# Patient Record
Sex: FEMALE | Race: WHITE | NOT HISPANIC OR LATINO | Employment: UNEMPLOYED | URBAN - METROPOLITAN AREA
[De-identification: names, ages, dates, MRNs, and addresses within clinical notes are randomized per-mention and may not be internally consistent; named-entity substitution may affect disease eponyms.]

---

## 2017-04-16 ENCOUNTER — APPOINTMENT (EMERGENCY)
Dept: CT IMAGING | Facility: HOSPITAL | Age: 32
End: 2017-04-16
Payer: COMMERCIAL

## 2017-04-16 ENCOUNTER — HOSPITAL ENCOUNTER (EMERGENCY)
Facility: HOSPITAL | Age: 32
Discharge: HOME | End: 2017-04-16
Attending: EMERGENCY MEDICINE | Admitting: EMERGENCY MEDICINE
Payer: COMMERCIAL

## 2017-04-16 VITALS
DIASTOLIC BLOOD PRESSURE: 58 MMHG | WEIGHT: 210.54 LBS | SYSTOLIC BLOOD PRESSURE: 129 MMHG | OXYGEN SATURATION: 98 % | TEMPERATURE: 98.5 F | RESPIRATION RATE: 18 BRPM | HEART RATE: 78 BPM

## 2017-04-16 DIAGNOSIS — K52.9 ACUTE GASTROENTERITIS: Primary | ICD-10-CM

## 2017-04-16 LAB
ANION GAP SERPL CALCULATED.3IONS-SCNC: 11 MMOL/L (ref 4–13)
BACTERIA UR QL AUTO: ABNORMAL /HPF
BASOPHILS # BLD AUTO: 0.03 THOUSANDS/ΜL (ref 0–0.1)
BASOPHILS NFR BLD AUTO: 0 % (ref 0–1)
BILIRUB UR QL STRIP: ABNORMAL
BUN SERPL-MCNC: 8 MG/DL (ref 5–25)
CALCIUM SERPL-MCNC: 8.6 MG/DL (ref 8.3–10.1)
CHLORIDE SERPL-SCNC: 106 MMOL/L (ref 100–108)
CLARITY UR: ABNORMAL
CLARITY, POC: NORMAL
CO2 SERPL-SCNC: 25 MMOL/L (ref 21–32)
COLOR UR: ABNORMAL
COLOR, POC: NORMAL
CREAT SERPL-MCNC: 0.83 MG/DL (ref 0.6–1.3)
EOSINOPHIL # BLD AUTO: 0 THOUSAND/ΜL (ref 0–0.61)
EOSINOPHIL NFR BLD AUTO: 0 % (ref 0–6)
ERYTHROCYTE [DISTWIDTH] IN BLOOD BY AUTOMATED COUNT: 14.3 % (ref 11.6–15.1)
GFR SERPL CREATININE-BSD FRML MDRD: >60 ML/MIN/1.73SQ M
GLUCOSE SERPL-MCNC: 79 MG/DL (ref 65–140)
GLUCOSE UR STRIP-MCNC: NEGATIVE MG/DL
HCG UR QL: NEGATIVE
HCT VFR BLD AUTO: 37.9 % (ref 34.8–46.1)
HGB BLD-MCNC: 12.9 G/DL (ref 11.5–15.4)
HGB UR QL STRIP.AUTO: ABNORMAL
KETONES UR STRIP-MCNC: ABNORMAL MG/DL
LEUKOCYTE ESTERASE UR QL STRIP: ABNORMAL
LYMPHOCYTES # BLD AUTO: 2.26 THOUSANDS/ΜL (ref 0.6–4.47)
LYMPHOCYTES NFR BLD AUTO: 13 % (ref 14–44)
MCH RBC QN AUTO: 31.9 PG (ref 26.8–34.3)
MCHC RBC AUTO-ENTMCNC: 34 G/DL (ref 31.4–37.4)
MCV RBC AUTO: 94 FL (ref 82–98)
MONOCYTES # BLD AUTO: 1.06 THOUSAND/ΜL (ref 0.17–1.22)
MONOCYTES NFR BLD AUTO: 6 % (ref 4–12)
NEUTROPHILS # BLD AUTO: 13.71 THOUSANDS/ΜL (ref 1.85–7.62)
NEUTS SEG NFR BLD AUTO: 81 % (ref 43–75)
NITRITE UR QL STRIP: NEGATIVE
NON-SQ EPI CELLS URNS QL MICRO: ABNORMAL /HPF
NRBC BLD AUTO-RTO: 0 /100 WBCS
PH UR STRIP.AUTO: 5.5 [PH] (ref 4.5–8)
PLATELET # BLD AUTO: 233 THOUSANDS/UL (ref 149–390)
PMV BLD AUTO: 11.3 FL (ref 8.9–12.7)
POTASSIUM SERPL-SCNC: 3.6 MMOL/L (ref 3.5–5.3)
PROT UR STRIP-MCNC: ABNORMAL MG/DL
RBC # BLD AUTO: 4.04 MILLION/UL (ref 3.81–5.12)
RBC #/AREA URNS AUTO: ABNORMAL /HPF
SODIUM SERPL-SCNC: 142 MMOL/L (ref 136–145)
SP GR UR STRIP.AUTO: >=1.03 (ref 1–1.03)
UROBILINOGEN UR QL STRIP.AUTO: 1 E.U./DL
WBC # BLD AUTO: 17.06 THOUSAND/UL (ref 4.31–10.16)
WBC #/AREA URNS AUTO: ABNORMAL /HPF

## 2017-04-16 PROCEDURE — 36415 COLL VENOUS BLD VENIPUNCTURE: CPT | Performed by: EMERGENCY MEDICINE

## 2017-04-16 PROCEDURE — 85025 COMPLETE CBC W/AUTO DIFF WBC: CPT | Performed by: EMERGENCY MEDICINE

## 2017-04-16 PROCEDURE — 81002 URINALYSIS NONAUTO W/O SCOPE: CPT | Performed by: EMERGENCY MEDICINE

## 2017-04-16 PROCEDURE — 96374 THER/PROPH/DIAG INJ IV PUSH: CPT

## 2017-04-16 PROCEDURE — 80048 BASIC METABOLIC PNL TOTAL CA: CPT | Performed by: EMERGENCY MEDICINE

## 2017-04-16 PROCEDURE — 99284 EMERGENCY DEPT VISIT MOD MDM: CPT

## 2017-04-16 PROCEDURE — 81001 URINALYSIS AUTO W/SCOPE: CPT

## 2017-04-16 PROCEDURE — 96375 TX/PRO/DX INJ NEW DRUG ADDON: CPT

## 2017-04-16 PROCEDURE — 81025 URINE PREGNANCY TEST: CPT | Performed by: EMERGENCY MEDICINE

## 2017-04-16 PROCEDURE — 74177 CT ABD & PELVIS W/CONTRAST: CPT

## 2017-04-16 PROCEDURE — 87086 URINE CULTURE/COLONY COUNT: CPT

## 2017-04-16 PROCEDURE — 96361 HYDRATE IV INFUSION ADD-ON: CPT

## 2017-04-16 RX ORDER — ONDANSETRON 4 MG/1
4 TABLET, ORALLY DISINTEGRATING ORAL EVERY 8 HOURS PRN
Qty: 10 TABLET | Refills: 0 | Status: SHIPPED | OUTPATIENT
Start: 2017-04-16 | End: 2019-05-10 | Stop reason: ALTCHOICE

## 2017-04-16 RX ORDER — PROPRANOLOL HYDROCHLORIDE 10 MG/1
10 TABLET ORAL 3 TIMES DAILY PRN
COMMUNITY

## 2017-04-16 RX ORDER — PRAZOSIN HYDROCHLORIDE 5 MG/1
10 CAPSULE ORAL
COMMUNITY
End: 2019-05-29

## 2017-04-16 RX ORDER — ONDANSETRON 2 MG/ML
4 INJECTION INTRAMUSCULAR; INTRAVENOUS ONCE
Status: COMPLETED | OUTPATIENT
Start: 2017-04-16 | End: 2017-04-16

## 2017-04-16 RX ORDER — DICYCLOMINE HCL 20 MG
20 TABLET ORAL EVERY 6 HOURS PRN
Qty: 10 TABLET | Refills: 0 | Status: SHIPPED | OUTPATIENT
Start: 2017-04-16 | End: 2018-01-26

## 2017-04-16 RX ORDER — MORPHINE SULFATE 4 MG/ML
4 INJECTION, SOLUTION INTRAMUSCULAR; INTRAVENOUS ONCE
Status: COMPLETED | OUTPATIENT
Start: 2017-04-16 | End: 2017-04-16

## 2017-04-16 RX ADMIN — SODIUM CHLORIDE 1000 ML: 0.9 INJECTION, SOLUTION INTRAVENOUS at 17:12

## 2017-04-16 RX ADMIN — IOHEXOL 100 ML: 350 INJECTION, SOLUTION INTRAVENOUS at 17:41

## 2017-04-16 RX ADMIN — ONDANSETRON 4 MG: 2 INJECTION INTRAMUSCULAR; INTRAVENOUS at 17:30

## 2017-04-16 RX ADMIN — MORPHINE SULFATE 4 MG: 4 INJECTION, SOLUTION INTRAMUSCULAR; INTRAVENOUS at 17:53

## 2017-04-17 LAB — BACTERIA UR CULT: NORMAL

## 2017-04-24 ENCOUNTER — HOSPITAL ENCOUNTER (EMERGENCY)
Facility: HOSPITAL | Age: 32
Discharge: HOME | End: 2017-04-25
Attending: EMERGENCY MEDICINE | Admitting: EMERGENCY MEDICINE
Payer: COMMERCIAL

## 2017-04-24 ENCOUNTER — APPOINTMENT (EMERGENCY)
Dept: RADIOLOGY | Facility: HOSPITAL | Age: 32
End: 2017-04-24
Payer: COMMERCIAL

## 2017-04-24 VITALS
WEIGHT: 215 LBS | SYSTOLIC BLOOD PRESSURE: 134 MMHG | OXYGEN SATURATION: 100 % | RESPIRATION RATE: 16 BRPM | TEMPERATURE: 98.3 F | HEART RATE: 89 BPM | DIASTOLIC BLOOD PRESSURE: 79 MMHG

## 2017-04-24 DIAGNOSIS — S93.402A LEFT ANKLE SPRAIN: Primary | ICD-10-CM

## 2017-04-24 PROCEDURE — 73610 X-RAY EXAM OF ANKLE: CPT

## 2017-04-24 RX ORDER — ACETAMINOPHEN 325 MG/1
650 TABLET ORAL ONCE
Status: COMPLETED | OUTPATIENT
Start: 2017-04-24 | End: 2017-04-24

## 2017-04-24 RX ADMIN — ACETAMINOPHEN 650 MG: 325 TABLET ORAL at 23:03

## 2017-04-25 PROCEDURE — 99283 EMERGENCY DEPT VISIT LOW MDM: CPT

## 2017-11-07 ENCOUNTER — ALLSCRIPTS OFFICE VISIT (OUTPATIENT)
Dept: OTHER | Facility: OTHER | Age: 32
End: 2017-11-07

## 2017-11-07 DIAGNOSIS — S92.355A CLOSED NONDISPLACED FRACTURE OF FIFTH LEFT METATARSAL BONE: ICD-10-CM

## 2017-11-09 NOTE — PROGRESS NOTES
Assessment    1  Closed nondisplaced fracture of fifth metatarsal bone of left foot, initial encounter (983 25) (S92 355A)   2  Depression screening (V79 0) (Z13 89)    Plan  Closed nondisplaced fracture of fifth metatarsal bone of left foot, initial encounter    · Acetaminophen 500 MG Oral Tablet; TAKE 2 TABLET 3 times daily   · Ibuprofen 800 MG Oral Tablet; take 1 tablet every 8 hours prn pain   · * XR FOOT 3+ VIEW LEFT; Status:Active; Requested for:07Nov2017;    · *1 - [facility]  *  Status: Active  Requested RFE:[id number]  Care Summary provided  : Yes  Depression screening    · *VB-Depression Screening; Status:Complete;   Done: 76VJX9820 03:33PM    Discussion/Summary  Discussion Summary:   Discussed with patient that we could sign a medical release form to have x-ray sent over from [facility] however this may take a couple days  Will order new x-ray to verify findings  Also gave referral to Ortho for further evaluation and treatment  Gave refills for ibuprofen and also Tylenol be taken in between the ibuprofen  by Gali STEWARTS  Medication SE Review and Pt Understands Tx: Possible side effects of new medications were reviewed with the patient/guardian today  The treatment plan was reviewed with the patient/guardian  The patient/guardian understands and agrees with the treatment plan   Self Referrals:   Self Referrals: No      Chief Complaint  Chief Complaint Free Text Note Form: Initial visit  States injured left foot, seen at [facility] ER 4 days ago, told sprain left ankle, fx left 5th metatarsal, presents wearing ace wrap, podiatry shoe and using crutches  Advised follow up  History of Present Illness  HPI: y/o female presenting for f/u after being seen in [address] [place] [address] ED for ankle injury  Early Saturday morning was at a party dancing [employer], was crossing legs; L ankle was inverted and fell with whole body weight on top of foot   Was driven to [facility] on [address] and had xrays of ankle  Pt states she has popping of feet and they did not do xrays of feet  Has been using Ibuprofen 800mg q4 hours , using ice and warm compresses, with foot elevation  Using crutches ok  Sometimes has aching pain radiating to upper L leg   tinglig and paresthesias on medial side of L foot last night  Was told fx of 5th metatarsal       Review of Systems  Complete-Female:  Constitutional: no fever-- and-- no chills  Cardiovascular: No complaints of slow heart rate, no fast heart rate, no chest pain, no palpitations, no leg claudication, no lower extremity edema  Respiratory: No complaints of shortness of breath, no wheezing, no cough, no SOB on exertion, no orthopnea, no PND  Musculoskeletal: as noted in HPI  Integumentary: No complaints of skin rash or lesions, no itching, no skin wounds, no breast pain or lump  Neurological: numbness-- and-- tingling  ROS Reviewed:   ROS reviewed  Active Problems    1  Abnormal Papanicolaou smear of vagina with 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) (795 19,079 4)   2  Encounter for contraceptive surveillance (V25 40) (Z30 40)   3  Encounter for counseling regarding initiation of other contraceptive measure (V25 02) (Z30 09)   4  Obesity (278 00) (E66 9)   5  Sleep disorder (780 50) (G47 9)    Past Medical History  1  History of candidal vulvovaginitis (V13 29) (Z86 19)    Surgical History  1  History of Ankle Surgery   2  History of Unlisted Craniofacial And Maxillofacial Procedure    Family History  Mother    1  Family history of   Father    2  Family history of     Social History     · Current some day smoker (305 1) (F17 200)  Social History Reviewed: The social history was reviewed and updated today  The social history was reviewed and is unchanged  Current Meds   1  ClonazePAM 1 MG Oral Tablet; Therapy: 48EVR5445 to (Last Liyah Settle)  Requested for: 24FLY8666 Ordered   2  Prazosin HCl - 2 MG Oral Capsule;  Therapy: 62PEC1733 to Recorded   3  Propranolol HCl - 20 MG Oral Tablet; Therapy: 39EQH2496 to Recorded   4  Sertraline HCl - 100 MG Oral Tablet; take 2 tablet daily; Therapy: (Sekou Kang) to Recorded   5  Sprintec 28 0 25-35 MG-MCG Oral Tablet; TAKE 1 TABLET DAILY AS DIRECTED; Therapy: 82EHD4746 to (Evaluate:37Oko1770)  Requested for: 69WHS3601; Last Rx:91Yvt8904 Ordered   6  Ziprasidone HCl - 40 MG Oral Capsule; Therapy: 22XCY1381 to Recorded    Allergies  1  Codeine Sulfate TABS    Vitals  Vital Signs    Recorded: 81WQT0351 03:21PM   Temperature 98 7 F, Tympanic   Heart Rate 92   Respiration 18   Systolic 293   Diastolic 78   Height 5 ft 3 in   Weight 216 lb    BMI Calculated 38 26   BSA Calculated 2   O2 Saturation 99   LMP 22-Oct-2017   Pain Scale 7       Physical Exam   Constitutional  General appearance: No acute distress, well appearing and well nourished  Pulmonary  Respiratory effort: No increased work of breathing or signs of respiratory distress  Auscultation of lungs: Clear to auscultation  Cardiovascular  Auscultation of heart: Normal rate and rhythm, normal S1 and S2, without murmurs  Examination of extremities for edema and/or varicosities: Normal    Musculoskeletal  Gait and station: Abnormal   Gait evaluation demonstrated antalgia on the left  -- Uses crutches  Inspection/palpation of joints, bones, and muscles: Abnormal  -- Swelling noted in left foot and ankle  Tenderness to palpation along lateral aspect of left ankle  Tenderness also along lateral aspect of left foot  Results/Data  *VB-Depression Screening 79YID4034 03:33PM Amanda Bazan     Test Name Result Flag Reference   Depression Scale Result      Depression Screen - Negative For Symptoms     PHQ-2 Adult Depression Screening 69NCF9879 03:32PM User, s     Test Name Result Flag Reference   PHQ-2 Adult Depression Score 0       Over the last two weeks, how often have you been bothered by any of the following problems?  Little interest or pleasure in doing things: Not at all - 0 Feeling down, depressed, or hopeless: Not at all - 0   PHQ-2 Adult Depression Screening Negative           Signatures   Electronically signed by : AMA Liz; Nov 7 2017  4:35PM EST                       (Author)    Electronically signed by : MARGARET Rodríguez ; Nov 8 2017  7:55AM EST

## 2018-01-10 NOTE — RESULT NOTES
Verified Results  *VB-Depression Screening 76OJL3448 03:33PM Aide Saucedo     Test Name Result Flag Reference   Depression Scale Result      Depression Screen - Negative For Symptoms       Plan  Depression screening    · *VB-Depression Screening; Status:Complete;   Done: 21ORN5156 03:33PM

## 2018-01-14 VITALS
BODY MASS INDEX: 38.27 KG/M2 | HEIGHT: 63 IN | OXYGEN SATURATION: 99 % | RESPIRATION RATE: 18 BRPM | SYSTOLIC BLOOD PRESSURE: 110 MMHG | WEIGHT: 216 LBS | TEMPERATURE: 98.7 F | DIASTOLIC BLOOD PRESSURE: 78 MMHG | HEART RATE: 92 BPM

## 2018-01-16 NOTE — PROCEDURES
Assessment    1  Abnormal Papanicolaou smear of vagina with positive test for vaginal human   papillomavirus (HPV) (795 19,079 4) (R89 6,B97 7)      29yo F with hx of ASCUS pap and HPV16+  Colpo performed today  Patient tolerated well  Impression: SHREYA I-II     Plan  Abnormal Papanicolaou smear of vagina with positive test for vaginal human  papillomavirus (HPV)    · (1) TISSUE EXAM; Status:Active; Requested YZW:01HYC7774;    Perform:[facility]; AQI:16ZTQ6459;LDPQWAI; For:Abnormal Papanicolaou smear of vagina with 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); Ordered By:Sarah Hampton;  Impression? : SHREYA I-II  Sources(s) : Cervix  PMH: Encounter for routine gynecological examination with Papanicolaou smear of cervix    · Urine HCG- POC; Status:Complete;   Done: 63LOP0725 12:55PM   Performed: In Office; DFM:86FEL4266; Last Updated By:Frieda Hampton; 1/27/2016 1:30:23 PM;Ordered; Today; [address]: Encounter for routine gynecological examination with Papanicolaou smear of cervix; Ordered By:Mahi De Paz;  Unlinked    · Latuda 80 MG Oral Tablet   Dispense: 30 Days ; #:30 TABS; Refill: 0; FABIANA = N; Record; Last Updated By: Sukhdev Fonseca; 1/27/2016 12:51:56 PM     RTC for results in 2 weeks  Active Problems    1  Encounter for counseling regarding initiation of other contraceptive measure (V25 02)   (Z30 9)   2  Obesity (278 00) (E66 9)   3  Sleep disorder (780 50) (G47 9)    Current Meds    1  Sprintec 28 0 25-35 MG-MCG Oral Tablet; TAKE 1 TABLET DAILY AS DIRECTED; Therapy: 19NRH7246 to (Evaluate:76Myu1816)  Requested for: 87WYX2461; Last   Rx:03Nov2015 Ordered    2  ClonazePAM 1 MG Oral Tablet; Therapy: 86PPU2469 to (Last Roise Tony)  Requested for: 62PYR0663 Ordered   3  Latuda 80 MG Oral Tablet; Therapy: 30NMG8799 to Recorded   4  Zoloft TABS; Therapy: (Recorded:27Jan2016) to Recorded    Allergies    1   Codeine Sulfate TABS    Physical Exam    Constitutional   General appearance: Abnormal   well nourished, appears older than stated age and Poor dentition  Genitourinary   External genitalia: Normal and no lesions appreciated  Vagina: Normal, no lesions or dryness appreciated  Urethra: Normal     Urethral meatus: Normal   see colposcopy notes  Psychiatric   Orientation to person, place, and time: Normal     Mood and affect: Normal        Procedure    Procedure: colposcopy  Indication: atypical squamous cells of undetermined significance and PCR positive for high risk HPV  HPV 16 +   Risks, benefits and alternatives were discussed with the patient  We discussed possible complications, including infection, bleeding and allergic reaction  Written consent was obtained prior to the procedure  Procedure Note:   A cervical Pap smear was not performed  The squamocolumnar junction was fully visualized  After bathing the cervix in acetic acid, evaluation showed acetowhite changes at 12,4 o'clock and mosaicism at 12 o'clock, but no punctation and no atypical vessels  Vaginal Vault: no abnormalities seen  Vulva: no abnormalities seen  Cervical Biopsy: 2 biopsies taken of the cervix  the biopsies were taken at 12,4 o'clock  Hemostasis was obtained with Monsel's solution, silver nitrate and direct pressure  Patient Status: the patient tolerated the procedure well  Complications: there were no complications  Yellow: Biopsy sites  Red: Acetowhite  Clinical impression SHREYA I         Attending Note  Attending Note [facility]: Attending Note: I interviewed, took the history and examined the patient, I discussed the case with the Resident and reviewed the Resident's note, I supervised the Resident, I supervised the procedure performed by the Resident and I agree with the Resident management plan as it was presented to me  Level of Participation: I was present in clinic and examined the patient  I agree with the Resident's note        Future Appointments    Date/Time Provider Specialty Site   02/03/2016 01:00 PM [facility] [place], CRNP Schedule  [facility] [place]   02/10/2016 01:30 PM [facility], Physician Schedule  [facility]     Signatures   Electronically signed by : MARGARET Westfall ; Jan 27 2016  2:10PM EST                       (Author)    Electronically signed by :  Yessi John, ; Jan 27 2016  4:18PM EST                       (Co-author)

## 2018-01-16 NOTE — PROGRESS NOTES
Assessment    1  Encounter for contraceptive surveillance () (Z30 40)    Plan  Encounter for counseling regarding initiation of other contraceptive measure    · Sprintec 28 0 25-35 MG-MCG Oral Tablet; TAKE 1 TABLET DAILY AS DIRECTED   Rx By: Sharlene Hutchins; Dispense: 28 Days ; #:1 Tablet; Refill: 10; For: Encounter for counseling regarding initiation of other contraceptive measure; FABIANA = N; Sent To: [place] [phone]    Discussion/Summary  Discussion Summary:   Safe and effective use of OCP's reinforced  Pt verbalized understanding of all discussed  Chief Complaint  Chief Complaint Free Text Note Form: 3 month pill check  History of Present Illness  HPI: Happy taking OCP's  States regular periods, less discomfort  Wants to continue OCP's  Had a colpo has an appointment on 2/10/2016 for results      Active Problems    1  Abnormal Papanicolaou smear of vagina with positive test for vaginal human   papillomavirus (HPV) (795 19,079 4) (R89 6,B97 7)   2  Encounter for counseling regarding initiation of other contraceptive measure (V2 02)   (Z30 9)   3  Obesity (278 00) (E66 9)   4  Sleep disorder (780 50) (G47 9)    Past Medical History    1  History of candidal vulvovaginitis (V13 29) (Z86 19)    Family History    1  Family history of     2  Family history of     Social History    · Current some day smoker (305 1) (F17 210)    Current Meds   1  ClonazePAM 1 MG Oral Tablet; Therapy: 53BTY1944 to (Last Fran Malady)  Requested for: 74ORA1179 Ordered   2  Sprintec 28 0 25-35 MG-MCG Oral Tablet; TAKE 1 TABLET DAILY AS DIRECTED; Therapy: 58CMW2511 to (Evaluate:67Whu6895)  Requested for: 08KBS7916; Last   Rx:2015 Ordered   3  Zoloft TABS; Therapy: (Recorded:2016) to Recorded    Allergies    1   Codeine Sulfate TABS    Vitals  Vital Signs [Data Includes: Current Encounter]    Recorded: 11VHE4427 50:12MT   Systolic 993   Diastolic 74   Weight 887 lb    BMI Calculated 31 53   BSA Calculated 1 84     Future Appointments    Date/Time Provider Specialty Site   02/10/2016 01:30 PM [address] [address], Physician Schedule  [facility]     Signatures   Electronically signed by :  GISSELLE Bartlett; Feb  3 2016  1:35PM EST                       (Author)    Electronically signed by : Linda Martini MD; Feb 4 2016  8:13AM EST

## 2018-01-26 ENCOUNTER — HOSPITAL ENCOUNTER (EMERGENCY)
Facility: HOSPITAL | Age: 33
Discharge: HOME | End: 2018-01-26
Payer: COMMERCIAL

## 2018-01-26 VITALS
DIASTOLIC BLOOD PRESSURE: 75 MMHG | SYSTOLIC BLOOD PRESSURE: 137 MMHG | OXYGEN SATURATION: 98 % | BODY MASS INDEX: 38.16 KG/M2 | WEIGHT: 215.4 LBS | RESPIRATION RATE: 20 BRPM | TEMPERATURE: 98.3 F | HEART RATE: 86 BPM

## 2018-01-26 DIAGNOSIS — B34.9 VIRAL SYNDROME: Primary | ICD-10-CM

## 2018-01-26 LAB
FLUAV AG SPEC QL: ABNORMAL
FLUBV AG SPEC QL: DETECTED
RSV B RNA SPEC QL NAA+PROBE: ABNORMAL

## 2018-01-26 PROCEDURE — 99283 EMERGENCY DEPT VISIT LOW MDM: CPT

## 2018-01-26 PROCEDURE — 87798 DETECT AGENT NOS DNA AMP: CPT | Performed by: PHYSICIAN ASSISTANT

## 2018-01-26 RX ORDER — ALBUTEROL SULFATE 90 UG/1
2 AEROSOL, METERED RESPIRATORY (INHALATION) EVERY 6 HOURS PRN
Qty: 1 INHALER | Refills: 0 | Status: SHIPPED | OUTPATIENT
Start: 2018-01-26 | End: 2019-05-10 | Stop reason: ALTCHOICE

## 2018-01-26 RX ORDER — ZIPRASIDONE HYDROCHLORIDE 20 MG/1
20 CAPSULE ORAL DAILY
COMMUNITY
End: 2018-01-31 | Stop reason: DRUGHIGH

## 2018-01-26 NOTE — ED PROVIDER NOTES
History  Chief Complaint   Patient presents with    Flu Symptoms     fatigue, nuasea, wheezing, cough, congestion  60-year-old female presents for evaluation of flu-like symptoms x3 days  She states her symptoms started as cold-like symptoms but have worsened  He describes nasal congestion, rhinorrhea, sore throat, productive cough, wheezing, body aches  No fevers, headache, chest pain, shortness of breath, abdominal pain, vomiting, diarrhea or urinary symptoms  She is taking Tylenol cold and cough with some relief of symptoms  Patient states she is smoker but has not smoked anything today due to her symptoms  No sick contacts  She is tolerating fluids  She has no other complaints at this time  No history of asthma or lung disease  She has an appointment with her family doctor in 4 days  Prior to Admission Medications   Prescriptions Last Dose Informant Patient Reported? Taking? clonazePAM (KlonoPIN) 2 mg tablet   Yes Yes   Sig: Take 2 mg by mouth 3 (three) times a day as needed for seizures  ondansetron (ZOFRAN-ODT) 4 mg disintegrating tablet   No Yes   Sig: Take 1 tablet by mouth every 8 (eight) hours as needed for nausea or vomiting   prazosin (MINIPRESS) 5 mg capsule   Yes Yes   Sig: Take 10 mg by mouth daily at bedtime     propranolol (INDERAL) 10 mg tablet   Yes Yes   Sig: Take 10 mg by mouth 3 (three) times a day as needed     sertraline (ZOLOFT) 100 mg tablet   Yes Yes   Sig: Take 100 mg by mouth 2 (two) times a day     ziprasidone (GEODON) 20 mg capsule   Yes Yes   Sig: Take 20 mg by mouth daily      Facility-Administered Medications: None       Past Medical History:   Diagnosis Date    Anxiety     Bipolar 1 disorder ([facility] [address] )     Depression     IBS (irritable bowel syndrome)     Insomnia     Migraine     PTSD (post-traumatic stress disorder)        Past Surgical History:   Procedure Laterality Date    ANKLE HARDWARE REMOVAL      CHOLECYSTECTOMY      MOUTH SURGERY  2011 metal placed from broken jaw       History reviewed  No pertinent family history  I have reviewed and agree with the history as documented  Social History   Substance Use Topics    Smoking status: Current Every Day Smoker     Packs/day: 0 50    Smokeless tobacco: Never Used    Alcohol use Yes      Comment: ocassional        Review of Systems   Constitutional: Negative for chills and fever  HENT: Positive for congestion, postnasal drip, rhinorrhea, sinus pressure and sore throat  Negative for ear discharge, ear pain, trouble swallowing and voice change  Respiratory: Positive for cough and wheezing  Negative for chest tightness and shortness of breath  Cardiovascular: Negative for chest pain and palpitations  Gastrointestinal: Negative for abdominal pain, diarrhea, nausea and vomiting  Genitourinary: Negative for dysuria, frequency, hematuria and urgency  Musculoskeletal: Positive for myalgias  Negative for back pain  Skin: Negative for rash  Neurological: Negative for dizziness, weakness, light-headedness, numbness and headaches  Physical Exam  ED Triage Vitals [01/26/18 1049]   Temperature Pulse Respirations Blood Pressure SpO2   98 3 °F (36 8 °C) 86 20 137/75 98 %      Temp src Heart Rate Source Patient Position - Orthostatic VS BP Location FiO2 (%)   -- -- -- -- --      Pain Score       6           Orthostatic Vital Signs  Vitals:    01/26/18 1049   BP: 137/75   Pulse: 86       Physical Exam   Constitutional: She is oriented to person, place, and time  Vital signs are normal  She appears well-developed and well-nourished  Non-toxic appearance  She does not have a sickly appearance  She does not appear ill  No distress  Non-toxic appearing  She speaks in full sentences without difficulty  HENT:   Head: Normocephalic and atraumatic     Right Ear: Hearing, tympanic membrane, external ear and ear canal normal    Left Ear: Hearing, tympanic membrane, external ear and ear canal normal    Nose: Mucosal edema present  No rhinorrhea  Mouth/Throat: Uvula is midline, oropharynx is clear and moist and mucous membranes are normal  No posterior oropharyngeal erythema  Tonsils are 1+ on the right  Tonsils are 1+ on the left  No tonsillar exudate  Eyes: Conjunctivae, EOM and lids are normal  Pupils are equal, round, and reactive to light  Neck: Normal range of motion  Neck supple  Cardiovascular: Normal rate, regular rhythm and normal heart sounds  Exam reveals no gallop and no friction rub  No murmur heard  Pulmonary/Chest: Effort normal and breath sounds normal  No respiratory distress  She has no decreased breath sounds  She has no wheezes  She has no rhonchi  She has no rales  Lungs CTA bilaterally   Abdominal: Soft  There is no tenderness  Musculoskeletal: Normal range of motion  Neurological: She is alert and oriented to person, place, and time  Gait normal  GCS eye subscore is 4  GCS verbal subscore is 5  GCS motor subscore is 6  Skin: Skin is warm and dry  Capillary refill takes less than 2 seconds  She is not diaphoretic  Psychiatric: She has a normal mood and affect  Nursing note and vitals reviewed  ED Medications  Medications - No data to display    Diagnostic Studies  Results Reviewed     Procedure Component Value Units Date/Time    Influenza A/B and RSV by PCR (indicated for patients >2 mo of age) [[id number]] Collected:  01/26/18 1217    Lab Status:  No result Specimen:  Nasopharyngeal from Nasopharyngeal Swab                  No orders to display              Procedures  Procedures       Phone Contacts  ED Phone Contact    ED Course  ED Course                                MDM  Number of Diagnoses or Management Options  Viral syndrome: new and requires workup  Diagnosis management comments:   75-year-old female who presents for evaluation of flu-like symptoms for the past 3 days    She has been afebrile, her vitals are normal, lungs are clear to auscultation she is overall nontoxic in appearance  I encouraged supportive care for viral versus flu-like illness that includes rest, fluids, Motrin or Tylenol for fevers and over-the-counter cold and cough medications, inhaler for wheezing  Flu swab was obtained  She has a follow-up appointment with her family doctor  on Monday  Return to ED precautions were given  Patient verbalizes understanding and agrees with plan  Amount and/or Complexity of Data Reviewed  Clinical lab tests: ordered and reviewed    Patient Progress  Patient progress: stable    CritCare Time    Disposition  Final diagnoses:   Viral syndrome     Time reflects when diagnosis was documented in both MDM as applicable and the Disposition within this note     Time User Action Codes Description Comment    1/26/2018 12:09 PM Phoenix Trejo Add [B34 9] Viral syndrome       ED Disposition     ED Disposition Condition Comment    Discharge  Vivian A Day discharge to home/self care  Condition at discharge: Good        Follow-up Information     Follow up With Specialties Details Why Contact Info Additional Information    Elvia Cummins PA-C Family Medicine Go to [facility]  [address] [place] [address],[address] [place] [address] [address] [place] [address] Emergency Department Emergency Medicine  If symptoms worsen [facility]  Timmy Calderon [address] [facility] ED, [address] [place] [address], [facility]        Patient's Medications   Discharge Prescriptions    ALBUTEROL (PROVENTIL HFA,VENTOLIN HFA) 90 MCG/ACT INHALER    Inhale 2 puffs every 6 (six) hours as needed for wheezing or shortness of breath       Start Date: 1/26/2018 End Date: --       Order Dose: 2 puffs       Quantity: 1 Inhaler    Refills: 0     No discharge procedures on file      ED Provider  Electronically Signed by           Holli Gold PA-C  01/26/18 9078

## 2018-01-26 NOTE — DISCHARGE INSTRUCTIONS
Viral Syndrome   WHAT YOU NEED TO KNOW:   Viral syndrome is a term used for a viral infection that has no clear cause  Viruses are spread easily from person to person through the air and on shared items  DISCHARGE INSTRUCTIONS:   Call 911 for the following:   · You have a seizure  · You cannot be woken  · You have chest pain or trouble breathing  Return to the emergency department if:   · You have a stiff neck, a bad headache, and sensitivity to light  · You feel weak, dizzy, or confused  · You stop urinating or urinate a lot less than normal      · You cough up blood or thick, yellow or green, mucus  · You have severe abdominal pain or your abdomen is larger than usual   Contact your healthcare provider if:   · Your symptoms do not get better with treatment, or get worse, after 3 days  · You have a rash or ear pain  · You have burning when you urinate  · You have questions or concerns about your condition or care  Medicines: You may  need any of the following:  · Acetaminophen  decreases pain and fever  It is available without a doctor's order  Ask how much medicine to take and how often to take it  Follow directions  Acetaminophen can cause liver damage if not taken correctly  · NSAIDs , such as ibuprofen, help decrease swelling, pain, and fever  NSAIDs can cause stomach bleeding or kidney problems in certain people  If you take blood thinner medicine, always ask your healthcare provider if NSAIDs are safe for you  Always read the medicine label and follow directions  · Cold medicine  helps decrease swelling, control a cough, and relieve chest or nasal congestion  · Saline nasal spray  helps decrease nasal congestion  · Take your medicine as directed  Contact your healthcare provider if you think your medicine is not helping or if you have side effects  Tell him of her if you are allergic to any medicine   Keep a list of the medicines, vitamins, and herbs you take  Include the amounts, and when and why you take them  Bring the list or the pill bottles to follow-up visits  Carry your medicine list with you in case of an emergency  Manage your symptoms:   · Drink liquids as directed  to prevent dehydration  Ask how much liquid to drink each day and which liquids are best for you  Ask if you should drink an oral rehydration solution (ORS)  An ORS has the right amounts of water, salts, and sugar you need to replace body fluids  This may help prevent dehydration caused by vomiting or diarrhea  Do not drink liquids with caffeine  Drinks with caffeine can make dehydration worse  · Get plenty of rest  to help your body heal  Take naps throughout the day  Ask your healthcare provider when you can return to work and your normal activities  · Use a cool mist humidifier  to help you breathe easier if you have nasal or chest congestion  Ask your healthcare provider how to use a cool mist humidifier  · Eat honey or use cough drops  to help decrease throat discomfort  Ask your healthcare provider how much honey you should eat each day  Cough drops are available without a doctor's order  Follow directions for taking cough drops  · Do not smoke and stay away from others who smoke  Nicotine and other chemicals in cigarettes and cigars can cause lung damage  Smoking can also delay healing  Ask your healthcare provider for information if you currently smoke and need help to quit  E-cigarettes or smokeless tobacco still contain nicotine  Talk to your healthcare provider before you use these products  · Wash your hands frequently  to prevent the spread of germs to others  Use soap and water  Use gel hand  when soap and water are not available  Wash your hands after you use the bathroom, cough, or sneeze  Wash your hands before you prepare or eat food    Follow up with your healthcare provider as directed:  Write down your questions so you remember to ask them during your visits  © 2017 [address] Information is for End User's use only and may not be sold, redistributed or otherwise used for commercial purposes  All illustrations and images included in CareNotes® are the copyrighted property of [employer] M [employer]  or [employer]  The above information is an  only  It is not intended as medical advice for individual conditions or treatments  Talk to your doctor, nurse or pharmacist before following any medical regimen to see if it is safe and effective for you  Influenza   WHAT YOU NEED TO KNOW:   Influenza (the flu) is an infection caused by the influenza virus  The flu is easily spread when an infected person coughs, sneezes, or has close contact with others  You may be able to spread the flu to others for 1 week or longer after signs or symptoms appear  DISCHARGE INSTRUCTIONS:   Call 911 for any of the following:   · You have trouble breathing, and your lips look purple or blue  · You have a seizure  Return to the emergency department if:   · You are dizzy, or you are urinating less or not at all  · You have a headache with a stiff neck, and you feel tired or confused  · You have new pain or pressure in your chest     · Your symptoms, such as shortness of breath, vomiting, or diarrhea, get worse  · Your symptoms, such as fever and coughing, seem to get better, but then get worse  Contact your healthcare provider if:   · You have new muscle pain or weakness  · You have questions or concerns about your condition or care  Medicines: You may need any of the following:  · Acetaminophen  decreases pain and fever  It is available without a doctor's order  Ask how much to take and how often to take it  Follow directions  Acetaminophen can cause liver damage if not taken correctly  · NSAIDs , such as ibuprofen, help decrease swelling, pain, and fever   This medicine is available with or without a doctor's order  NSAIDs can cause stomach bleeding or kidney problems in certain people  If you take blood thinner medicine, always ask your healthcare provider if NSAIDs are safe for you  Always read the medicine label and follow directions  · Antivirals  help fight a viral infection  · Take your medicine as directed  Contact your healthcare provider if you think your medicine is not helping or if you have side effects  Tell him or her if you are allergic to any medicine  Keep a list of the medicines, vitamins, and herbs you take  Include the amounts, and when and why you take them  Bring the list or the pill bottles to follow-up visits  Carry your medicine list with you in case of an emergency  Rest  as much as you can to help you recover  Drink liquids as directed  to help prevent dehydration  Ask how much liquid to drink each day and which liquids are best for you  Prevent the spread of influenza:   · Wash your hands often  Use soap and water  Wash your hands after you use the bathroom, change a child's diapers, or sneeze  Wash your hands before you prepare or eat food  Use gel hand cleanser when soap and water are not available  Do not touch your eyes, nose, or mouth unless you have washed your hands first            · Cover your mouth when you sneeze or cough  Cough into a tissue or the bend of your arm  · Clean shared items with a germ-killing   Clean table surfaces, doorknobs, and light switches  Do not share towels, silverware, and dishes with people who are sick  Wash bed sheets, towels, silverware, and dishes with soap and water  · Wear a mask  over your mouth and nose if you are sick or are near anyone who is sick  · Stay away from others  if you are sick  · Influenza vaccine  helps prevent influenza (flu)  Everyone older than 6 months should get a yearly influenza vaccine  Get the vaccine as soon as it is available, usually in September or October each year    Follow up with your healthcare provider as directed:  Write down your questions so you remember to ask them during your visits  © 2017 [address]  Information is for End User's use only and may not be sold, redistributed or otherwise used for commercial purposes  All illustrations and images included in CareNotes® are the copyrighted property of [employer]  or Donnie Bryant  The above information is an  only  It is not intended as medical advice for individual conditions or treatments  Talk to your doctor, nurse or pharmacist before following any medical regimen to see if it is safe and effective for you  REST, DRINK PLENTY OF FLUIDS      CONTINUE TYLENOL FOR FEVERS OR BODY ACHES

## 2018-01-31 RX ORDER — ZIPRASIDONE HYDROCHLORIDE 40 MG/1
60 CAPSULE ORAL
COMMUNITY
Start: 2017-11-07 | End: 2019-05-10 | Stop reason: ALTCHOICE

## 2018-02-01 ENCOUNTER — OFFICE VISIT (OUTPATIENT)
Dept: FAMILY MEDICINE CLINIC | Facility: CLINIC | Age: 33
End: 2018-02-01
Payer: COMMERCIAL

## 2018-02-01 VITALS
HEART RATE: 129 BPM | DIASTOLIC BLOOD PRESSURE: 82 MMHG | TEMPERATURE: 97.9 F | BODY MASS INDEX: 36.02 KG/M2 | SYSTOLIC BLOOD PRESSURE: 122 MMHG | OXYGEN SATURATION: 98 % | HEIGHT: 64 IN | RESPIRATION RATE: 15 BRPM | WEIGHT: 211 LBS

## 2018-02-01 DIAGNOSIS — J11.1 INFLUENZA: Primary | ICD-10-CM

## 2018-02-01 PROBLEM — F31.9 BIPOLAR 1 DISORDER (HCC): Status: ACTIVE | Noted: 2018-02-01

## 2018-02-01 PROCEDURE — 99213 OFFICE O/P EST LOW 20 MIN: CPT | Performed by: PHYSICIAN ASSISTANT

## 2018-02-01 PROCEDURE — 3008F BODY MASS INDEX DOCD: CPT | Performed by: PHYSICIAN ASSISTANT

## 2018-02-01 RX ORDER — DEXTROMETHORPHAN HYDROBROMIDE AND PROMETHAZINE HYDROCHLORIDE 15; 6.25 MG/5ML; MG/5ML
5 SYRUP ORAL 4 TIMES DAILY PRN
Qty: 140 ML | Refills: 0 | Status: SHIPPED | OUTPATIENT
Start: 2018-02-01 | End: 2018-02-08

## 2018-02-01 NOTE — PROGRESS NOTES
Assessment/Plan:    No problem-specific Assessment & Plan notes found for this encounter  Informed patient that she is most likely on the tail end of her influenza infection  Will send over cough syrup to see if this helps with cough  May last for 1-2 weeks  If the symptoms do not improve make a follow-up appointment  Diagnoses and all orders for this visit:    Influenza  -     promethazine-dextromethorphan (PHENERGAN-DM) 6 25-15 mg/5 mL oral syrup; Take 5 mL by mouth 4 (four) times a day as needed for cough for up to 7 days    Other orders  -     ziprasidone (GEODON) 40 mg capsule; Take by mouth          Subjective:      Patient ID: Amanda Mancilla Day is a 28 y o  female  27-year-old female presenting for follow-up of recent ED visit  Patient was seen for flu-like symptoms  Flu swab did come back positive for influenza A  Patient states that she is feeling better than she did a week ago, but does believe the symptoms are now in her chest   Has chest tightness and congestion, with productive cough  At times has difficulty breathing but is using a rescue inhaler as needed  Has been taking Mucinex and Tylenol which has also been helping with symptoms  Denies any recent fevers or chills  The following portions of the patient's history were reviewed and updated as appropriate:   She  has a past medical history of Anxiety; Bipolar 1 disorder ([address] ); Depression; IBS (irritable bowel syndrome); Insomnia; Migraine; and PTSD (post-traumatic stress disorder)  She  does not have any pertinent problems on file  She  reports that she has been smoking  She has been smoking about 0 50 packs per day  She has never used smokeless tobacco  She reports that she drinks alcohol  She reports that she does not use drugs    Current Outpatient Prescriptions   Medication Sig Dispense Refill    ziprasidone (GEODON) 40 mg capsule Take by mouth      albuterol (PROVENTIL HFA,VENTOLIN HFA) 90 mcg/act inhaler Inhale 2 puffs every 6 (six) hours as needed for wheezing or shortness of breath 1 Inhaler 0    clonazePAM (KlonoPIN) 2 mg tablet Take 2 mg by mouth 3 (three) times a day as needed for seizures   ondansetron (ZOFRAN-ODT) 4 mg disintegrating tablet Take 1 tablet by mouth every 8 (eight) hours as needed for nausea or vomiting 10 tablet 0    prazosin (MINIPRESS) 5 mg capsule Take 10 mg by mouth daily at bedtime        promethazine-dextromethorphan (PHENERGAN-DM) 6 25-15 mg/5 mL oral syrup Take 5 mL by mouth 4 (four) times a day as needed for cough for up to 7 days 140 mL 0    propranolol (INDERAL) 10 mg tablet Take 10 mg by mouth 3 (three) times a day as needed        sertraline (ZOLOFT) 100 mg tablet Take 100 mg by mouth 2 (two) times a day  No current facility-administered medications for this visit  She is allergic to codeine; naproxen; and wellbutrin [bupropion]       Review of Systems   Constitutional: Negative for chills, fatigue and fever  HENT: Positive for rhinorrhea  Negative for sinus pain, sinus pressure, sneezing and sore throat  Respiratory: Positive for cough, chest tightness and shortness of breath  Cardiovascular: Negative for chest pain  Gastrointestinal: Negative for abdominal pain, nausea and vomiting  Objective:  Vitals:    02/01/18 1346   BP: 122/82   BP Location: Left arm   Patient Position: Sitting   Cuff Size: Standard   Pulse: (!) 129   Resp: 15   Temp: 97 9 °F (36 6 °C)   TempSrc: Tympanic   SpO2: 98%   Weight: 95 7 kg (211 lb)   Height: 5' 3 5" (1 613 m)      Physical Exam   Constitutional: She appears well-developed and well-nourished  No distress  HENT:   Right Ear: Hearing, tympanic membrane, external ear and ear canal normal    Left Ear: Hearing, tympanic membrane, external ear and ear canal normal    Nose: Mucosal edema and rhinorrhea present  Right sinus exhibits no maxillary sinus tenderness and no frontal sinus tenderness   Left sinus exhibits no maxillary sinus tenderness and no frontal sinus tenderness  Swelling in left nares greater than right  Cardiovascular: Normal rate, regular rhythm and normal heart sounds  Exam reveals no gallop and no friction rub  No murmur heard  Pulmonary/Chest: Effort normal and breath sounds normal  No respiratory distress  She has no wheezes  Skin: She is not diaphoretic

## 2018-06-22 ENCOUNTER — APPOINTMENT (EMERGENCY)
Dept: NON INVASIVE DIAGNOSTICS | Facility: HOSPITAL | Age: 33
End: 2018-06-22
Payer: COMMERCIAL

## 2018-06-22 ENCOUNTER — HOSPITAL ENCOUNTER (EMERGENCY)
Facility: HOSPITAL | Age: 33
Discharge: HOME | End: 2018-06-22
Admitting: EMERGENCY MEDICINE
Payer: COMMERCIAL

## 2018-06-22 VITALS
WEIGHT: 207 LBS | TEMPERATURE: 98.3 F | SYSTOLIC BLOOD PRESSURE: 145 MMHG | OXYGEN SATURATION: 98 % | BODY MASS INDEX: 36.09 KG/M2 | HEART RATE: 61 BPM | DIASTOLIC BLOOD PRESSURE: 70 MMHG | RESPIRATION RATE: 16 BRPM

## 2018-06-22 DIAGNOSIS — Z86.718 HISTORY OF DVT IN ADULTHOOD: ICD-10-CM

## 2018-06-22 DIAGNOSIS — T14.8XXA BRUISING: Primary | ICD-10-CM

## 2018-06-22 LAB
ALBUMIN SERPL BCP-MCNC: 3.6 G/DL (ref 3.5–5)
ALP SERPL-CCNC: 109 U/L (ref 46–116)
ALT SERPL W P-5'-P-CCNC: 28 U/L (ref 12–78)
ANION GAP SERPL CALCULATED.3IONS-SCNC: 9 MMOL/L (ref 4–13)
APTT PPP: 30 SECONDS (ref 24–36)
AST SERPL W P-5'-P-CCNC: 37 U/L (ref 5–45)
BASOPHILS # BLD AUTO: 0.03 THOUSANDS/ΜL (ref 0–0.1)
BASOPHILS NFR BLD AUTO: 0 % (ref 0–1)
BILIRUB SERPL-MCNC: 0.22 MG/DL (ref 0.2–1)
BUN SERPL-MCNC: 8 MG/DL (ref 5–25)
CALCIUM SERPL-MCNC: 8.6 MG/DL (ref 8.3–10.1)
CHLORIDE SERPL-SCNC: 105 MMOL/L (ref 100–108)
CO2 SERPL-SCNC: 27 MMOL/L (ref 21–32)
CREAT SERPL-MCNC: 0.8 MG/DL (ref 0.6–1.3)
EOSINOPHIL # BLD AUTO: 0.18 THOUSAND/ΜL (ref 0–0.61)
EOSINOPHIL NFR BLD AUTO: 2 % (ref 0–6)
ERYTHROCYTE [DISTWIDTH] IN BLOOD BY AUTOMATED COUNT: 18 % (ref 11.6–15.1)
GFR SERPL CREATININE-BSD FRML MDRD: 98 ML/MIN/1.73SQ M
GLUCOSE SERPL-MCNC: 80 MG/DL (ref 65–140)
HCT VFR BLD AUTO: 36.1 % (ref 34.8–46.1)
HGB BLD-MCNC: 11.5 G/DL (ref 11.5–15.4)
INR PPP: 0.96 (ref 0.86–1.17)
LYMPHOCYTES # BLD AUTO: 3.23 THOUSANDS/ΜL (ref 0.6–4.47)
LYMPHOCYTES NFR BLD AUTO: 39 % (ref 14–44)
MCH RBC QN AUTO: 27.4 PG (ref 26.8–34.3)
MCHC RBC AUTO-ENTMCNC: 31.9 G/DL (ref 31.4–37.4)
MCV RBC AUTO: 86 FL (ref 82–98)
MONOCYTES # BLD AUTO: 0.62 THOUSAND/ΜL (ref 0.17–1.22)
MONOCYTES NFR BLD AUTO: 8 % (ref 4–12)
NEUTROPHILS # BLD AUTO: 4.14 THOUSANDS/ΜL (ref 1.85–7.62)
NEUTS SEG NFR BLD AUTO: 50 % (ref 43–75)
NRBC BLD AUTO-RTO: 0 /100 WBCS
PLATELET # BLD AUTO: 232 THOUSANDS/UL (ref 149–390)
PMV BLD AUTO: 10.3 FL (ref 8.9–12.7)
POTASSIUM SERPL-SCNC: 3.7 MMOL/L (ref 3.5–5.3)
PROT SERPL-MCNC: 7 G/DL (ref 6.4–8.2)
PROTHROMBIN TIME: 12.9 SECONDS (ref 11.8–14.2)
RBC # BLD AUTO: 4.2 MILLION/UL (ref 3.81–5.12)
SODIUM SERPL-SCNC: 141 MMOL/L (ref 136–145)
WBC # BLD AUTO: 8.2 THOUSAND/UL (ref 4.31–10.16)

## 2018-06-22 PROCEDURE — 85610 PROTHROMBIN TIME: CPT | Performed by: PHYSICIAN ASSISTANT

## 2018-06-22 PROCEDURE — 80053 COMPREHEN METABOLIC PANEL: CPT | Performed by: PHYSICIAN ASSISTANT

## 2018-06-22 PROCEDURE — 85730 THROMBOPLASTIN TIME PARTIAL: CPT | Performed by: PHYSICIAN ASSISTANT

## 2018-06-22 PROCEDURE — 93970 EXTREMITY STUDY: CPT

## 2018-06-22 PROCEDURE — 36415 COLL VENOUS BLD VENIPUNCTURE: CPT | Performed by: PHYSICIAN ASSISTANT

## 2018-06-22 PROCEDURE — 85025 COMPLETE CBC W/AUTO DIFF WBC: CPT | Performed by: PHYSICIAN ASSISTANT

## 2018-06-22 PROCEDURE — 99284 EMERGENCY DEPT VISIT MOD MDM: CPT

## 2018-06-23 NOTE — DISCHARGE INSTRUCTIONS
Hematoma   WHAT YOU NEED TO KNOW:   A hematoma is a collection of blood  A bruise is a type of hematoma  A hematoma may form in a muscle or in the tissues just under the skin  A hematoma that forms under the skin will feel like a bump or hard mass  Hematomas can happen anywhere in your body, including in your brain  Your body may break down and absorb a mild hematoma on its own  A more serious hematoma may need treatment  DISCHARGE INSTRUCTIONS:   Medicines: You may need any of the following:  · Prescription pain medicine  may be given  Ask how to take this medicine safely  · NSAIDs , such as ibuprofen, help decrease swelling, pain, and fever  This medicine is available with or without a doctor's order  NSAIDs can cause stomach bleeding or kidney problems in certain people  If you take blood thinner medicine, always ask your healthcare provider if NSAIDs are safe for you  Always read the medicine label and follow directions  · Antibiotics  prevent or treat a bacterial infection  · Take your medicine as directed  Contact your healthcare provider if you think your medicine is not helping or if you have side effects  Tell him of her if you are allergic to any medicine  Keep a list of the medicines, vitamins, and herbs you take  Include the amounts, and when and why you take them  Bring the list or the pill bottles to follow-up visits  Carry your medicine list with you in case of an emergency  Return to the emergency department if:   · You have new or worsening pain, or pain that does not get better with medicine  · You have a fever  · You have trouble moving the body part that has the hematoma  Contact your healthcare provider if:   · You have questions or concerns about your condition or care  Follow up with your healthcare provider as directed: You may need to have surgery if your hematoma is severe   You may also need other tests to make sure there is no other damage that needs to be treated  Write down your questions so you remember to ask them during your visits  Self-care:   · Rest the area  Rest will help your body heal and will also help prevent more damage  · Apply ice as directed  Ice helps reduce swelling  Ice may also help prevent tissue damage  Use an ice pack, or put crushed ice in a bag  Cover it with a towel  Place it on your hematoma for 20 minutes every hour, or as directed  Ask how many times each day to apply ice, and for how many days  · Compress the injury if possible  Lightly wrap the injury with an elastic or soft bandage  This may help control swelling  Ask your healthcare provider how to wrap your injury properly  · Elevate the area as directed  If possible, raise the area above the level of your heart as often as you can  This will help decrease swelling  · Keep the hematoma covered with a bandage  This will help protect the area while it heals  © 2017 [address]  Information is for End User's use only and may not be sold, redistributed or otherwise used for commercial purposes  All illustrations and images included in CareNotes® are the copyrighted property of [employer]  or Donnie Bryant  The above information is an  only  It is not intended as medical advice for individual conditions or treatments  Talk to your doctor, nurse or pharmacist before following any medical regimen to see if it is safe and effective for you

## 2018-06-23 NOTE — ED PROVIDER NOTES
History  Chief Complaint   Patient presents with    Bleeding/Bruising     with bruse on right leg since Wed denies injury has hx of DVT       Rash   Location: Right medial thigh  Quality: bruising    Severity:  Moderate  Onset quality:  Gradual  Duration:  3 days  Timing:  Constant  Progression:  Improving  Chronicity:  New  Context: not animal contact, not exposure to similar rash, not insect bite/sting, not medications, not pregnancy and not sun exposure    Context comment:  Patient denies injury spontaneous bruising right inner thigh  Relieved by:  Nothing  Worsened by:  Nothing  Ineffective treatments:  None tried  Associated symptoms: no abdominal pain, no fever, no induration, no myalgias, no nausea, no periorbital edema, no throat swelling, no tongue swelling, no URI and not vomiting        Prior to Admission Medications   Prescriptions Last Dose Informant Patient Reported? Taking? albuterol (PROVENTIL HFA,VENTOLIN HFA) 90 mcg/act inhaler   No Yes   Sig: Inhale 2 puffs every 6 (six) hours as needed for wheezing or shortness of breath   clonazePAM (KlonoPIN) 2 mg tablet   Yes Yes   Sig: Take 2 mg by mouth 3 (three) times a day as needed for seizures  ondansetron (ZOFRAN-ODT) 4 mg disintegrating tablet   No Yes   Sig: Take 1 tablet by mouth every 8 (eight) hours as needed for nausea or vomiting   prazosin (MINIPRESS) 5 mg capsule   Yes Yes   Sig: Take 10 mg by mouth daily at bedtime     propranolol (INDERAL) 10 mg tablet   Yes Yes   Sig: Take 10 mg by mouth 3 (three) times a day as needed     sertraline (ZOLOFT) 100 mg tablet   Yes Yes   Sig: Take 100 mg by mouth 2 (two) times a day     ziprasidone (GEODON) 40 mg capsule  Self Yes No   Sig: Take 60 mg by mouth        Facility-Administered Medications: None       Past Medical History:   Diagnosis Date    Anxiety     Bipolar 1 disorder (HCC)     Depression     IBS (irritable bowel syndrome)     Insomnia     Migraine     PTSD (post-traumatic stress disorder)        Past Surgical History:   Procedure Laterality Date    ANKLE HARDWARE REMOVAL      ANKLE SURGERY      CHOLECYSTECTOMY      MOUTH SURGERY  2011    metal placed from broken jaw    OTHER SURGICAL HISTORY      unlisted craniofacial and maxillofacial procedure       History reviewed  No pertinent family history  I have reviewed and agree with the history as documented  Social History   Substance Use Topics    Smoking status: Current Every Day Smoker     Packs/day: 0 50    Smokeless tobacco: Never Used      Comment: Current some day smoker per [employer]    Alcohol use Yes      Comment: ocassional        Review of Systems   Constitutional: Negative for fever  HENT: Negative for facial swelling  Eyes: Negative for pain  Respiratory: Negative for choking  Gastrointestinal: Negative for abdominal pain, nausea and vomiting  Endocrine: Negative for polydipsia  Genitourinary: Negative for dysuria  Musculoskeletal: Negative for myalgias  Skin: Positive for rash  Allergic/Immunologic: Negative for food allergies  Neurological: Negative for seizures  Hematological: Bruises/bleeds easily  Psychiatric/Behavioral: Negative for behavioral problems  Physical Exam  Physical Exam   Constitutional: She appears well-developed and well-nourished  No distress  HENT:   Head: Normocephalic and atraumatic  Right Ear: External ear normal    Left Ear: External ear normal    Eyes: Conjunctivae are normal    Neck: Neck supple  No JVD present  Cardiovascular: Normal rate  Pulmonary/Chest: Effort normal    Abdominal: She exhibits no distension  Obese abdomen  Musculoskeletal: She exhibits no edema, tenderness or deformity  Lymphadenopathy:     She has no cervical adenopathy  Neurological: She is alert  Skin: Skin is warm  Bruising noted  She is not diaphoretic              Vital Signs  ED Triage Vitals [06/22/18 1917]   Temperature Pulse Respirations Blood Pressure SpO2 98 3 °F (36 8 °C) 67 16 141/86 99 %      Temp Source Heart Rate Source Patient Position - Orthostatic VS BP Location FiO2 (%)   Oral Monitor Sitting Right arm --      Pain Score       No Pain           Vitals:    06/22/18 1917 06/22/18 2223   BP: 141/86 145/70   Pulse: 67 61   Patient Position - Orthostatic VS: Sitting Lying       Visual Acuity      ED Medications  Medications - No data to display    Diagnostic Studies  Results Reviewed     Procedure Component Value Units Date/Time    Comprehensive metabolic panel [[id number]] Collected:  06/22/18 2039    Lab Status:  Final result Specimen:  Blood from Arm, Right Updated:  06/22/18 2103     Sodium 141 mmol/L      Potassium 3 7 mmol/L      Chloride 105 mmol/L      CO2 27 mmol/L      Anion Gap 9 mmol/L      BUN 8 mg/dL      Creatinine 0 80 mg/dL      Glucose 80 mg/dL      Calcium 8 6 mg/dL      AST 37 U/L      ALT 28 U/L      Alkaline Phosphatase 109 U/L      Total Protein 7 0 g/dL      Albumin 3 6 g/dL      Total Bilirubin 0 22 mg/dL      eGFR 98 ml/min/1 73sq m     Narrative:         National Kidney Disease Education Program recommendations are as follows:  GFR calculation is accurate only with a steady state creatinine  Chronic Kidney disease less than 60 ml/min/1 73 sq  meters  Kidney failure less than 15 ml/min/1 73 sq  meters      Protime-INR [[id number]]  (Normal) Collected:  06/22/18 2039    Lab Status:  Final result Specimen:  Blood from Arm, Right Updated:  06/22/18 2102     Protime 12 9 seconds      INR 0 96    APTT [[id number]]  (Normal) Collected:  06/22/18 2039    Lab Status:  Final result Specimen:  Blood from Arm, Right Updated:  06/22/18 2102     PTT 30 seconds     CBC and differential [[id number]]  (Abnormal) Collected:  06/22/18 2039    Lab Status:  Final result Specimen:  Blood from Arm, Right Updated:  06/22/18 2053     WBC 8 20 Thousand/uL      RBC 4 20 Million/uL      Hemoglobin 11 5 g/dL      Hematocrit 36 1 %      MCV 86 fL      MCH 27 4 pg      MCHC 31 9 g/dL      RDW 18 0 (H) %      MPV 10 3 fL      Platelets 147 Thousands/uL      nRBC 0 /100 WBCs      Neutrophils Relative 50 %      Lymphocytes Relative 39 %      Monocytes Relative 8 %      Eosinophils Relative 2 %      Basophils Relative 0 %      Neutrophils Absolute 4 14 Thousands/µL      Lymphocytes Absolute 3 23 Thousands/µL      Monocytes Absolute 0 62 Thousand/µL      Eosinophils Absolute 0 18 Thousand/µL      Basophils Absolute 0 03 Thousands/µL                  VAS lower limb venous duplex study, complete bilateral    (Results Pending)              Procedures  Procedures       Phone Contacts  ED Phone Contact    ED Course                               MDM  Number of Diagnoses or Management Options  Bruising:   History of DVT in adulthood:   Diagnosis management comments: 72-year-old female presents emergency department for spontaneous bruising right inner thigh  Patient shows pictures of initial bruising which is approximately three five days ago  In comparison this area does appear to be healing with multiple changes in color  Is mildly tender  Patient did express fear of DVT as she has had one in the past   Labs reviewed  Ultrasound has been performed no evidence of DVT  I have educated patient of her labs as well as radiographic information  At this time patient is felt stable for discharge to home  Patient was recommended to follow up with her known primary care  Patient educated on persistent or worsening signs symptoms shortness of breath bilateral or single unilateral leg edema calf pain or any concern for DVT to follow up with primary care and/or return to the emergency department  Patient is understanding and agreement         Amount and/or Complexity of Data Reviewed  Clinical lab tests: ordered and reviewed  Tests in the radiology section of CPT®: ordered and reviewed      CritCare Time    Disposition  Final diagnoses:   Bruising   History of DVT in adulthood     Time reflects when diagnosis was documented in both MDM as applicable and the Disposition within this note     Time User Action Codes Description Comment    6/22/2018 10:38 PM Le[place]ord [place] Add [place] Yo  8XXA] Bruising     6/22/2018 10:38 PM Shelia Jerez Add [T67 [id number]] History of DVT in adulthood       ED Disposition     ED Disposition Condition Comment    Discharge  Vivian A Day discharge to home/self care  Condition at discharge: Stable        Follow-up Information     Follow up With Specialties Details Why Contact Info    Casey Canales PA-C Family Medicine, Physician Assistant   [address] [place] [address] Wang JEFFERY [address]  [phone]            Discharge Medication List as of 6/22/2018 10:39 PM      CONTINUE these medications which have NOT CHANGED    Details   albuterol (PROVENTIL HFA,VENTOLIN HFA) 90 mcg/act inhaler Inhale 2 puffs every 6 (six) hours as needed for wheezing or shortness of breath, Starting Fri 1/26/2018, Normal      clonazePAM (KlonoPIN) 2 mg tablet Take 2 mg by mouth 3 (three) times a day as needed for seizures  , Until Discontinued, Historical Med      ondansetron (ZOFRAN-ODT) 4 mg disintegrating tablet Take 1 tablet by mouth every 8 (eight) hours as needed for nausea or vomiting, Starting 4/16/2017, Until Discontinued, Print      prazosin (MINIPRESS) 5 mg capsule Take 10 mg by mouth daily at bedtime  , Historical Med      propranolol (INDERAL) 10 mg tablet Take 10 mg by mouth 3 (three) times a day as needed  , Until Discontinued, Historical Med      sertraline (ZOLOFT) 100 mg tablet Take 100 mg by mouth 2 (two) times a day , Until Discontinued, Historical Med      ziprasidone (GEODON) 40 mg capsule Take 60 mg by mouth  , Starting Tue 11/7/2017, Historical Med           No discharge procedures on file      ED Provider  Electronically Signed by           Stevenson Cranker, PA-C  06/23/18 6737

## 2018-06-24 PROCEDURE — 93970 EXTREMITY STUDY: CPT | Performed by: SURGERY

## 2018-10-18 ENCOUNTER — TRANSCRIBE ORDERS (OUTPATIENT)
Dept: ADMINISTRATIVE | Facility: HOSPITAL | Age: 33
End: 2018-10-18

## 2018-10-18 ENCOUNTER — APPOINTMENT (OUTPATIENT)
Dept: LAB | Facility: HOSPITAL | Age: 33
End: 2018-10-18
Payer: COMMERCIAL

## 2018-10-18 DIAGNOSIS — Z01.812 PRE-OPERATIVE LABORATORY EXAMINATION: ICD-10-CM

## 2018-10-18 DIAGNOSIS — S92.902A CLOSED FRACTURE OF BONE OF LEFT FOOT, INITIAL ENCOUNTER: ICD-10-CM

## 2018-10-18 DIAGNOSIS — Z01.812 PRE-OPERATIVE LABORATORY EXAMINATION: Primary | ICD-10-CM

## 2018-10-18 LAB
ANION GAP SERPL CALCULATED.3IONS-SCNC: 9 MMOL/L (ref 4–13)
BASOPHILS # BLD AUTO: 0.05 THOUSANDS/ΜL (ref 0–0.1)
BASOPHILS NFR BLD AUTO: 0 % (ref 0–1)
BUN SERPL-MCNC: 7 MG/DL (ref 5–25)
CALCIUM SERPL-MCNC: 9.5 MG/DL (ref 8.3–10.1)
CHLORIDE SERPL-SCNC: 101 MMOL/L (ref 100–108)
CO2 SERPL-SCNC: 25 MMOL/L (ref 21–32)
CREAT SERPL-MCNC: 0.93 MG/DL (ref 0.6–1.3)
EOSINOPHIL # BLD AUTO: 0.07 THOUSAND/ΜL (ref 0–0.61)
EOSINOPHIL NFR BLD AUTO: 1 % (ref 0–6)
ERYTHROCYTE [DISTWIDTH] IN BLOOD BY AUTOMATED COUNT: 15.4 % (ref 11.6–15.1)
GFR SERPL CREATININE-BSD FRML MDRD: 81 ML/MIN/1.73SQ M
GLUCOSE P FAST SERPL-MCNC: 97 MG/DL (ref 65–99)
HCT VFR BLD AUTO: 42.5 % (ref 34.8–46.1)
HGB BLD-MCNC: 13.6 G/DL (ref 11.5–15.4)
IMM GRANULOCYTES # BLD AUTO: 0.03 THOUSAND/UL (ref 0–0.2)
IMM GRANULOCYTES NFR BLD AUTO: 0 % (ref 0–2)
LYMPHOCYTES # BLD AUTO: 2.46 THOUSANDS/ΜL (ref 0.6–4.47)
LYMPHOCYTES NFR BLD AUTO: 22 % (ref 14–44)
MCH RBC QN AUTO: 29.1 PG (ref 26.8–34.3)
MCHC RBC AUTO-ENTMCNC: 32 G/DL (ref 31.4–37.4)
MCV RBC AUTO: 91 FL (ref 82–98)
MONOCYTES # BLD AUTO: 0.51 THOUSAND/ΜL (ref 0.17–1.22)
MONOCYTES NFR BLD AUTO: 5 % (ref 4–12)
NEUTROPHILS # BLD AUTO: 8 THOUSANDS/ΜL (ref 1.85–7.62)
NEUTS SEG NFR BLD AUTO: 72 % (ref 43–75)
NRBC BLD AUTO-RTO: 0 /100 WBCS
PLATELET # BLD AUTO: 304 THOUSANDS/UL (ref 149–390)
PMV BLD AUTO: 10.3 FL (ref 8.9–12.7)
POTASSIUM SERPL-SCNC: 3.8 MMOL/L (ref 3.5–5.3)
RBC # BLD AUTO: 4.67 MILLION/UL (ref 3.81–5.12)
SODIUM SERPL-SCNC: 135 MMOL/L (ref 136–145)
WBC # BLD AUTO: 11.12 THOUSAND/UL (ref 4.31–10.16)

## 2018-10-18 PROCEDURE — 36415 COLL VENOUS BLD VENIPUNCTURE: CPT

## 2018-10-18 PROCEDURE — 85025 COMPLETE CBC W/AUTO DIFF WBC: CPT

## 2018-10-18 PROCEDURE — 80048 BASIC METABOLIC PNL TOTAL CA: CPT

## 2018-12-11 ENCOUNTER — CONSULT (OUTPATIENT)
Dept: FAMILY MEDICINE CLINIC | Facility: CLINIC | Age: 33
End: 2018-12-11
Payer: COMMERCIAL

## 2018-12-11 VITALS
HEART RATE: 102 BPM | HEIGHT: 64 IN | WEIGHT: 199 LBS | SYSTOLIC BLOOD PRESSURE: 120 MMHG | DIASTOLIC BLOOD PRESSURE: 60 MMHG | RESPIRATION RATE: 18 BRPM | TEMPERATURE: 97.2 F | BODY MASS INDEX: 33.97 KG/M2 | OXYGEN SATURATION: 95 %

## 2018-12-11 DIAGNOSIS — Z01.818 PRE-OP EXAM: Primary | ICD-10-CM

## 2018-12-11 DIAGNOSIS — S92.252K CLOSED DISPLACED FRACTURE OF NAVICULAR BONE OF LEFT FOOT WITH NONUNION, SUBSEQUENT ENCOUNTER: ICD-10-CM

## 2018-12-11 PROCEDURE — 99214 OFFICE O/P EST MOD 30 MIN: CPT | Performed by: PHYSICIAN ASSISTANT

## 2018-12-11 NOTE — PROGRESS NOTES
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, subsequent encounter          Subjective:      Patient ID: Georgia You is a 35 y o  female  66-year-old female presenting for preop clearance for left foot surgery  Patient has EKG and lab work scheduled for this week  Patient has no concerns or questions today  Pre-Op Visit (Brief): The patient is being seen for a preoperative visit  The procedure is left midfoot fusion with Dr Geovanna David  The indication for surgery is closed displaced fracture of navicular      Surgical Risk Assessment:   Prior Anesthesia: Yes   Prior adverse reaction to general anesthesia:No      Pertinent Past Medical History  Neck osteoarthrosis: No  Seizure disorder:No  Asthma:No  Seizure only with morphine: No  Angina:No  Arrhythmia:No  CAD:No  CAD without prior MI:No  CAD without recent PCI:No  CHF:No  Chronic liver disease:No  Acute hepatitis::No  Coagulation delay:No  Primary hypercoagulable state:No  Secondary hypercoagulable state:No  pulmonary embolism:No  DVT:No  Use anticoagulants:No  Diabetes:No  Insulin use:No  Thyroid disease:No  TMJ osteoarthrosis:No  Wear dentures:Yes   CVA:No  COPD:No  ROYAL:No  Renal disease:No  Low serum albumin:No  Obesity:Yes     Exercise Capacity  Are you able to walk two flights of stairs::Yes   Are you able to walk four blocks without symptoms:Yes     Lifestyle Factors  Alcohol use:No  Tobacco use:No  Illegal drug use:No    Symptoms  Easy bleeding:No  Easy bruising:No  Frequent nosebleeds:No  Chest pain:No  Cough:No  Dyspnea:No  Edema:No  Palpitations:No  Wheezing:No    Pertinent Family History:  Any family members with the following problems:No  Rx to anesthesia:No  Aneurysm:No  Bleeding problems:No  Sudden early deaths:No  Ischemic heart disease:No  Stroke:No  The following portions of the patient's history were reviewed and updated as appropriate:   She  has a past medical history of Anxiety; Bipolar 1 disorder ([place] [address] ); Depression; IBS (irritable bowel syndrome); Insomnia; Migraine; and PTSD (post-traumatic stress disorder)  She   Patient Active Problem List    Diagnosis Date Noted    Bipolar 1 disorder ([place] [address] ) 02/01/2018    Abnormal Papanicolaou smear of vagina 01/27/2016    Obesity 08/27/2014    Sleep disorder 08/06/2014     She  has a past surgical history that includes Mouth surgery (2011); Cholecystectomy; Ankle hardware removal; Ankle surgery; and Other surgical history  Her family history is not on file  She  reports that she has quit smoking  She smoked 0 50 packs per day  She has never used smokeless tobacco  She reports that she drinks alcohol  She reports that she does not use drugs  Current Outpatient Prescriptions   Medication Sig Dispense Refill    albuterol (PROVENTIL HFA,VENTOLIN HFA) 90 mcg/act inhaler Inhale 2 puffs every 6 (six) hours as needed for wheezing or shortness of breath 1 Inhaler 0    clonazePAM (KlonoPIN) 2 mg tablet Take 2 mg by mouth 3 (three) times a day as needed for seizures   ondansetron (ZOFRAN-ODT) 4 mg disintegrating tablet Take 1 tablet by mouth every 8 (eight) hours as needed for nausea or vomiting 10 tablet 0    prazosin (MINIPRESS) 5 mg capsule Take 10 mg by mouth daily at bedtime        propranolol (INDERAL) 10 mg tablet Take 10 mg by mouth 3 (three) times a day as needed        sertraline (ZOLOFT) 100 mg tablet Take 100 mg by mouth 2 (two) times a day        ziprasidone (GEODON) 40 mg capsule Take 60 mg by mouth         No current facility-administered medications for this visit  She is allergic to codeine; naproxen; and wellbutrin [bupropion]       Review of Systems   Constitutional: Negative for chills, diaphoresis, fatigue and fever  HENT: Negative for congestion, ear pain, hearing loss, rhinorrhea and sore throat  Eyes: Negative for pain and visual disturbance  Respiratory: Negative for cough, shortness of breath and wheezing  Cardiovascular: Negative for chest pain, palpitations and leg swelling  Gastrointestinal: Negative for abdominal pain, blood in stool, constipation, diarrhea, nausea and vomiting  Endocrine: Negative for cold intolerance, heat intolerance and polyuria  Genitourinary: Negative for difficulty urinating, dysuria, frequency, hematuria and urgency  Musculoskeletal: Negative for arthralgias, joint swelling and myalgias  Skin: Negative for rash and wound  Neurological: Negative for dizziness, syncope, weakness, numbness and headaches  Psychiatric/Behavioral: Negative for dysphoric mood and sleep disturbance  The patient is not nervous/anxious  Objective:      /60 (BP Location: Left arm, Patient Position: Sitting, Cuff Size: Standard)   Pulse 102   Temp (!) 97 2 °F (36 2 °C) (Tympanic)   Resp 18   Ht 5' 3 5" (1 613 m)   Wt 90 3 kg (199 lb)   LMP 12/07/2018 (Exact Date)   SpO2 95%   Breastfeeding? No   BMI 34 70 kg/m²          Physical Exam   Constitutional: She is oriented to person, place, and time  She appears well-developed and well-nourished  No distress  HENT:   Right Ear: Hearing, tympanic membrane, external ear and ear canal normal    Left Ear: Hearing, tympanic membrane, external ear and ear canal normal    Nose: Nose normal    Mouth/Throat: Oropharynx is clear and moist and mucous membranes are normal  No oropharyngeal exudate  Eyes: Pupils are equal, round, and reactive to light  Conjunctivae, EOM and lids are normal    Neck: Normal range of motion   Neck supple  Cardiovascular: Normal rate, regular rhythm, normal heart sounds and normal pulses  Exam reveals no gallop and no friction rub  No murmur heard  Pulmonary/Chest: Effort normal and breath sounds normal  No respiratory distress  She has no wheezes  She has no rales  Abdominal: Soft  Normal appearance and bowel sounds are normal  She exhibits no distension  There is no tenderness  There is no rebound and no guarding  Musculoskeletal: Normal range of motion  She exhibits no edema  Lymphadenopathy:     She has no cervical adenopathy  Neurological: She is alert and oriented to person, place, and time  She has normal reflexes  No cranial nerve deficit  Skin: Skin is warm and dry  No rash noted  She is not diaphoretic  Psychiatric: She has a normal mood and affect  Her behavior is normal  Thought content normal    Nursing note and vitals reviewed

## 2018-12-17 ENCOUNTER — HOSPITAL ENCOUNTER (OUTPATIENT)
Dept: NON INVASIVE DIAGNOSTICS | Facility: HOSPITAL | Age: 33
Discharge: HOME | End: 2018-12-17
Payer: COMMERCIAL

## 2018-12-17 ENCOUNTER — TRANSCRIBE ORDERS (OUTPATIENT)
Dept: ADMINISTRATIVE | Facility: HOSPITAL | Age: 33
End: 2018-12-17

## 2018-12-17 ENCOUNTER — APPOINTMENT (OUTPATIENT)
Dept: LAB | Facility: HOSPITAL | Age: 33
End: 2018-12-17
Payer: COMMERCIAL

## 2018-12-17 DIAGNOSIS — M19.172 TRAUMATIC DEGENERATIVE JOINT DISEASE OF ANKLE, LEFT: ICD-10-CM

## 2018-12-17 DIAGNOSIS — Z01.812 PRE-OPERATIVE LABORATORY EXAMINATION: ICD-10-CM

## 2018-12-17 DIAGNOSIS — Z01.812 PRE-OPERATIVE LABORATORY EXAMINATION: Primary | ICD-10-CM

## 2018-12-17 LAB
ANION GAP SERPL CALCULATED.3IONS-SCNC: 9 MMOL/L (ref 4–13)
BASOPHILS # BLD AUTO: 0.06 THOUSANDS/ΜL (ref 0–0.1)
BASOPHILS NFR BLD AUTO: 1 % (ref 0–1)
BUN SERPL-MCNC: 8 MG/DL (ref 5–25)
CALCIUM SERPL-MCNC: 8.8 MG/DL (ref 8.3–10.1)
CHLORIDE SERPL-SCNC: 105 MMOL/L (ref 100–108)
CO2 SERPL-SCNC: 26 MMOL/L (ref 21–32)
CREAT SERPL-MCNC: 0.73 MG/DL (ref 0.6–1.3)
EOSINOPHIL # BLD AUTO: 0.13 THOUSAND/ΜL (ref 0–0.61)
EOSINOPHIL NFR BLD AUTO: 1 % (ref 0–6)
ERYTHROCYTE [DISTWIDTH] IN BLOOD BY AUTOMATED COUNT: 16.3 % (ref 11.6–15.1)
GFR SERPL CREATININE-BSD FRML MDRD: 109 ML/MIN/1.73SQ M
GLUCOSE SERPL-MCNC: 100 MG/DL (ref 65–140)
HCT VFR BLD AUTO: 43.6 % (ref 34.8–46.1)
HGB BLD-MCNC: 13.5 G/DL (ref 11.5–15.4)
IMM GRANULOCYTES # BLD AUTO: 0.06 THOUSAND/UL (ref 0–0.2)
IMM GRANULOCYTES NFR BLD AUTO: 1 % (ref 0–2)
LYMPHOCYTES # BLD AUTO: 2.16 THOUSANDS/ΜL (ref 0.6–4.47)
LYMPHOCYTES NFR BLD AUTO: 16 % (ref 14–44)
MCH RBC QN AUTO: 29.5 PG (ref 26.8–34.3)
MCHC RBC AUTO-ENTMCNC: 31 G/DL (ref 31.4–37.4)
MCV RBC AUTO: 95 FL (ref 82–98)
MONOCYTES # BLD AUTO: 0.82 THOUSAND/ΜL (ref 0.17–1.22)
MONOCYTES NFR BLD AUTO: 6 % (ref 4–12)
NEUTROPHILS # BLD AUTO: 10.05 THOUSANDS/ΜL (ref 1.85–7.62)
NEUTS SEG NFR BLD AUTO: 75 % (ref 43–75)
NRBC BLD AUTO-RTO: 0 /100 WBCS
PLATELET # BLD AUTO: 244 THOUSANDS/UL (ref 149–390)
PMV BLD AUTO: 11.1 FL (ref 8.9–12.7)
POTASSIUM SERPL-SCNC: 4 MMOL/L (ref 3.5–5.3)
RBC # BLD AUTO: 4.58 MILLION/UL (ref 3.81–5.12)
SODIUM SERPL-SCNC: 140 MMOL/L (ref 136–145)
WBC # BLD AUTO: 13.28 THOUSAND/UL (ref 4.31–10.16)

## 2018-12-17 PROCEDURE — 36415 COLL VENOUS BLD VENIPUNCTURE: CPT

## 2018-12-17 PROCEDURE — 93005 ELECTROCARDIOGRAM TRACING: CPT

## 2018-12-17 PROCEDURE — 85025 COMPLETE CBC W/AUTO DIFF WBC: CPT

## 2018-12-17 PROCEDURE — 80048 BASIC METABOLIC PNL TOTAL CA: CPT

## 2018-12-18 LAB
ATRIAL RATE: 86 BPM
P AXIS: 24 DEGREES
PR INTERVAL: 152 MS
QRS AXIS: 53 DEGREES
QRSD INTERVAL: 72 MS
QT INTERVAL: 358 MS
QTC INTERVAL: 428 MS
T WAVE AXIS: 49 DEGREES
VENTRICULAR RATE: 86 BPM

## 2018-12-18 PROCEDURE — 93010 ELECTROCARDIOGRAM REPORT: CPT | Performed by: INTERNAL MEDICINE

## 2019-05-10 ENCOUNTER — CONSULT (OUTPATIENT)
Dept: FAMILY MEDICINE CLINIC | Facility: CLINIC | Age: 34
End: 2019-05-10

## 2019-05-10 VITALS
TEMPERATURE: 97.7 F | HEIGHT: 64 IN | BODY MASS INDEX: 35.85 KG/M2 | RESPIRATION RATE: 18 BRPM | OXYGEN SATURATION: 98 % | HEART RATE: 68 BPM | WEIGHT: 210 LBS | DIASTOLIC BLOOD PRESSURE: 78 MMHG | SYSTOLIC BLOOD PRESSURE: 126 MMHG

## 2019-05-10 DIAGNOSIS — Z01.810 PRE-OPERATIVE CARDIOVASCULAR EXAMINATION: Primary | ICD-10-CM

## 2019-05-10 DIAGNOSIS — S92.902D CLOSED FRACTURE OF LEFT FOOT WITH ROUTINE HEALING, SUBSEQUENT ENCOUNTER: ICD-10-CM

## 2019-05-10 DIAGNOSIS — M27.0: ICD-10-CM

## 2019-05-10 DIAGNOSIS — M19.072 PRIMARY OSTEOARTHRITIS OF LEFT FOOT: ICD-10-CM

## 2019-05-10 PROBLEM — M19.171 POST-TRAUMATIC ARTHRITIS OF RIGHT ANKLE: Status: ACTIVE | Noted: 2019-01-15

## 2019-05-10 PROBLEM — S92.902B OPEN FRACTURE OF BONE OF LEFT FOOT: Status: ACTIVE | Noted: 2018-11-20

## 2019-05-10 PROBLEM — S92.902A CLOSED FRACTURE OF BONE OF LEFT FOOT: Status: ACTIVE | Noted: 2018-10-17

## 2019-05-10 PROBLEM — F43.10 PTSD (POST-TRAUMATIC STRESS DISORDER): Status: ACTIVE | Noted: 2019-01-15

## 2019-05-10 PROBLEM — F41.9 ANXIETY: Status: ACTIVE | Noted: 2019-01-15

## 2019-05-10 PROBLEM — F32.A DEPRESSION: Status: ACTIVE | Noted: 2019-01-15

## 2019-05-10 PROCEDURE — 99214 OFFICE O/P EST MOD 30 MIN: CPT | Performed by: PHYSICIAN ASSISTANT

## 2019-05-10 RX ORDER — IBUPROFEN 600 MG/1
600 TABLET ORAL EVERY 6 HOURS PRN
Qty: 30 TABLET | Refills: 0 | Status: SHIPPED | OUTPATIENT
Start: 2019-05-10 | End: 2019-05-29

## 2019-05-29 RX ORDER — ALBUTEROL SULFATE 90 UG/1
2 AEROSOL, METERED RESPIRATORY (INHALATION) EVERY 6 HOURS PRN
COMMUNITY
End: 2019-06-26

## 2019-06-02 ENCOUNTER — ANESTHESIA EVENT (OUTPATIENT)
Dept: PERIOP | Facility: HOSPITAL | Age: 34
End: 2019-06-02
Payer: COMMERCIAL

## 2019-06-03 ENCOUNTER — HOSPITAL ENCOUNTER (OUTPATIENT)
Facility: HOSPITAL | Age: 34
Setting detail: OUTPATIENT SURGERY
Discharge: HOME | End: 2019-06-03
Attending: DENTIST | Admitting: DENTIST
Payer: COMMERCIAL

## 2019-06-03 ENCOUNTER — ANESTHESIA (OUTPATIENT)
Dept: PERIOP | Facility: HOSPITAL | Age: 34
End: 2019-06-03
Payer: COMMERCIAL

## 2019-06-03 VITALS
OXYGEN SATURATION: 98 % | DIASTOLIC BLOOD PRESSURE: 72 MMHG | RESPIRATION RATE: 18 BRPM | HEIGHT: 63 IN | TEMPERATURE: 100.5 F | SYSTOLIC BLOOD PRESSURE: 133 MMHG | HEART RATE: 48 BPM | WEIGHT: 210 LBS | BODY MASS INDEX: 37.21 KG/M2

## 2019-06-03 LAB
BASOPHILS # BLD AUTO: 0.06 THOUSANDS/ΜL (ref 0–0.1)
BASOPHILS NFR BLD AUTO: 1 % (ref 0–1)
EOSINOPHIL # BLD AUTO: 0.15 THOUSAND/ΜL (ref 0–0.61)
EOSINOPHIL NFR BLD AUTO: 2 % (ref 0–6)
ERYTHROCYTE [DISTWIDTH] IN BLOOD BY AUTOMATED COUNT: 13.9 % (ref 11.6–15.1)
EXT PREGNANCY TEST URINE: NEGATIVE
HCT VFR BLD AUTO: 39.7 % (ref 34.8–46.1)
HGB BLD-MCNC: 13.1 G/DL (ref 11.5–15.4)
IMM GRANULOCYTES # BLD AUTO: 0.05 THOUSAND/UL (ref 0–0.2)
IMM GRANULOCYTES NFR BLD AUTO: 1 % (ref 0–2)
LYMPHOCYTES # BLD AUTO: 2.34 THOUSANDS/ΜL (ref 0.6–4.47)
LYMPHOCYTES NFR BLD AUTO: 30 % (ref 14–44)
MCH RBC QN AUTO: 31.4 PG (ref 26.8–34.3)
MCHC RBC AUTO-ENTMCNC: 33 G/DL (ref 31.4–37.4)
MCV RBC AUTO: 95 FL (ref 82–98)
MONOCYTES # BLD AUTO: 0.54 THOUSAND/ΜL (ref 0.17–1.22)
MONOCYTES NFR BLD AUTO: 7 % (ref 4–12)
NEUTROPHILS # BLD AUTO: 4.77 THOUSANDS/ΜL (ref 1.85–7.62)
NEUTS SEG NFR BLD AUTO: 59 % (ref 43–75)
NRBC BLD AUTO-RTO: 0 /100 WBCS
PLATELET # BLD AUTO: 216 THOUSANDS/UL (ref 149–390)
PMV BLD AUTO: 10.5 FL (ref 8.9–12.7)
RBC # BLD AUTO: 4.17 MILLION/UL (ref 3.81–5.12)
WBC # BLD AUTO: 7.91 THOUSAND/UL (ref 4.31–10.16)

## 2019-06-03 PROCEDURE — 85025 COMPLETE CBC W/AUTO DIFF WBC: CPT | Performed by: DENTIST

## 2019-06-03 PROCEDURE — 81025 URINE PREGNANCY TEST: CPT | Performed by: DENTIST

## 2019-06-03 RX ORDER — ONDANSETRON 2 MG/ML
4 INJECTION INTRAMUSCULAR; INTRAVENOUS ONCE AS NEEDED
Status: DISCONTINUED | OUTPATIENT
Start: 2019-06-03 | End: 2019-06-03 | Stop reason: HOSPADM

## 2019-06-03 RX ORDER — SUCCINYLCHOLINE/SOD CL,ISO/PF 100 MG/5ML
SYRINGE (ML) INTRAVENOUS AS NEEDED
Status: DISCONTINUED | OUTPATIENT
Start: 2019-06-03 | End: 2019-06-03 | Stop reason: SURG

## 2019-06-03 RX ORDER — ONDANSETRON 2 MG/ML
4 INJECTION INTRAMUSCULAR; INTRAVENOUS EVERY 6 HOURS PRN
Status: DISCONTINUED | OUTPATIENT
Start: 2019-06-03 | End: 2019-06-03 | Stop reason: HOSPADM

## 2019-06-03 RX ORDER — ONDANSETRON 2 MG/ML
4 INJECTION INTRAMUSCULAR; INTRAVENOUS EVERY 6 HOURS PRN
Status: DISCONTINUED | OUTPATIENT
Start: 2019-06-03 | End: 2019-06-03

## 2019-06-03 RX ORDER — PROPOFOL 10 MG/ML
INJECTION, EMULSION INTRAVENOUS AS NEEDED
Status: DISCONTINUED | OUTPATIENT
Start: 2019-06-03 | End: 2019-06-03 | Stop reason: SURG

## 2019-06-03 RX ORDER — HYDROMORPHONE HCL/PF 1 MG/ML
0.2 SYRINGE (ML) INJECTION
Status: DISCONTINUED | OUTPATIENT
Start: 2019-06-03 | End: 2019-06-03 | Stop reason: HOSPADM

## 2019-06-03 RX ORDER — BUPIVACAINE HYDROCHLORIDE AND EPINEPHRINE 5; 5 MG/ML; UG/ML
INJECTION, SOLUTION PERINEURAL AS NEEDED
Status: DISCONTINUED | OUTPATIENT
Start: 2019-06-03 | End: 2019-06-03 | Stop reason: HOSPADM

## 2019-06-03 RX ORDER — NEOSTIGMINE METHYLSULFATE 1 MG/ML
INJECTION INTRAVENOUS AS NEEDED
Status: DISCONTINUED | OUTPATIENT
Start: 2019-06-03 | End: 2019-06-03 | Stop reason: SURG

## 2019-06-03 RX ORDER — ONDANSETRON 2 MG/ML
INJECTION INTRAMUSCULAR; INTRAVENOUS AS NEEDED
Status: DISCONTINUED | OUTPATIENT
Start: 2019-06-03 | End: 2019-06-03 | Stop reason: SURG

## 2019-06-03 RX ORDER — ALBUTEROL SULFATE 90 UG/1
AEROSOL, METERED RESPIRATORY (INHALATION) AS NEEDED
Status: DISCONTINUED | OUTPATIENT
Start: 2019-06-03 | End: 2019-06-03 | Stop reason: SURG

## 2019-06-03 RX ORDER — LABETALOL 20 MG/4 ML (5 MG/ML) INTRAVENOUS SYRINGE
5
Status: DISCONTINUED | OUTPATIENT
Start: 2019-06-03 | End: 2019-06-03 | Stop reason: HOSPADM

## 2019-06-03 RX ORDER — LIDOCAINE HYDROCHLORIDE 10 MG/ML
0.5 INJECTION, SOLUTION EPIDURAL; INFILTRATION; INTRACAUDAL; PERINEURAL ONCE AS NEEDED
Status: COMPLETED | OUTPATIENT
Start: 2019-06-03 | End: 2019-06-03

## 2019-06-03 RX ORDER — CEFAZOLIN SODIUM 2 G/50ML
SOLUTION INTRAVENOUS AS NEEDED
Status: DISCONTINUED | OUTPATIENT
Start: 2019-06-03 | End: 2019-06-03 | Stop reason: SURG

## 2019-06-03 RX ORDER — SODIUM CHLORIDE, SODIUM LACTATE, POTASSIUM CHLORIDE, CALCIUM CHLORIDE 600; 310; 30; 20 MG/100ML; MG/100ML; MG/100ML; MG/100ML
125 INJECTION, SOLUTION INTRAVENOUS CONTINUOUS
Status: DISCONTINUED | OUTPATIENT
Start: 2019-06-03 | End: 2019-06-03 | Stop reason: HOSPADM

## 2019-06-03 RX ORDER — ROCURONIUM BROMIDE 10 MG/ML
INJECTION, SOLUTION INTRAVENOUS AS NEEDED
Status: DISCONTINUED | OUTPATIENT
Start: 2019-06-03 | End: 2019-06-03 | Stop reason: SURG

## 2019-06-03 RX ORDER — GLYCOPYRROLATE 0.2 MG/ML
INJECTION INTRAMUSCULAR; INTRAVENOUS AS NEEDED
Status: DISCONTINUED | OUTPATIENT
Start: 2019-06-03 | End: 2019-06-03 | Stop reason: SURG

## 2019-06-03 RX ORDER — HYDROMORPHONE HYDROCHLORIDE 2 MG/ML
INJECTION, SOLUTION INTRAMUSCULAR; INTRAVENOUS; SUBCUTANEOUS AS NEEDED
Status: DISCONTINUED | OUTPATIENT
Start: 2019-06-03 | End: 2019-06-03 | Stop reason: SURG

## 2019-06-03 RX ORDER — FENTANYL CITRATE 50 UG/ML
INJECTION, SOLUTION INTRAMUSCULAR; INTRAVENOUS AS NEEDED
Status: DISCONTINUED | OUTPATIENT
Start: 2019-06-03 | End: 2019-06-03 | Stop reason: SURG

## 2019-06-03 RX ORDER — ALBUTEROL SULFATE 2.5 MG/3ML
2.5 SOLUTION RESPIRATORY (INHALATION) ONCE AS NEEDED
Status: DISCONTINUED | OUTPATIENT
Start: 2019-06-03 | End: 2019-06-03 | Stop reason: HOSPADM

## 2019-06-03 RX ORDER — LIDOCAINE HYDROCHLORIDE 10 MG/ML
INJECTION, SOLUTION INFILTRATION; PERINEURAL AS NEEDED
Status: DISCONTINUED | OUTPATIENT
Start: 2019-06-03 | End: 2019-06-03 | Stop reason: SURG

## 2019-06-03 RX ORDER — LIDOCAINE HYDROCHLORIDE AND EPINEPHRINE 10; 10 MG/ML; UG/ML
INJECTION, SOLUTION INFILTRATION; PERINEURAL AS NEEDED
Status: DISCONTINUED | OUTPATIENT
Start: 2019-06-03 | End: 2019-06-03 | Stop reason: HOSPADM

## 2019-06-03 RX ORDER — OXYCODONE HYDROCHLORIDE AND ACETAMINOPHEN 5; 325 MG/1; MG/1
1 TABLET ORAL EVERY 4 HOURS PRN
Status: DISCONTINUED | OUTPATIENT
Start: 2019-06-03 | End: 2019-06-03 | Stop reason: HOSPADM

## 2019-06-03 RX ORDER — METOPROLOL TARTRATE 5 MG/5ML
INJECTION INTRAVENOUS AS NEEDED
Status: DISCONTINUED | OUTPATIENT
Start: 2019-06-03 | End: 2019-06-03 | Stop reason: SURG

## 2019-06-03 RX ORDER — DEXAMETHASONE SODIUM PHOSPHATE 10 MG/ML
INJECTION, SOLUTION INTRAMUSCULAR; INTRAVENOUS AS NEEDED
Status: DISCONTINUED | OUTPATIENT
Start: 2019-06-03 | End: 2019-06-03 | Stop reason: SURG

## 2019-06-03 RX ORDER — PROMETHAZINE HYDROCHLORIDE 25 MG/ML
12.5 INJECTION, SOLUTION INTRAMUSCULAR; INTRAVENOUS ONCE AS NEEDED
Status: DISCONTINUED | OUTPATIENT
Start: 2019-06-03 | End: 2019-06-03 | Stop reason: HOSPADM

## 2019-06-03 RX ORDER — METOCLOPRAMIDE HYDROCHLORIDE 5 MG/ML
INJECTION INTRAMUSCULAR; INTRAVENOUS AS NEEDED
Status: DISCONTINUED | OUTPATIENT
Start: 2019-06-03 | End: 2019-06-03 | Stop reason: SURG

## 2019-06-03 RX ORDER — MIDAZOLAM HYDROCHLORIDE 1 MG/ML
INJECTION INTRAMUSCULAR; INTRAVENOUS AS NEEDED
Status: DISCONTINUED | OUTPATIENT
Start: 2019-06-03 | End: 2019-06-03 | Stop reason: SURG

## 2019-06-03 RX ADMIN — DEXAMETHASONE SODIUM PHOSPHATE 10 MG: 10 INJECTION, SOLUTION INTRAMUSCULAR; INTRAVENOUS at 13:55

## 2019-06-03 RX ADMIN — MIDAZOLAM 2 MG: 1 INJECTION INTRAMUSCULAR; INTRAVENOUS at 13:21

## 2019-06-03 RX ADMIN — LIDOCAINE HYDROCHLORIDE 50 MG: 10 INJECTION, SOLUTION INFILTRATION; PERINEURAL at 13:39

## 2019-06-03 RX ADMIN — ALBUTEROL SULFATE 2 PUFF: 90 AEROSOL, METERED RESPIRATORY (INHALATION) at 13:26

## 2019-06-03 RX ADMIN — ROCURONIUM BROMIDE 18 MG: 10 INJECTION, SOLUTION INTRAVENOUS at 13:45

## 2019-06-03 RX ADMIN — Medication 100 MG: at 13:40

## 2019-06-03 RX ADMIN — ROCURONIUM BROMIDE 2 MG: 10 INJECTION, SOLUTION INTRAVENOUS at 13:39

## 2019-06-03 RX ADMIN — FENTANYL CITRATE 50 MCG: 50 INJECTION, SOLUTION INTRAMUSCULAR; INTRAVENOUS at 13:58

## 2019-06-03 RX ADMIN — GLYCOPYRROLATE 0.4 MG: 0.2 INJECTION, SOLUTION INTRAMUSCULAR; INTRAVENOUS at 14:14

## 2019-06-03 RX ADMIN — METOPROLOL TARTRATE 3 MG: 1 INJECTION, SOLUTION INTRAVENOUS at 14:20

## 2019-06-03 RX ADMIN — FENTANYL CITRATE 50 MCG: 50 INJECTION, SOLUTION INTRAMUSCULAR; INTRAVENOUS at 13:39

## 2019-06-03 RX ADMIN — PROPOFOL 100 MG: 10 INJECTION, EMULSION INTRAVENOUS at 13:45

## 2019-06-03 RX ADMIN — GLYCOPYRROLATE 0.2 MG: 0.2 INJECTION, SOLUTION INTRAMUSCULAR; INTRAVENOUS at 13:21

## 2019-06-03 RX ADMIN — PHENYLEPHRINE HYDROCHLORIDE 2 DROP: 1 SPRAY NASAL at 13:21

## 2019-06-03 RX ADMIN — METOCLOPRAMIDE 10 MG: 5 INJECTION, SOLUTION INTRAMUSCULAR; INTRAVENOUS at 14:21

## 2019-06-03 RX ADMIN — CEFAZOLIN SODIUM 2000 MG: 2 SOLUTION INTRAVENOUS at 13:55

## 2019-06-03 RX ADMIN — NEOSTIGMINE METHYLSULFATE 3 MG: 1 INJECTION, SOLUTION INTRAVENOUS at 14:14

## 2019-06-03 RX ADMIN — LIDOCAINE HYDROCHLORIDE 0.5 ML: 10 INJECTION, SOLUTION EPIDURAL; INFILTRATION; INTRACAUDAL; PERINEURAL at 12:45

## 2019-06-03 RX ADMIN — SODIUM CHLORIDE, SODIUM LACTATE, POTASSIUM CHLORIDE, AND CALCIUM CHLORIDE: .6; .31; .03; .02 INJECTION, SOLUTION INTRAVENOUS at 14:30

## 2019-06-03 RX ADMIN — SODIUM CHLORIDE, SODIUM LACTATE, POTASSIUM CHLORIDE, AND CALCIUM CHLORIDE 125 ML/HR: .6; .31; .03; .02 INJECTION, SOLUTION INTRAVENOUS at 12:45

## 2019-06-03 RX ADMIN — ONDANSETRON 4 MG: 2 INJECTION INTRAMUSCULAR; INTRAVENOUS at 13:55

## 2019-06-03 RX ADMIN — METOPROLOL TARTRATE 2 MG: 1 INJECTION, SOLUTION INTRAVENOUS at 14:00

## 2019-06-03 RX ADMIN — HYDROMORPHONE HYDROCHLORIDE 0.5 MG: 2 INJECTION, SOLUTION INTRAMUSCULAR; INTRAVENOUS; SUBCUTANEOUS at 14:22

## 2019-06-03 RX ADMIN — PROPOFOL 200 MG: 10 INJECTION, EMULSION INTRAVENOUS at 13:39

## 2019-06-26 ENCOUNTER — TELEPHONE (OUTPATIENT)
Dept: FAMILY MEDICINE CLINIC | Facility: CLINIC | Age: 34
End: 2019-06-26

## 2019-06-26 ENCOUNTER — OFFICE VISIT (OUTPATIENT)
Dept: FAMILY MEDICINE CLINIC | Facility: CLINIC | Age: 34
End: 2019-06-26

## 2019-06-26 VITALS
HEIGHT: 63 IN | OXYGEN SATURATION: 97 % | RESPIRATION RATE: 18 BRPM | TEMPERATURE: 97.8 F | BODY MASS INDEX: 37.21 KG/M2 | WEIGHT: 210 LBS | SYSTOLIC BLOOD PRESSURE: 124 MMHG | DIASTOLIC BLOOD PRESSURE: 78 MMHG | HEART RATE: 86 BPM

## 2019-06-26 DIAGNOSIS — Z80.1 FAMILY HISTORY OF LUNG CANCER: ICD-10-CM

## 2019-06-26 DIAGNOSIS — F17.210 CIGARETTE NICOTINE DEPENDENCE WITHOUT COMPLICATION: Primary | ICD-10-CM

## 2019-06-26 DIAGNOSIS — F41.9 ANXIETY: ICD-10-CM

## 2019-06-26 DIAGNOSIS — J45.20 MILD INTERMITTENT ASTHMA WITHOUT COMPLICATION: ICD-10-CM

## 2019-06-26 PROCEDURE — 99213 OFFICE O/P EST LOW 20 MIN: CPT | Performed by: PHYSICIAN ASSISTANT

## 2019-06-26 PROCEDURE — 3008F BODY MASS INDEX DOCD: CPT | Performed by: PHYSICIAN ASSISTANT

## 2019-06-26 RX ORDER — CLONAZEPAM 1 MG/1
1 TABLET ORAL 2 TIMES DAILY
Qty: 20 TABLET | Refills: 0 | Status: SHIPPED | OUTPATIENT
Start: 2019-06-26 | End: 2021-06-30

## 2019-06-26 RX ORDER — ALBUTEROL SULFATE 90 UG/1
2 AEROSOL, METERED RESPIRATORY (INHALATION) EVERY 6 HOURS PRN
Qty: 1 INHALER | Refills: 0 | Status: SHIPPED | OUTPATIENT
Start: 2019-06-26 | End: 2020-03-26 | Stop reason: SDUPTHER

## 2019-06-26 RX ORDER — BUPROPION HYDROCHLORIDE 150 MG/1
TABLET, EXTENDED RELEASE ORAL
Qty: 180 TABLET | Refills: 1 | Status: SHIPPED | OUTPATIENT
Start: 2019-06-26 | End: 2020-02-04

## 2019-07-09 ENCOUNTER — CONSULT (OUTPATIENT)
Dept: GYNECOLOGIC ONCOLOGY | Facility: CLINIC | Age: 34
End: 2019-07-09
Payer: COMMERCIAL

## 2019-07-09 VITALS
BODY MASS INDEX: 35.97 KG/M2 | DIASTOLIC BLOOD PRESSURE: 72 MMHG | WEIGHT: 203 LBS | HEIGHT: 63 IN | HEART RATE: 88 BPM | RESPIRATION RATE: 16 BRPM | SYSTOLIC BLOOD PRESSURE: 122 MMHG

## 2019-07-09 DIAGNOSIS — Z13.79 GENETIC TESTING: Primary | ICD-10-CM

## 2019-07-09 DIAGNOSIS — Z80.1 FAMILY HISTORY OF LUNG CANCER: ICD-10-CM

## 2019-07-09 PROCEDURE — 99203 OFFICE O/P NEW LOW 30 MIN: CPT | Performed by: NURSE PRACTITIONER

## 2019-07-09 NOTE — ASSESSMENT & PLAN NOTE
63-year-old referred by her PCP for genetic testing for family history of lung cancer  She has been a PPD smoker for approximately 20 years  Her paternal grandfather, mother, and father all passed away from lung cancer  All of these relatives were smokers  Prior to our visit, I spoke with Donald Collier at [facility] regarding familial lung cancer testing  Currently, there are no guidelines for genetic testing for lung cancer, as hereditary predisposition to lung cancer is rare  The patient was made aware of this  We plan to test EGFR, FTK11, DICER1, TP53, BRCA2, and NBN, per her recommendations  She understands the potential for OOP cost for testing given that she does not meet guidelines  We discussed risk and benefits of genetic testing  She understands the possible outcomes of testing, including no pathogenic mutation, a positive pathogenic mutation, and variant of undetermined signficance (VUS)  We discussed surveillance and prophylactic measures in the event of a positive finding  We discussed implications for the patient's first degree family members if a pathogenic mutation is identified  The patient verbalizes understanding and agrees to proceed with testing  A blood sample was collected and will be sent to [facility] for processing  She understands that she will be contacted by the company in the event of any OOP co-payment  She has elected to have her results disclosed over the phone when available in approximately 2-4 weeks  In the event of a positive finding, the patient will be referred for formal genetic counseling, and any other appropriate referrals will also be made      30 minutes were spent in the care of this patient  Greater than 50% of the visit was spent in counseling and discussion of risks, benefits, and outcomes of genetic testing

## 2019-07-09 NOTE — PROGRESS NOTES
Vivian A Day  1985  [facility]  [facility] [address] [place] [address]    Chief Complaint   Patient presents with    Consult     genetic testing       Assessment/Plan     Assessment:  1  Genetic testing     2  Family history of lung cancer  Ambulatory Referral to Oncology Genetics     Cancer Staging  No matching staging information was found for the patient  Plan: 43-year-old referred by her PCP for genetic testing for family history of lung cancer  She has been a PPD smoker for approximately 20 years  Her paternal grandfather, mother, and father all passed away from lung cancer  All of these relatives were smokers  Prior to our visit, I spoke with Nate Vargas at [facility] regarding familial lung cancer testing  Currently, there are no guidelines for genetic testing for lung cancer, as hereditary predisposition to lung cancer is rare  The patient was made aware of this  We plan to test EGFR, FTK11, DICER1, TP53, BRCA2, and NBN, per her recommendations  She understands the potential for OOP cost for testing given that she does not meet guidelines  We discussed risk and benefits of genetic testing  She understands the possible outcomes of testing, including no pathogenic mutation, a positive pathogenic mutation, and variant of undetermined signficance (VUS)  We discussed surveillance and prophylactic measures in the event of a positive finding  We discussed implications for the patient's first degree family members if a pathogenic mutation is identified  The patient verbalizes understanding and agrees to proceed with testing  A blood sample was collected and will be sent to [facility] for processing  She understands that she will be contacted by the company in the event of any OOP co-payment  She has elected to have her results disclosed over the phone when available in approximately 2-4 weeks   In the event of a positive finding, the patient will be referred for formal genetic counseling, and any other appropriate referrals will also be made      30 minutes were spent in the care of this patient  Greater than 50% of the visit was spent in counseling and discussion of risks, benefits, and outcomes of genetic testing  History of Present Illness: 29-year-old with a family history of lung cancer  She is a current, every-day smoker  She is clinically well and without acute complaints today  No history exists  Review of Systems   Constitutional: Negative for chills, fatigue, fever and unexpected weight change  HENT: Negative for nosebleeds  Eyes: Negative  Respiratory: Negative for cough, chest tightness, shortness of breath and wheezing  Cardiovascular: Negative for chest pain, palpitations and leg swelling  Gastrointestinal: Negative for abdominal distention, abdominal pain, anal bleeding, blood in stool, constipation, diarrhea, nausea, rectal pain and vomiting  Endocrine: Negative  Genitourinary: Negative for difficulty urinating, dysuria, frequency, hematuria, pelvic pain, urgency, vaginal bleeding, vaginal discharge and vaginal pain  Musculoskeletal: Negative for arthralgias and joint swelling  Skin: Negative for color change, pallor and rash  Neurological: Negative for dizziness, weakness, light-headedness, numbness and headaches  Hematological: Negative  Psychiatric/Behavioral: Negative          Past Medical History:   Diagnosis Date    Anxiety     Bipolar 1 disorder ([address] )     Depression     IBS (irritable bowel syndrome)     Insomnia     Migraine     PTSD (post-traumatic stress disorder)        Past Surgical History:   Procedure Laterality Date    ANKLE HARDWARE REMOVAL      ANKLE SURGERY      CHOLECYSTECTOMY      FOOT SURGERY      MOUTH SURGERY  2011    metal placed from broken jaw    ORIF MANDIBULAR FRACTURE Bilateral 6/3/2019    Procedure: MANDIBLE CARROLL REMOVAL AND HARWARE REMOVAL FROM LEFT MANDIBLE ALVEOLAR RIDGE;  Surgeon: Devon Bustillo DMD;  [facility];  Service: Maxillofacial    OTHER SURGICAL HISTORY      unlisted craniofacial and maxillofacial procedure       OB History    None         Family History   Problem Relation Age of Onset    Lung cancer Mother     Brain cancer Mother     Bone cancer Mother     Lung cancer Father     Bone cancer Father     Liver cancer Maternal Uncle     Lung cancer Paternal Uncle     Lung cancer Paternal Grandfather        Social History     Socioeconomic History    Marital status: Single     Spouse name: Not on file    Number of children: Not on file    Years of education: Not on file    Highest education level: Not on file   Occupational History    Not on file   Social Needs    Financial resource strain: Not on file    Food insecurity:     Worry: Not on file     Inability: Not on file    Transportation needs:     Medical: Not on file     Non-medical: Not on file   Tobacco Use    Smoking status: Current Every Day Smoker     Packs/day: 0 50    Smokeless tobacco: Never Used   Substance and Sexual Activity    Alcohol use: Yes     Frequency: Monthly or less     Drinks per session: 1 or 2     Binge frequency: Never    Drug use: No     Comment: Denied history of drug use    Sexual activity: Not on file   Lifestyle    Physical activity:     Days per week: Not on file     Minutes per session: Not on file    Stress: Not on file   Relationships    Social connections:     Talks on phone: Not on file     Gets together: Not on file     Attends [employer]ism service: Not on file     Active member of club or organization: Not on file     Attends meetings of clubs or organizations: Not on file     Relationship status: Not on file    Intimate partner violence:     Fear of current or ex partner: Not on file     Emotionally abused: Not on file     Physically abused: Not on file     Forced sexual activity: Not on file   Other Topics Concern    Not on file   Social History Narrative    Not on file         Current Outpatient Medications:     Acetaminophen (TYLENOL) 325 MG CAPS, Take 2 capsules by mouth, Disp: , Rfl:     albuterol (VENTOLIN HFA) 90 mcg/act inhaler, Inhale 2 puffs every 6 (six) hours as needed for wheezing, Disp: 1 Inhaler, Rfl: 0    buPROPion (WELLBUTRIN SR) 150 mg 12 hr tablet, Take 1 tablet once a day for the 1st 3 days, then 1 tablet twice a day , Disp: 180 tablet, Rfl: 1    clonazePAM (KlonoPIN) 1 mg tablet, Take 1 tablet (1 mg total) by mouth 2 (two) times a day, Disp: 20 tablet, Rfl: 0    propranolol (INDERAL) 10 mg tablet, Take 10 mg by mouth 3 (three) times a day as needed  , Disp: , Rfl:     Allergies   Allergen Reactions    Codeine GI Intolerance    Fentanyl      Flushing    Penicillins Headache, Other (See Comments) and Vomiting     Severe vomitting      Naproxen Rash    Wellbutrin [Bupropion] Rash       Physical Exam   Constitutional: She is oriented to person, place, and time  She appears well-developed and well-nourished  No distress  HENT:   Head: Normocephalic and atraumatic  Pulmonary/Chest: Effort normal  No respiratory distress  Musculoskeletal: Normal range of motion  Neurological: She is alert and oriented to person, place, and time  Skin: Skin is warm and dry  No rash noted  She is not diaphoretic  No erythema  No pallor  Psychiatric: She has a normal mood and affect   Her behavior is normal  Judgment and thought content normal

## 2019-07-18 ENCOUNTER — TELEPHONE (OUTPATIENT)
Dept: GYNECOLOGIC ONCOLOGY | Facility: CLINIC | Age: 34
End: 2019-07-18

## 2019-10-21 ENCOUNTER — APPOINTMENT (OUTPATIENT)
Dept: LAB | Facility: HOSPITAL | Age: 34
End: 2019-10-21
Payer: COMMERCIAL

## 2019-10-21 ENCOUNTER — TRANSCRIBE ORDERS (OUTPATIENT)
Dept: ADMINISTRATIVE | Facility: HOSPITAL | Age: 34
End: 2019-10-21

## 2019-10-21 DIAGNOSIS — Z79.899 ENCOUNTER FOR LONG-TERM (CURRENT) USE OF OTHER MEDICATIONS: ICD-10-CM

## 2019-10-21 DIAGNOSIS — F90.1 HYPERKINETIC CONDUCT DISEASE: ICD-10-CM

## 2019-10-21 DIAGNOSIS — E78.5 HYPERLIPIDEMIA, UNSPECIFIED HYPERLIPIDEMIA TYPE: ICD-10-CM

## 2019-10-21 DIAGNOSIS — E55.9 VITAMIN D DEFICIENCY, UNSPECIFIED: ICD-10-CM

## 2019-10-21 DIAGNOSIS — Z13.9 SCREENING FOR UNSPECIFIED CONDITION: ICD-10-CM

## 2019-10-21 DIAGNOSIS — Z79.899 ENCOUNTER FOR LONG-TERM (CURRENT) USE OF OTHER MEDICATIONS: Primary | ICD-10-CM

## 2019-10-21 LAB
ALBUMIN SERPL BCP-MCNC: 3.9 G/DL (ref 3.5–5)
ALP SERPL-CCNC: 92 U/L (ref 46–116)
ALT SERPL W P-5'-P-CCNC: 22 U/L (ref 12–78)
ANION GAP SERPL CALCULATED.3IONS-SCNC: 11 MMOL/L (ref 4–13)
AST SERPL W P-5'-P-CCNC: 35 U/L (ref 5–45)
BASOPHILS # BLD AUTO: 0.05 THOUSANDS/ΜL (ref 0–0.1)
BASOPHILS NFR BLD AUTO: 1 % (ref 0–1)
BILIRUB SERPL-MCNC: 0.27 MG/DL (ref 0.2–1)
BUN SERPL-MCNC: 6 MG/DL (ref 5–25)
CALCIUM SERPL-MCNC: 8.9 MG/DL (ref 8.3–10.1)
CHLORIDE SERPL-SCNC: 103 MMOL/L (ref 100–108)
CHOLEST SERPL-MCNC: 193 MG/DL (ref 50–200)
CO2 SERPL-SCNC: 25 MMOL/L (ref 21–32)
CREAT SERPL-MCNC: 0.7 MG/DL (ref 0.6–1.3)
EOSINOPHIL # BLD AUTO: 0.12 THOUSAND/ΜL (ref 0–0.61)
EOSINOPHIL NFR BLD AUTO: 2 % (ref 0–6)
ERYTHROCYTE [DISTWIDTH] IN BLOOD BY AUTOMATED COUNT: 13.6 % (ref 11.6–15.1)
EST. AVERAGE GLUCOSE BLD GHB EST-MCNC: 100 MG/DL
GFR SERPL CREATININE-BSD FRML MDRD: 113 ML/MIN/1.73SQ M
GLUCOSE SERPL-MCNC: 103 MG/DL (ref 65–140)
HBA1C MFR BLD: 5.1 % (ref 4.2–6.3)
HCT VFR BLD AUTO: 41.5 % (ref 34.8–46.1)
HDLC SERPL-MCNC: 40 MG/DL
HGB BLD-MCNC: 13.5 G/DL (ref 11.5–15.4)
IMM GRANULOCYTES # BLD AUTO: 0.02 THOUSAND/UL (ref 0–0.2)
IMM GRANULOCYTES NFR BLD AUTO: 0 % (ref 0–2)
LDLC SERPL CALC-MCNC: 135 MG/DL (ref 0–100)
LYMPHOCYTES # BLD AUTO: 1.92 THOUSANDS/ΜL (ref 0.6–4.47)
LYMPHOCYTES NFR BLD AUTO: 27 % (ref 14–44)
MCH RBC QN AUTO: 32.2 PG (ref 26.8–34.3)
MCHC RBC AUTO-ENTMCNC: 32.5 G/DL (ref 31.4–37.4)
MCV RBC AUTO: 99 FL (ref 82–98)
MONOCYTES # BLD AUTO: 0.56 THOUSAND/ΜL (ref 0.17–1.22)
MONOCYTES NFR BLD AUTO: 8 % (ref 4–12)
NEUTROPHILS # BLD AUTO: 4.35 THOUSANDS/ΜL (ref 1.85–7.62)
NEUTS SEG NFR BLD AUTO: 62 % (ref 43–75)
NONHDLC SERPL-MCNC: 153 MG/DL
NRBC BLD AUTO-RTO: 0 /100 WBCS
PLATELET # BLD AUTO: 227 THOUSANDS/UL (ref 149–390)
PMV BLD AUTO: 11 FL (ref 8.9–12.7)
POTASSIUM SERPL-SCNC: 4 MMOL/L (ref 3.5–5.3)
PROT SERPL-MCNC: 7.1 G/DL (ref 6.4–8.2)
RBC # BLD AUTO: 4.19 MILLION/UL (ref 3.81–5.12)
SODIUM SERPL-SCNC: 139 MMOL/L (ref 136–145)
T4 FREE SERPL-MCNC: 0.78 NG/DL (ref 0.76–1.46)
TRIGL SERPL-MCNC: 91 MG/DL
TSH SERPL DL<=0.05 MIU/L-ACNC: 0.78 UIU/ML (ref 0.36–3.74)
VIT B12 SERPL-MCNC: 148 PG/ML (ref 100–900)
WBC # BLD AUTO: 7.02 THOUSAND/UL (ref 4.31–10.16)

## 2019-10-21 PROCEDURE — 80053 COMPREHEN METABOLIC PANEL: CPT

## 2019-10-21 PROCEDURE — 84439 ASSAY OF FREE THYROXINE: CPT

## 2019-10-21 PROCEDURE — 82652 VIT D 1 25-DIHYDROXY: CPT

## 2019-10-21 PROCEDURE — 80061 LIPID PANEL: CPT

## 2019-10-21 PROCEDURE — 84443 ASSAY THYROID STIM HORMONE: CPT

## 2019-10-21 PROCEDURE — 85025 COMPLETE CBC W/AUTO DIFF WBC: CPT

## 2019-10-21 PROCEDURE — 82607 VITAMIN B-12: CPT

## 2019-10-21 PROCEDURE — 36415 COLL VENOUS BLD VENIPUNCTURE: CPT

## 2019-10-21 PROCEDURE — 83036 HEMOGLOBIN GLYCOSYLATED A1C: CPT

## 2019-10-23 LAB — 1,25(OH)2D3 SERPL-MCNC: 52.7 PG/ML (ref 19.9–79.3)

## 2020-02-04 ENCOUNTER — HOSPITAL ENCOUNTER (EMERGENCY)
Facility: HOSPITAL | Age: 35
Discharge: HOME | End: 2020-02-04
Attending: EMERGENCY MEDICINE | Admitting: EMERGENCY MEDICINE
Payer: COMMERCIAL

## 2020-02-04 VITALS
HEART RATE: 72 BPM | SYSTOLIC BLOOD PRESSURE: 116 MMHG | DIASTOLIC BLOOD PRESSURE: 61 MMHG | RESPIRATION RATE: 16 BRPM | OXYGEN SATURATION: 100 % | TEMPERATURE: 98.2 F

## 2020-02-04 DIAGNOSIS — H11.31 SUBCONJUNCTIVAL HEMORRHAGE, RIGHT: ICD-10-CM

## 2020-02-04 DIAGNOSIS — G43.909 MIGRAINE: Primary | ICD-10-CM

## 2020-02-04 LAB
ANION GAP SERPL CALCULATED.3IONS-SCNC: 8 MMOL/L (ref 4–13)
BASOPHILS # BLD AUTO: 0.04 THOUSANDS/ΜL (ref 0–0.1)
BASOPHILS NFR BLD AUTO: 1 % (ref 0–1)
BUN SERPL-MCNC: 7 MG/DL (ref 5–25)
CALCIUM SERPL-MCNC: 8 MG/DL (ref 8.3–10.1)
CHLORIDE SERPL-SCNC: 110 MMOL/L (ref 100–108)
CO2 SERPL-SCNC: 23 MMOL/L (ref 21–32)
CREAT SERPL-MCNC: 0.59 MG/DL (ref 0.6–1.3)
EOSINOPHIL # BLD AUTO: 0.17 THOUSAND/ΜL (ref 0–0.61)
EOSINOPHIL NFR BLD AUTO: 2 % (ref 0–6)
ERYTHROCYTE [DISTWIDTH] IN BLOOD BY AUTOMATED COUNT: 14.2 % (ref 11.6–15.1)
GFR SERPL CREATININE-BSD FRML MDRD: 120 ML/MIN/1.73SQ M
GLUCOSE SERPL-MCNC: 86 MG/DL (ref 65–140)
HCT VFR BLD AUTO: 39.9 % (ref 34.8–46.1)
HGB BLD-MCNC: 13 G/DL (ref 11.5–15.4)
IMM GRANULOCYTES # BLD AUTO: 0.02 THOUSAND/UL (ref 0–0.2)
IMM GRANULOCYTES NFR BLD AUTO: 0 % (ref 0–2)
LYMPHOCYTES # BLD AUTO: 2.33 THOUSANDS/ΜL (ref 0.6–4.47)
LYMPHOCYTES NFR BLD AUTO: 27 % (ref 14–44)
MCH RBC QN AUTO: 31 PG (ref 26.8–34.3)
MCHC RBC AUTO-ENTMCNC: 32.6 G/DL (ref 31.4–37.4)
MCV RBC AUTO: 95 FL (ref 82–98)
MONOCYTES # BLD AUTO: 0.68 THOUSAND/ΜL (ref 0.17–1.22)
MONOCYTES NFR BLD AUTO: 8 % (ref 4–12)
NEUTROPHILS # BLD AUTO: 5.29 THOUSANDS/ΜL (ref 1.85–7.62)
NEUTS SEG NFR BLD AUTO: 62 % (ref 43–75)
NRBC BLD AUTO-RTO: 0 /100 WBCS
PLATELET # BLD AUTO: 172 THOUSANDS/UL (ref 149–390)
PMV BLD AUTO: 11.4 FL (ref 8.9–12.7)
POTASSIUM SERPL-SCNC: 3.9 MMOL/L (ref 3.5–5.3)
RBC # BLD AUTO: 4.19 MILLION/UL (ref 3.81–5.12)
SODIUM SERPL-SCNC: 141 MMOL/L (ref 136–145)
WBC # BLD AUTO: 8.53 THOUSAND/UL (ref 4.31–10.16)

## 2020-02-04 PROCEDURE — 96361 HYDRATE IV INFUSION ADD-ON: CPT

## 2020-02-04 PROCEDURE — 80048 BASIC METABOLIC PNL TOTAL CA: CPT | Performed by: PHYSICIAN ASSISTANT

## 2020-02-04 PROCEDURE — 96375 TX/PRO/DX INJ NEW DRUG ADDON: CPT

## 2020-02-04 PROCEDURE — 96374 THER/PROPH/DIAG INJ IV PUSH: CPT

## 2020-02-04 PROCEDURE — 99284 EMERGENCY DEPT VISIT MOD MDM: CPT | Performed by: EMERGENCY MEDICINE

## 2020-02-04 PROCEDURE — 85025 COMPLETE CBC W/AUTO DIFF WBC: CPT | Performed by: PHYSICIAN ASSISTANT

## 2020-02-04 PROCEDURE — 99283 EMERGENCY DEPT VISIT LOW MDM: CPT

## 2020-02-04 PROCEDURE — 36415 COLL VENOUS BLD VENIPUNCTURE: CPT | Performed by: PHYSICIAN ASSISTANT

## 2020-02-04 RX ORDER — DIPHENHYDRAMINE HYDROCHLORIDE 50 MG/ML
25 INJECTION INTRAMUSCULAR; INTRAVENOUS ONCE
Status: COMPLETED | OUTPATIENT
Start: 2020-02-04 | End: 2020-02-04

## 2020-02-04 RX ORDER — METOCLOPRAMIDE HYDROCHLORIDE 5 MG/ML
10 INJECTION INTRAMUSCULAR; INTRAVENOUS ONCE
Status: COMPLETED | OUTPATIENT
Start: 2020-02-04 | End: 2020-02-04

## 2020-02-04 RX ORDER — HYDROXYZINE HYDROCHLORIDE 25 MG/1
25 TABLET, FILM COATED ORAL EVERY 6 HOURS PRN
COMMUNITY

## 2020-02-04 RX ORDER — KETOROLAC TROMETHAMINE 30 MG/ML
15 INJECTION, SOLUTION INTRAMUSCULAR; INTRAVENOUS ONCE
Status: COMPLETED | OUTPATIENT
Start: 2020-02-04 | End: 2020-02-04

## 2020-02-04 RX ORDER — BUTALBITAL, ACETAMINOPHEN AND CAFFEINE 50; 325; 40 MG/1; MG/1; MG/1
1 TABLET ORAL EVERY 6 HOURS PRN
Qty: 10 TABLET | Refills: 0 | Status: SHIPPED | OUTPATIENT
Start: 2020-02-04

## 2020-02-04 RX ORDER — ONDANSETRON 2 MG/ML
4 INJECTION INTRAMUSCULAR; INTRAVENOUS ONCE
Status: DISCONTINUED | OUTPATIENT
Start: 2020-02-04 | End: 2020-02-04

## 2020-02-04 RX ADMIN — METOCLOPRAMIDE 10 MG: 5 INJECTION, SOLUTION INTRAMUSCULAR; INTRAVENOUS at 13:15

## 2020-02-04 RX ADMIN — KETOROLAC TROMETHAMINE 15 MG: 30 INJECTION, SOLUTION INTRAMUSCULAR at 13:21

## 2020-02-04 RX ADMIN — DIPHENHYDRAMINE HYDROCHLORIDE 25 MG: 50 INJECTION, SOLUTION INTRAMUSCULAR; INTRAVENOUS at 13:14

## 2020-02-04 RX ADMIN — SODIUM CHLORIDE 1000 ML: 0.9 INJECTION, SOLUTION INTRAVENOUS at 13:11

## 2020-02-04 NOTE — ED PROVIDER NOTES
History  Chief Complaint   Patient presents with    Headache     pt c/o headache starting last night, hx of migraines, photosensativity and nausea   Eye Redness     pt reports redness to right eye describes pain as "someone scratched it" pt denies injury to eye, reports yellowish drainage  Bessyady Perez is a 30 yo F, with past medical history of migraine headache, presenting with generalized headache, worse on the R side for one day  Patient also reports associated photophobia and nausea/vomiting  Patient states the symptoms feel similar to prior episodes of migraine  Denies recent head injury or trauma  Denies neck pain or stiffness  No fevers or chills  No numbness, tingling, or weakness  Denies blurry or double vision  Patient does report redness to right eye which has been ongoing for several days  Denies injury trauma right eye  Denies pain  Patient has taken Tylenol Motrin at home as needed for headache without significant relief  Patient's last dose of ibuprofen early this morning, last dose of Tylenol at 1100  History provided by:  Patient   used: No    Headache   Pain location:  Generalized  Quality:  Dull  Onset quality:  Gradual  Duration:  1 day  Timing:  Constant  Progression:  Unchanged  Chronicity:  Recurrent  Similar to prior headaches: yes    Relieved by:  Nothing  Worsened by:  Nothing  Ineffective treatments:  NSAIDs and acetaminophen  Associated symptoms: nausea, photophobia and vomiting    Associated symptoms: no abdominal pain, no blurred vision, no congestion, no cough, no diarrhea, no dizziness, no ear pain, no eye pain, no fever, no myalgias, no neck pain, no neck stiffness, no numbness, no paresthesias, no sinus pressure, no sore throat, no tingling, no visual change and no weakness        Prior to Admission Medications   Prescriptions Last Dose Informant Patient Reported? Taking?    Acetaminophen (TYLENOL) 325 MG CAPS   Yes Yes   Sig: Take 2 capsules by mouth   CLONIDINE HCL PO   Yes Yes   Sig: Take by mouth daily at bedtime as needed   Sertraline HCl (ZOLOFT PO)   Yes Yes   Sig: Take by mouth 2 (two) times a day   Ziprasidone HCl (GEODON PO)   Yes Yes   Sig: Take by mouth 2 (two) times a day   albuterol (VENTOLIN HFA) 90 mcg/act inhaler   No Yes   Sig: Inhale 2 puffs every 6 (six) hours as needed for wheezing   clonazePAM (KlonoPIN) 1 mg tablet   No Yes   Sig: Take 1 tablet (1 mg total) by mouth 2 (two) times a day   hydrOXYzine HCL (ATARAX) 25 mg tablet   Yes Yes   Sig: Take 25 mg by mouth every 6 (six) hours as needed for itching   propranolol (INDERAL) 10 mg tablet   Yes Yes   Sig: Take 10 mg by mouth 3 (three) times a day as needed        Facility-Administered Medications: None       Past Medical History:   Diagnosis Date    Anxiety     Bipolar 1 disorder (H[facility])     Depression     IBS (irritable bowel syndrome)     Insomnia     Migraine     PTSD (post-traumatic stress disorder)        Past Surgical History:   Procedure Laterality Date    ANKLE HARDWARE REMOVAL      ANKLE SURGERY      CHOLECYSTECTOMY      FOOT SURGERY      MOUTH SURGERY  2011    metal placed from broken jaw    ORIF MANDIBULAR FRACTURE Bilateral 6/3/2019    Procedure: MANDIBLE CARROLL REMOVAL AND HARWARE REMOVAL FROM LEFT MANDIBLE ALVEOLAR RIDGE;  Surgeon: Diane Mcdaniel DMD;  [facility];  Service: Maxillofacial    OTHER SURGICAL HISTORY      unlisted craniofacial and maxillofacial procedure       Family History   Problem Relation Age of Onset    Lung cancer Mother     Brain cancer Mother     Bone cancer Mother     Lung cancer Father     Bone cancer Father     Liver cancer Maternal Uncle     Lung cancer Paternal Uncle     Lung cancer Paternal Grandfather      I have reviewed and agree with the history as documented      Social History     Tobacco Use    Smoking status: Current Every Day Smoker     Packs/day: 0 50    Smokeless tobacco: Never Used   Substance Use Topics    Alcohol use: Yes     Frequency: Monthly or less     Drinks per session: 1 or 2     Binge frequency: Never     Comment: social    Drug use: No     Comment: Denied history of drug use        Review of Systems   Constitutional: Negative for chills and fever  HENT: Negative for congestion, ear discharge, ear pain, mouth sores, rhinorrhea, sinus pressure, sinus pain, sore throat and voice change  Eyes: Positive for photophobia and redness  Negative for blurred vision, pain, discharge, itching and visual disturbance  Respiratory: Negative for cough, chest tightness, shortness of breath and wheezing  Cardiovascular: Negative for chest pain and palpitations  Gastrointestinal: Positive for nausea and vomiting  Negative for abdominal pain, constipation and diarrhea  Genitourinary: Negative for dysuria, frequency and urgency  Musculoskeletal: Negative for myalgias, neck pain and neck stiffness  Skin: Negative for pallor, rash and wound  Neurological: Positive for headaches  Negative for dizziness, weakness, light-headedness, numbness and paresthesias  Physical Exam  Physical Exam   Constitutional: She is oriented to person, place, and time  She appears well-developed and well-nourished  No distress  HENT:   Head: Normocephalic and atraumatic  Right Ear: Tympanic membrane, external ear and ear canal normal    Left Ear: Tympanic membrane, external ear and ear canal normal    Nose: Nose normal    Mouth/Throat: Oropharynx is clear and moist  No oropharyngeal exudate  Eyes: Pupils are equal, round, and reactive to light  EOM and lids are normal  Right eye exhibits no discharge and no exudate  Left eye exhibits no discharge and no exudate  Right conjunctiva has a hemorrhage  Right eye exhibits normal extraocular motion  Left eye exhibits normal extraocular motion  Neck: Normal range of motion  Neck supple  Cardiovascular: Normal rate, regular rhythm and normal heart sounds   Exam reveals no gallop and no friction rub  No murmur heard  Pulmonary/Chest: Effort normal and breath sounds normal  No stridor  No respiratory distress  She has no wheezes  She has no rales  Abdominal: Soft  Bowel sounds are normal  She exhibits no distension  There is no tenderness  There is no guarding  Lymphadenopathy:     She has no cervical adenopathy  Neurological: She is alert and oriented to person, place, and time  She has normal strength  She displays normal reflexes  No cranial nerve deficit or sensory deficit  She exhibits normal muscle tone  Coordination normal  GCS eye subscore is 4  GCS verbal subscore is 5  GCS motor subscore is 6  Skin: Skin is warm and dry  Capillary refill takes less than 2 seconds  No rash noted  No erythema  No pallor  Psychiatric: She has a normal mood and affect  Her behavior is normal  Judgment and thought content normal    Nursing note and vitals reviewed        Vital Signs  ED Triage Vitals   Temperature Pulse Respirations Blood Pressure SpO2   02/04/20 1215 02/04/20 1215 02/04/20 1215 02/04/20 1215 02/04/20 1215   98 2 °F (36 8 °C) 86 18 130/70 96 %      Temp Source Heart Rate Source Patient Position - Orthostatic VS BP Location FiO2 (%)   02/04/20 1215 02/04/20 1215 02/04/20 1215 02/04/20 1215 --   Oral Monitor Sitting Right arm       Pain Score       02/04/20 1321       6           Vitals:    02/04/20 1215 02/04/20 1323   BP: 130/70 116/61   Pulse: 86 72   Patient Position - Orthostatic VS: Sitting          Visual Acuity      ED Medications  Medications   metoclopramide (REGLAN) injection 10 mg (10 mg Intravenous Given 2/4/20 1315)   diphenhydrAMINE (BENADRYL) injection 25 mg (25 mg Intravenous Given 2/4/20 1314)   ketorolac (TORADOL) injection 15 mg (15 mg Intravenous Given 2/4/20 1321)   sodium chloride 0 9 % bolus 1,000 mL (0 mL Intravenous Stopped 2/4/20 1430)       Diagnostic Studies  Results Reviewed     Procedure Component Value Units Date/Time    Basic metabolic panel [[id number]]  (Abnormal) Collected:  02/04/20 1311    Lab Status:  Final result Specimen:  Blood from Arm, Left Updated:  02/04/20 1352     Sodium 141 mmol/L      Potassium 3 9 mmol/L      Chloride 110 mmol/L      CO2 23 mmol/L      ANION GAP 8 mmol/L      BUN 7 mg/dL      Creatinine 0 59 mg/dL      Glucose 86 mg/dL      Calcium 8 0 mg/dL      eGFR 120 ml/min/1 73sq m     Narrative:       [place] guidelines for Chronic Kidney Disease (CKD):     Stage 1 with normal or high GFR (GFR > 90 mL/min/1 73 square meters)    Stage 2 Mild CKD (GFR = 60-89 mL/min/1 73 square meters)    Stage 3A Moderate CKD (GFR = 45-59 mL/min/1 73 square meters)    Stage 3B Moderate CKD (GFR = 30-44 mL/min/1 73 square meters)    Stage 4 Severe CKD (GFR = 15-29 mL/min/1 73 square meters)    Stage 5 End Stage CKD (GFR <15 mL/min/1 73 square meters)  Note: GFR calculation is accurate only with a steady state creatinine    CBC and differential [[id number]] Collected:  02/04/20 1311    Lab Status:  Final result Specimen:  Blood from Arm, Left Updated:  02/04/20 1329     WBC 8 53 Thousand/uL      RBC 4 19 Million/uL      Hemoglobin 13 0 g/dL      Hematocrit 39 9 %      MCV 95 fL      MCH 31 0 pg      MCHC 32 6 g/dL      RDW 14 2 %      MPV 11 4 fL      Platelets 778 Thousands/uL      nRBC 0 /100 WBCs      Neutrophils Relative 62 %      Immat GRANS % 0 %      Lymphocytes Relative 27 %      Monocytes Relative 8 %      Eosinophils Relative 2 %      Basophils Relative 1 %      Neutrophils Absolute 5 29 Thousands/µL      Immature Grans Absolute 0 02 Thousand/uL      Lymphocytes Absolute 2 33 Thousands/µL      Monocytes Absolute 0 68 Thousand/µL      Eosinophils Absolute 0 17 Thousand/µL      Basophils Absolute 0 04 Thousands/µL                  No orders to display              Procedures  Procedures         ED Course  ED Course as of Feb 04 1503   Tue Feb 04, 2020   1404 Pt reports resolution of headache, denies nausea at this time  MDM  Number of Diagnoses or Management Options  Migraine:   Subconjunctival hemorrhage, right:   Diagnosis management comments: Generalized headache for 1 day, worse on the right side  Associated photophobia and nausea/vomiting  Headache is similar to prior episodes of migraine  Neurologically intact on examination  Will check basic labs, provide migraine cocktail, IV fluids, reassess  Patient reports complete resolution of headache following ED therapy  Basic labs are grossly unremarkable with exception of mild hypocalcemia and hyperchloremia  Will discharge with short course of Fioricet as needed, advised follow up with primary care physician  Amount and/or Complexity of Data Reviewed  Clinical lab tests: ordered and reviewed    Patient Progress  Patient progress: resolved        Disposition  Final diagnoses:   Migraine   Subconjunctival hemorrhage, right     Time reflects when diagnosis was documented in both MDM as applicable and the Disposition within this note     Time User Action Codes Description Comment    2/4/2020  2:22 PM Bradd Saver Add [G43 909] Migraine     2/4/2020  2:22 PM Bradd Saver Add [H11 31] Subconjunctival hemorrhage, right       ED Disposition     ED Disposition Condition Date/Time Comment    Discharge Stable Tue Feb 4, 2020  2:22 PM Vivian A Day discharge to home/self care              Follow-up Information     Follow up With Specialties Details Why Contact Info    Quincy Salmon PA-C Family Medicine, Physician Assistant Schedule an appointment as soon as possible for a visit   [address]  [address] [facility] [address]  [phone]            Discharge Medication List as of 2/4/2020  2:24 PM      START taking these medications    Details   butalbital-acetaminophen-caffeine (FIORICET,ESGIC) -40 mg per tablet Take 1 tablet by mouth every 6 (six) hours as needed for headaches or migraine, Starting Tue 2/4/2020, Normal         CONTINUE these medications which have NOT CHANGED    Details   Acetaminophen (TYLENOL) 325 MG CAPS Take 2 capsules by mouth, Historical Med      albuterol (VENTOLIN HFA) 90 mcg/act inhaler Inhale 2 puffs every 6 (six) hours as needed for wheezing, Starting Wed 6/26/2019, Normal      clonazePAM (KlonoPIN) 1 mg tablet Take 1 tablet (1 mg total) by mouth 2 (two) times a day, Starting Wed 6/26/2019, Normal      CLONIDINE HCL PO Take by mouth daily at bedtime as needed, Historical Med      hydrOXYzine HCL (ATARAX) 25 mg tablet Take 25 mg by mouth every 6 (six) hours as needed for itching, Historical Med      propranolol (INDERAL) 10 mg tablet Take 10 mg by mouth 3 (three) times a day as needed  , Until Discontinued, Historical Med      Sertraline HCl (ZOLOFT PO) Take by mouth 2 (two) times a day, Historical Med      Ziprasidone HCl (GEODON PO) Take by mouth 2 (two) times a day, Historical Med           No discharge procedures on file      ED Provider  Electronically Signed by           Deann Sorto PA-C  02/04/20 [id number]

## 2020-02-05 NOTE — SOCIAL WORK
Received phone call from this patient requesting prior authorization prescribed by ED attending last night, Fioricet  Patient's pharmacy was contact and informed is control substance needing prior authorization (Jackie Long)  This musty be completed by attending, patient has an appointment for next Wednesday at [address]  This worker contact mentioned facility and given an a follow appointment for tomorrow at 1045 am   Patient was informed  No other issues reported by patient

## 2020-02-06 ENCOUNTER — TELEPHONE (OUTPATIENT)
Dept: OTHER | Facility: OTHER | Age: 35
End: 2020-02-06

## 2020-02-11 RX ORDER — CLONAZEPAM 0.5 MG/1
TABLET ORAL
COMMUNITY
Start: 2020-02-03

## 2020-02-11 RX ORDER — HYDROXYZINE PAMOATE 25 MG/1
CAPSULE ORAL
COMMUNITY
Start: 2020-02-03

## 2020-02-11 RX ORDER — CLONIDINE HYDROCHLORIDE 0.1 MG/1
TABLET ORAL
COMMUNITY
Start: 2020-01-23

## 2020-02-12 ENCOUNTER — OFFICE VISIT (OUTPATIENT)
Dept: FAMILY MEDICINE CLINIC | Facility: CLINIC | Age: 35
End: 2020-02-12

## 2020-02-12 VITALS
SYSTOLIC BLOOD PRESSURE: 120 MMHG | RESPIRATION RATE: 16 BRPM | TEMPERATURE: 96.2 F | OXYGEN SATURATION: 99 % | HEART RATE: 72 BPM | WEIGHT: 204 LBS | DIASTOLIC BLOOD PRESSURE: 80 MMHG | HEIGHT: 64 IN | BODY MASS INDEX: 34.83 KG/M2

## 2020-02-12 DIAGNOSIS — Z23 NEED FOR VACCINATION: ICD-10-CM

## 2020-02-12 DIAGNOSIS — Z00.00 ANNUAL PHYSICAL EXAM: Primary | ICD-10-CM

## 2020-02-12 DIAGNOSIS — E66.09 CLASS 2 OBESITY DUE TO EXCESS CALORIES WITHOUT SERIOUS COMORBIDITY WITH BODY MASS INDEX (BMI) OF 35.0 TO 35.9 IN ADULT: ICD-10-CM

## 2020-02-12 PROCEDURE — 3008F BODY MASS INDEX DOCD: CPT | Performed by: PHYSICIAN ASSISTANT

## 2020-02-12 PROCEDURE — 90471 IMMUNIZATION ADMIN: CPT | Performed by: PHYSICIAN ASSISTANT

## 2020-02-12 PROCEDURE — 90686 IIV4 VACC NO PRSV 0.5 ML IM: CPT | Performed by: PHYSICIAN ASSISTANT

## 2020-02-12 PROCEDURE — 99395 PREV VISIT EST AGE 18-39: CPT | Performed by: PHYSICIAN ASSISTANT

## 2020-02-12 NOTE — LETTER
February 12, 2020     Patient: Shadi You   YOB: 1985   Date of Visit: 2/12/2020       To Whom it May Concern:    Fidel You is under my professional care  She was seen in my office on 2/12/2020  She was given the influenza immunization today  If you have any questions or concerns, please don't hesitate to call           Sincerely,          Karol De Dios PA-C        CC: No Recipients

## 2020-02-12 NOTE — PATIENT INSTRUCTIONS

## 2020-02-12 NOTE — PROGRESS NOTES
[address]    NAME: Garrett You  AGE: 29 y o  SEX: female  : 1985     DATE: 2020     Assessment and Plan:     Problem List Items Addressed This Visit        Other    Obesity      Other Visit Diagnoses     Annual physical exam    -  Primary    Need for vaccination        Relevant Orders    influenza vaccine, [id number], quadrivalent, 0 5 mL, preservative-free, for adult and pediatric patients 6 mos+ (AFLURIA, FLUARIX, FLULAVAL, FLUZONE) (Completed)          Immunizations and preventive care screenings were discussed with patient today  Appropriate education was printed on patient's after visit summary  Counseling:  Alcohol/drug use: discussed moderation in alcohol intake, the recommendations for healthy alcohol use, and avoidance of illicit drug use  Dental Health: discussed importance of regular tooth brushing, flossing, and dental visits  Injury prevention: discussed safety/seat belts, safety helmets, smoke detectors, carbon dioxide detectors, and smoking near bedding or upholstery  Sexual health: discussed sexually transmitted diseases, partner selection, use of condoms, avoidance of unintended pregnancy, and contraceptive alternatives  · Exercise: the importance of regular exercise/physical activity was discussed  Recommend exercise 3-5 times per week for at least 30 minutes  · Shoulder pain likely due to muscle strain  At this time would recommend with the heat and ice if that has been helping with pain  Demonstrated stretches that may help with the pain  Would also recommend Tylenol and ibuprofen to help with the pain  Did inform patient that it may take a couple weeks for pain to fully go away  If symptoms get worse, recommend making a follow-up appointment  BMI Counseling: Body mass index is 35 29 kg/m²   The BMI is above normal  Nutrition recommendations include decreasing portion sizes, encouraging healthy choices of fruits and vegetables, limiting drinks that contain sugar, increasing intake of lean protein, reducing intake of saturated and trans fat and reducing intake of cholesterol  Exercise recommendations include moderate physical activity 150 minutes/week and exercising 3-5 times per week  Tobacco Cessation Counseling: Tobacco cessation counseling was provided  The patient is sincerely urged to quit consumption of tobacco  She is ready to quit tobacco  Medication options and side effects of medication discussed  Patient refused medication  Has been cutting back  No follow-ups on file  Chief Complaint:     Chief Complaint   Patient presents with    Physical Exam      History of Present Illness:     Adult Annual Physical   Patient here for a comprehensive physical exam  The patient reports problems - pain around right shoulder that started yesterday  Describes it as an achy sensation  Pain is worse with abduction and external rotation  Ice and heat have been helping with pain       Diet and Physical Activity  · Diet/Nutrition: well balanced diet, limited junk food and consuming 3-5 servings of fruits/vegetables daily  · Exercise: walking, 5-7 times a week on average and 30-60 minutes on average  Depression Screening  PHQ-9 Depression Screening    PHQ-9:    Frequency of the following problems over the past two weeks:            General Health  · Sleep: sleeps well and gets 7-8 hours of sleep on average  · Hearing: normal - bilateral   · Vision: vision problems: vision has been getting worse, denies any spots or loss of vision and most recent eye exam >1 year ago  · Dental: does not have any teeth, but uses mouth wash daily  /GYN Health  · Last menstrual period: 2/7/2020  · Contraceptive method: barrier methods    · History of STDs?: no      Review of Systems:     Review of Systems   Constitutional: Negative for activity change, appetite change, chills, diaphoresis, fatigue, fever and unexpected weight change  HENT: Negative for congestion, ear pain, hearing loss, rhinorrhea and sore throat  Eyes: Negative for pain and visual disturbance  Respiratory: Negative for cough, shortness of breath and wheezing  Cardiovascular: Negative for chest pain, palpitations and leg swelling  Gastrointestinal: Negative for abdominal pain, blood in stool, constipation, diarrhea, nausea and vomiting  Endocrine: Negative for cold intolerance, heat intolerance and polyuria  Genitourinary: Negative for difficulty urinating, dysuria, frequency, hematuria and urgency  Musculoskeletal: Negative for arthralgias, joint swelling and myalgias  See HPI   Skin: Negative for rash and wound  Neurological: Negative for dizziness, syncope, weakness, numbness and headaches  Psychiatric/Behavioral: Negative for dysphoric mood and sleep disturbance  The patient is not nervous/anxious         Past Medical History:     Past Medical History:   Diagnosis Date    Anxiety     Bipolar 1 disorder ([address] )     Depression     IBS (irritable bowel syndrome)     Insomnia     Migraine     PTSD (post-traumatic stress disorder)       Past Surgical History:     Past Surgical History:   Procedure Laterality Date    ANKLE HARDWARE REMOVAL      ANKLE SURGERY      CHOLECYSTECTOMY      FOOT SURGERY      MOUTH SURGERY  2011    metal placed from broken jaw    ORIF MANDIBULAR FRACTURE Bilateral 6/3/2019    Procedure: [address] REMOVAL FROM LEFT MANDIBLE ALVEOLAR RIDGE;  Surgeon: Piotr Pérez DMD;  [facility];  Service: Maxillofacial    OTHER SURGICAL HISTORY      unlisted craniofacial and maxillofacial procedure      Social History:        Social History     Socioeconomic History    Marital status: Single     Spouse name: None    Number of children: None    Years of education: None    Highest education level: None   Occupational History    None Social Needs    Financial resource strain: None    Food insecurity:     Worry: None     Inability: None    Transportation needs:     Medical: None     Non-medical: None   Tobacco Use    Smoking status: Current Every Day Smoker     Packs/day: 0 50    Smokeless tobacco: Never Used   Substance and Sexual Activity    Alcohol use:  Yes     Alcohol/week: 2 0 standard drinks     Types: 2 Glasses of wine per week     Frequency: Monthly or less     Drinks per session: 1 or 2     Binge frequency: Never     Comment: social    Drug use: No     Comment: Denied history of drug use    Sexual activity: None   Lifestyle    Physical activity:     Days per week: None     Minutes per session: None    Stress: None   Relationships    Social connections:     Talks on phone: None     Gets together: None     Attends Congregational service: None     Active member of club or organization: None     Attends meetings of clubs or organizations: None     Relationship status: None    Intimate partner violence:     Fear of current or ex partner: None     Emotionally abused: None     Physically abused: None     Forced sexual activity: None   Other Topics Concern    None   Social History Narrative    None      Family History:     Family History   Problem Relation Age of Onset    Lung cancer Mother     Brain cancer Mother     Bone cancer Mother     Lung cancer Father     Bone cancer Father     Liver cancer Maternal Uncle     Lung cancer Paternal Uncle     Lung cancer Paternal Grandfather       Current Medications:     Current Outpatient Medications   Medication Sig Dispense Refill    Acetaminophen (TYLENOL) 325 MG CAPS Take 2 capsules by mouth      albuterol (VENTOLIN HFA) 90 mcg/act inhaler Inhale 2 puffs every 6 (six) hours as needed for wheezing 1 Inhaler 0    clonazePAM (KlonoPIN) 0 5 mg tablet       cloNIDine (CATAPRES) 0 1 mg tablet       hydrOXYzine HCL (ATARAX) 25 mg tablet Take 25 mg by mouth every 6 (six) hours as needed for itching      propranolol (INDERAL) 10 mg tablet Take 10 mg by mouth 3 (three) times a day as needed        Sertraline HCl (ZOLOFT PO) Take by mouth 2 (two) times a day      Ziprasidone HCl (GEODON PO) Take by mouth 2 (two) times a day      butalbital-acetaminophen-caffeine (FIORICET,ESGIC) -40 mg per tablet Take 1 tablet by mouth every 6 (six) hours as needed for headaches or migraine (Patient not taking: Reported on 2/12/2020) 10 tablet 0    clonazePAM (KlonoPIN) 1 mg tablet Take 1 tablet (1 mg total) by mouth 2 (two) times a day (Patient not taking: Reported on 2/12/2020) 20 tablet 0    CLONIDINE HCL PO Take by mouth daily at bedtime as needed      hydrOXYzine pamoate (VISTARIL) 25 mg capsule        No current facility-administered medications for this visit  Allergies: Allergies   Allergen Reactions    Codeine GI Intolerance    Fentanyl      Flushing    Penicillins Headache, Other (See Comments) and Vomiting     Severe vomitting      Naproxen Rash    Wellbutrin [Bupropion] Rash      Physical Exam:     /80 (BP Location: Left arm, Patient Position: Sitting, Cuff Size: Adult)   Pulse 72   Temp (!) 96 2 °F (35 7 °C)   Resp 16   Ht 5' 3 75" (1 619 m)   Wt 92 5 kg (204 lb)   LMP 02/07/2020 (Within Days)   SpO2 99%   BMI 35 29 kg/m²     Physical Exam   Constitutional: She is oriented to person, place, and time  She appears well-developed and well-nourished  No distress  HENT:   Right Ear: External ear normal    Left Ear: External ear normal    Nose: Nose normal    Mouth/Throat: Oropharynx is clear and moist    Missing all teeth   Eyes: Pupils are equal, round, and reactive to light  Conjunctivae and EOM are normal    Neck: Normal range of motion  Neck supple  Cardiovascular: Normal rate, regular rhythm and normal heart sounds  Exam reveals no gallop and no friction rub  No murmur heard    Pulmonary/Chest: Effort normal and breath sounds normal  No respiratory distress  She has no wheezes  Abdominal: Soft  Bowel sounds are normal  She exhibits no distension  There is no tenderness  There is no guarding  Musculoskeletal: Normal range of motion  She exhibits tenderness (Around right trapezius)  She exhibits no edema or deformity  Lymphadenopathy:     She has no cervical adenopathy  Neurological: She is alert and oriented to person, place, and time  She displays normal reflexes  No cranial nerve deficit  She exhibits normal muscle tone  Coordination normal    Skin: Skin is warm  She is not diaphoretic  Psychiatric: She has a normal mood and affect  Her behavior is normal  Thought content normal    Nursing note and vitals reviewed        BRANDON [address]

## 2020-03-26 ENCOUNTER — TELEPHONE (OUTPATIENT)
Dept: OTHER | Facility: OTHER | Age: 35
End: 2020-03-26

## 2020-03-26 ENCOUNTER — TELEMEDICINE (OUTPATIENT)
Dept: FAMILY MEDICINE CLINIC | Facility: CLINIC | Age: 35
End: 2020-03-26

## 2020-03-26 DIAGNOSIS — Z20.828 EXPOSURE TO SARS-ASSOCIATED CORONAVIRUS: ICD-10-CM

## 2020-03-26 DIAGNOSIS — J45.20 MILD INTERMITTENT ASTHMA WITHOUT COMPLICATION: ICD-10-CM

## 2020-03-26 DIAGNOSIS — J06.9 ACUTE UPPER RESPIRATORY INFECTION: ICD-10-CM

## 2020-03-26 DIAGNOSIS — Z20.828 EXPOSURE TO SARS-ASSOCIATED CORONAVIRUS: Primary | ICD-10-CM

## 2020-03-26 PROCEDURE — G2012 BRIEF CHECK IN BY MD/QHP: HCPCS | Performed by: PHYSICIAN ASSISTANT

## 2020-03-26 PROCEDURE — 87635 SARS-COV-2 COVID-19 AMP PRB: CPT

## 2020-03-26 RX ORDER — ALBUTEROL SULFATE 90 UG/1
2 AEROSOL, METERED RESPIRATORY (INHALATION) EVERY 6 HOURS PRN
Qty: 1 INHALER | Refills: 0 | Status: SHIPPED | OUTPATIENT
Start: 2020-03-26 | End: 2021-06-30 | Stop reason: SDUPTHER

## 2020-03-26 RX ORDER — DEXTROMETHORPHAN HYDROBROMIDE AND PROMETHAZINE HYDROCHLORIDE 15; 6.25 MG/5ML; MG/5ML
5 SOLUTION ORAL 4 TIMES DAILY PRN
Qty: 180 ML | Refills: 0 | Status: SHIPPED | OUTPATIENT
Start: 2020-03-26 | End: 2021-06-30

## 2020-03-26 NOTE — PROGRESS NOTES
COVID-19 Virtual Visit     This virtual check-in was done via telephone  Encounter provider J Carlos Richards PA-C    Provider located at [address] [place] [id number] [phone]    Recent Visits  No visits were found meeting these conditions  Showing recent visits within past 7 days and meeting all other requirements     Today's Visits  Date Type Provider Dept   03/26/20 Telemedicine J Carlos Richards PA-C  [facility]   Showing today's visits and meeting all other requirements     Future Appointments  Date Type Provider Dept   03/26/20 Telemedicine J Carlos Richards PA-C  [facility]   Showing future appointments within next 150 days and meeting all other requirements        Patient agrees to participate in a virtual check in via telephone or video visit instead of presenting to the office to address urgent/immediate medical needs  Patient is aware this is a billable service  After connecting through telephone, the patient was identified by name and date of birth  Vivian You was informed that this was a telemedicine visit and that the exam was being conducted confidentially over secure lines  Other methods to assure confidentiality were taken no non-healthcare providers in the area  No one else was in the room  Vivian You acknowledged consent and understanding of privacy and security of the telemedicine visit  I informed the patient that I have reviewed her record in Epic and presented the opportunity for her to ask any questions regarding the visit today  The patient agreed to participate  Francia Xiong is a 29 y o  female who is concerned about COVID-19  She reports cough, shortness of breath and headaches, sore throat  She has not traveled outside the [place]  within the last 14 days    She has not had contact with a person who is under investigation for or who is positive for COVID-19 within the last 14 days, but states she lives with someone who may have been in contact with someone who has it  She has not been hospitalized recently for fever and/or lower respiratory symptoms      Past Medical History:   Diagnosis Date    Anxiety     Bipolar 1 disorder ([address] )     Depression     IBS (irritable bowel syndrome)     Insomnia     Migraine     PTSD (post-traumatic stress disorder)        Past Surgical History:   Procedure Laterality Date    ANKLE HARDWARE REMOVAL      ANKLE SURGERY      CHOLECYSTECTOMY      FOOT SURGERY      MOUTH SURGERY  2011    metal placed from broken jaw    ORIF MANDIBULAR FRACTURE Bilateral 6/3/2019    Procedure: MANDIBLE CARROLL REMOVAL AND HARWARE REMOVAL FROM LEFT MANDIBLE ALVEOLAR RIDGE;  Surgeon: Magdi John DMD;  [facility];  Service: Maxillofacial    OTHER SURGICAL HISTORY      unlisted craniofacial and maxillofacial procedure       Current Outpatient Medications   Medication Sig Dispense Refill    Acetaminophen (TYLENOL) 325 MG CAPS Take 2 capsules by mouth      albuterol (VENTOLIN HFA) 90 mcg/act inhaler Inhale 2 puffs every 6 (six) hours as needed for wheezing 1 Inhaler 0    butalbital-acetaminophen-caffeine (FIORICET,ESGIC) -40 mg per tablet Take 1 tablet by mouth every 6 (six) hours as needed for headaches or migraine (Patient not taking: Reported on 2/12/2020) 10 tablet 0    clonazePAM (KlonoPIN) 0 5 mg tablet       clonazePAM (KlonoPIN) 1 mg tablet Take 1 tablet (1 mg total) by mouth 2 (two) times a day (Patient not taking: Reported on 2/12/2020) 20 tablet 0    cloNIDine (CATAPRES) 0 1 mg tablet       CLONIDINE HCL PO Take by mouth daily at bedtime as needed      hydrOXYzine HCL (ATARAX) 25 mg tablet Take 25 mg by mouth every 6 (six) hours as needed for itching      hydrOXYzine pamoate (VISTARIL) 25 mg capsule       propranolol (INDERAL) 10 mg tablet Take 10 mg by mouth 3 (three) times a day as needed        Sertraline HCl (ZOLOFT PO) Take by mouth 2 (two) times a day  Ziprasidone HCl (GEODON PO) Take by mouth 2 (two) times a day       No current facility-administered medications for this visit  Allergies   Allergen Reactions    Codeine GI Intolerance    Fentanyl      Flushing    Penicillins Headache, Other (See Comments) and Vomiting     Severe vomitting      Naproxen Rash    Wellbutrin [Bupropion] Rash        Disposition:      I referred [facility] to one of our centralized sites for a COVID-19 swab  Did discussed with patient that she needs to be quarantined in her house until results have returned  Will also send over refill for her rescue inhaler to see if this will help with shortness of breath, and promethazine-DM to help with the cough  If it seems like symptoms are getting worse, recommend contacting the office for follow-up appointment  I spent 5 minutes with the patient during this virtual check-in visit

## 2020-03-31 LAB — SARS-COV-2 RNA SPEC QL NAA+PROBE: NOT DETECTED

## 2021-03-30 DIAGNOSIS — Z23 ENCOUNTER FOR IMMUNIZATION: ICD-10-CM

## 2021-05-19 ENCOUNTER — TELEPHONE (OUTPATIENT)
Dept: OBGYN CLINIC | Facility: CLINIC | Age: 36
End: 2021-05-19

## 2021-05-20 ENCOUNTER — TELEPHONE (OUTPATIENT)
Dept: OBGYN CLINIC | Facility: CLINIC | Age: 36
End: 2021-05-20

## 2021-06-17 ENCOUNTER — OFFICE VISIT (OUTPATIENT)
Dept: DENTISTRY | Facility: CLINIC | Age: 36
End: 2021-06-17

## 2021-06-17 VITALS — SYSTOLIC BLOOD PRESSURE: 112 MMHG | HEART RATE: 100 BPM | TEMPERATURE: 97.6 F | DIASTOLIC BLOOD PRESSURE: 78 MMHG

## 2021-06-17 DIAGNOSIS — K08.109 EDENTULISM: Primary | ICD-10-CM

## 2021-06-17 PROCEDURE — D0191 ASSESSMENT OF A PATIENT: HCPCS | Performed by: DENTIST

## 2021-06-17 NOTE — PROGRESS NOTES
Pt presents to clinic for delivery of denture remake  [facility], no changes  ASA 2    CC: none  Delivery:   -Adjusted upper and lower dentures using PIP paste to achieve comfort, and good stability   - VDO is 1-2mm too high, lower denture teeth are 3-4mm above smile line  Reduced height of lower denture teeth by ~1mm, symptom alleviated slightly  Speech clinically acceptable after adjustment, pt still has to slightly strain her lips to keep mouth closed    -Lower anterior denture teeth have high acrylic margin  Pt prefers trimming acrylic down NV  Instructed pt to wear dentures and observe for sore spots  Will adjust VDO and aesthetics of lower anterior NV      NV: reduce height of lower denture teeth  Trim acrylic down on lower anterior region  Adjustments     RIVKA  WW: Dr Jessica Lofton

## 2021-06-22 ENCOUNTER — HOSPITAL ENCOUNTER (EMERGENCY)
Facility: HOSPITAL | Age: 36
Discharge: HOME | End: 2021-06-23
Attending: EMERGENCY MEDICINE
Payer: COMMERCIAL

## 2021-06-22 DIAGNOSIS — R10.9 RIGHT FLANK PAIN: Primary | ICD-10-CM

## 2021-06-22 PROCEDURE — 99284 EMERGENCY DEPT VISIT MOD MDM: CPT

## 2021-06-23 ENCOUNTER — APPOINTMENT (EMERGENCY)
Dept: RADIOLOGY | Facility: HOSPITAL | Age: 36
End: 2021-06-23
Payer: COMMERCIAL

## 2021-06-23 VITALS
WEIGHT: 204 LBS | OXYGEN SATURATION: 99 % | RESPIRATION RATE: 18 BRPM | SYSTOLIC BLOOD PRESSURE: 165 MMHG | HEART RATE: 74 BPM | TEMPERATURE: 98.1 F | DIASTOLIC BLOOD PRESSURE: 84 MMHG | BODY MASS INDEX: 35.29 KG/M2

## 2021-06-23 LAB
BILIRUB UR QL STRIP: NEGATIVE
CLARITY UR: NORMAL
COLOR UR: NORMAL
EXT PREG TEST URINE: NEGATIVE
EXT. CONTROL ED NAV: NORMAL
GLUCOSE UR STRIP-MCNC: NEGATIVE MG/DL
HGB UR QL STRIP.AUTO: NEGATIVE
KETONES UR STRIP-MCNC: NEGATIVE MG/DL
LEUKOCYTE ESTERASE UR QL STRIP: NEGATIVE
NITRITE UR QL STRIP: NEGATIVE
PH UR STRIP.AUTO: 6 [PH]
PROT UR STRIP-MCNC: NEGATIVE MG/DL
SP GR UR STRIP.AUTO: 1.03 (ref 1–1.03)
UROBILINOGEN UR QL STRIP.AUTO: 0.2 E.U./DL

## 2021-06-23 PROCEDURE — 99284 EMERGENCY DEPT VISIT MOD MDM: CPT | Performed by: EMERGENCY MEDICINE

## 2021-06-23 PROCEDURE — G1004 CDSM NDSC: HCPCS

## 2021-06-23 PROCEDURE — 96372 THER/PROPH/DIAG INJ SC/IM: CPT

## 2021-06-23 PROCEDURE — 81025 URINE PREGNANCY TEST: CPT | Performed by: EMERGENCY MEDICINE

## 2021-06-23 PROCEDURE — 81003 URINALYSIS AUTO W/O SCOPE: CPT | Performed by: EMERGENCY MEDICINE

## 2021-06-23 PROCEDURE — 74176 CT ABD & PELVIS W/O CONTRAST: CPT

## 2021-06-23 RX ORDER — ONDANSETRON 4 MG/1
4 TABLET, ORALLY DISINTEGRATING ORAL ONCE
Status: COMPLETED | OUTPATIENT
Start: 2021-06-23 | End: 2021-06-23

## 2021-06-23 RX ORDER — KETOROLAC TROMETHAMINE 30 MG/ML
15 INJECTION, SOLUTION INTRAMUSCULAR; INTRAVENOUS ONCE
Status: DISCONTINUED | OUTPATIENT
Start: 2021-06-23 | End: 2021-06-23

## 2021-06-23 RX ORDER — KETOROLAC TROMETHAMINE 30 MG/ML
15 INJECTION, SOLUTION INTRAMUSCULAR; INTRAVENOUS ONCE
Status: COMPLETED | OUTPATIENT
Start: 2021-06-23 | End: 2021-06-23

## 2021-06-23 RX ADMIN — ONDANSETRON 4 MG: 4 TABLET, ORALLY DISINTEGRATING ORAL at 01:00

## 2021-06-23 RX ADMIN — KETOROLAC TROMETHAMINE 15 MG: 30 INJECTION, SOLUTION INTRAMUSCULAR; INTRAVENOUS at 00:59

## 2021-06-23 NOTE — ED ATTENDING ATTESTATION
6/22/2021  I, Richmond Oconnor MD, saw and evaluated the patient  I have discussed the patient with the resident/non-physician practitioner and agree with the resident's/non-physician practitioner's findings, Plan of Care, and MDM as documented in the resident's/non-physician practitioner's note, except where noted  All available labs and Radiology studies were reviewed  I was present for key portions of any procedure(s) performed by the resident/non-physician practitioner and I was immediately available to provide assistance  At this point I agree with the current assessment done in the Emergency Department  I have conducted an independent evaluation of this patient a history and physical is as follows:    ED Course     Emergency Department Note- Adele You 28 y o  female MRN: [id number]    Unit/Bed#: ED 25 Encounter: [id number]    Adele You is a 28 y o  female who presents with   Chief Complaint   Patient presents with    Flank Pain     pt woke up this morning with right sided flank pain that radiates into her abdominal area  denies blood in urine  pt also c/o nausea that comes and goes  History of Present Illness   HPI:  Molina You is a 28 y o  female who presents for evaluation of:  Urinary frequency, right flank pain, and nausea; her symptoms worsened this morning  Patient did vomit earlier yesterday  Patient had a low grade fever yesterday  Review of Systems   Constitutional: Positive for chills and fever  HENT: Positive for rhinorrhea  Negative for congestion  Respiratory: Positive for cough (related to smoking cigarrettes)  Negative for shortness of breath  Gastrointestinal: Positive for nausea and vomiting  Negative for abdominal pain  Genitourinary: Positive for flank pain (R sided) and frequency  Negative for dysuria and vaginal discharge  All other systems reviewed and are negative  No LMP recorded        Historical Information   Past Medical History:   Diagnosis Date  Anxiety     Bipolar 1 disorder (HCC)     Depression     IBS (irritable bowel syndrome)     Insomnia     Migraine     PTSD (post-traumatic stress disorder)      Past Surgical History:   Procedure Laterality Date    ANKLE HARDWARE REMOVAL      ANKLE SURGERY      CHOLECYSTECTOMY      FOOT SURGERY      MOUTH SURGERY  2011    metal placed from broken jaw    ORIF MANDIBULAR FRACTURE Bilateral 6/3/2019    Procedure: MANDIBLE CARROLL REMOVAL AND HARWARE REMOVAL FROM LEFT MANDIBLE ALVEOLAR RIDGE;  Surgeon: uCrt Feng DMD;  [facility];  Service: Maxillofacial    OTHER SURGICAL HISTORY      unlisted craniofacial and maxillofacial procedure     Social History   Social History     Substance and Sexual Activity   Alcohol Use Yes    Alcohol/week: 2 0 standard drinks    Types: 2 Glasses of wine per week    Comment: social     Social History     Substance and Sexual Activity   Drug Use No    Comment: Denied history of drug use     Social History     Tobacco Use   Smoking Status Current Every Day Smoker    Packs/day: 0 50   Smokeless Tobacco Never Used     Family History:   Family History   Problem Relation Age of Onset    Lung cancer Mother     Brain cancer Mother     Bone cancer Mother     Lung cancer Father     Bone cancer Father     Liver cancer Maternal Uncle     Lung cancer Paternal Uncle     Lung cancer Paternal Grandfather        Meds/Allergies   PTA meds:   Prior to Admission Medications   Prescriptions Last Dose Informant Patient Reported? Taking?    Acetaminophen (TYLENOL) 325 MG CAPS Not Taking at Unknown time Self Yes No   Sig: Take 2 capsules by mouth   Patient not taking: Reported on 6/22/2021   CLONIDINE HCL PO Not Taking at Unknown time Self Yes No   Sig: Take by mouth daily at bedtime as needed   Patient not taking: Reported on 6/22/2021   Promethazine-DM (PHENERGAN-DM) 6 25-15 mg/5 mL oral syrup   No No   Sig: Take 5 mL by mouth 4 (four) times a day as needed for cough Sertraline HCl (ZOLOFT PO)  Self Yes No   Sig: Take by mouth 2 (two) times a day   Ziprasidone HCl (GEODON PO)  Self Yes No   Sig: Take by mouth 2 (two) times a day   albuterol (Ventolin HFA) 90 mcg/act inhaler Past Month at Unknown time  No Yes   Sig: Inhale 2 puffs every 6 (six) hours as needed for wheezing   butalbital-acetaminophen-caffeine (FIORICET,ESGIC) -40 mg per tablet Not Taking at Unknown time Self No No   Sig: Take 1 tablet by mouth every 6 (six) hours as needed for headaches or migraine   Patient not taking: Reported on 2/12/2020   cloNIDine (CATAPRES) 0 1 mg tablet Not Taking at Unknown time Self Yes No   Patient not taking: Reported on 6/22/2021   clonazePAM (KlonoPIN) 0 5 mg tablet  Self Yes Yes   clonazePAM (KlonoPIN) 1 mg tablet Not Taking at Unknown time Self No No   Sig: Take 1 tablet (1 mg total) by mouth 2 (two) times a day   Patient not taking: Reported on 2/12/2020   hydrOXYzine HCL (ATARAX) 25 mg tablet  Self Yes No   Sig: Take 25 mg by mouth every 6 (six) hours as needed for itching   hydrOXYzine pamoate (VISTARIL) 25 mg capsule  Self Yes No   propranolol (INDERAL) 10 mg tablet 6/22/2021 at Unknown time Self Yes Yes   Sig: Take 10 mg by mouth 3 (three) times a day as needed        Facility-Administered Medications: None     Allergies   Allergen Reactions    Codeine GI Intolerance    Fentanyl      Flushing    Penicillins Headache, Other (See Comments) and Vomiting     Severe vomitting      Naproxen Rash    Wellbutrin [Bupropion] Rash       Objective   First Vitals:   Blood Pressure: 165/84 (06/22/21 2336)  Pulse: 74 (06/22/21 2336)  Temperature: 98 1 °F (36 7 °C) (06/23/21 0012)  Temp Source: Oral (06/23/21 0012)  Respirations: 18 (06/22/21 2336)  Weight - Scale: 92 5 kg (204 lb) (06/22/21 2336)  SpO2: 99 % (06/22/21 2336)    Current Vitals:   Blood Pressure: 165/84 (06/22/21 2336)  Pulse: 74 (06/22/21 2336)  Temperature: 98 1 °F (36 7 °C) (06/23/21 0012)  Temp Source: Oral (21 0012)  Respirations: 18 (21)  Weight - Scale: 92 5 kg (204 lb) (21)  SpO2: 99 % (21)    No intake or output data in the 24 hours ending 21 0034    Invasive Devices     None                 Physical Exam  Vitals and nursing note reviewed  Constitutional:       Appearance: Normal appearance  HENT:      Head: Normocephalic and atraumatic  Nose: Nose normal       Mouth/Throat:      Mouth: Mucous membranes are moist       Pharynx: Oropharynx is clear  Eyes:      Conjunctiva/sclera: Conjunctivae normal       Pupils: Pupils are equal, round, and reactive to light  Cardiovascular:      Rate and Rhythm: Normal rate and regular rhythm  Pulmonary:      Effort: Pulmonary effort is normal  No respiratory distress  Abdominal:      General: Bowel sounds are normal       Palpations: Abdomen is soft  Musculoskeletal:         General: Normal range of motion  Skin:     General: Skin is warm and dry  Capillary Refill: Capillary refill takes less than 2 seconds  Neurological:      General: No focal deficit present  Mental Status: She is alert and oriented to person, place, and time  Psychiatric:         Mood and Affect: Mood normal          Behavior: Behavior normal          Thought Content: Thought content normal          Judgment: Judgment normal            Medical Decision Makin  Acute flank pain: UA    Recent Results (from the past 36 hour(s))   POCT pregnancy, urine    Collection Time: 21 12:15 AM   Result Value Ref Range    EXT PREG TEST UR (Ref: Negative) negative     Control valid      No orders to display         Portions of the record may have been created with voice recognition software  Occasional wrong word or "sound a like" substitutions may have occurred due to the inherent limitations of voice recognition software  Read the chart carefully and recognize, using context, where substitutions have occurred          Critical Care Time  Procedures

## 2021-06-23 NOTE — ED NOTES
Pt came out of her room stated " My  has to get up in the morning I had my test an hour ago I'm leaving"  Pt proceeded to walk out of ED  Attempted to inform pt of reasoning for waiting pt continued to walk out   Dr Olga Ennis made aware      Audelia Ivy, JEAN PIERRE  06/23/21 [id number]

## 2021-06-23 NOTE — ED PROVIDER NOTES
History  Chief Complaint   Patient presents with    Flank Pain     pt woke up this morning with right sided flank pain that radiates into her abdominal area  denies blood in urine  pt also c/o nausea that comes and goes  HPI  Patient is 28year old female presenting with right sided flank pain  Pain started today and states that she woke up with the pain and it radiates down to her pelvis  She describes it as a sharp pain that starts at the right flank and migrates down to her pelvis  She notes that she has been having increase in urinary frequency  Yesterday she felt feverish and temp was 100 3 with couple episodes of chills  Today she had 3 loose stools  Intermittent n/v  Surgical history of gallbladder removal but no other abdominal surgery  Patient engages in unprotected sex and is not on any birth control pills  Denies any vaginal discharge or vaginal bleed  No headache, chest pain, shortness of breath, abdominal pain              Prior to Admission Medications   Prescriptions Last Dose Informant Patient Reported? Taking?    Acetaminophen (TYLENOL) 325 MG CAPS Not Taking at Unknown time Self Yes No   Sig: Take 2 capsules by mouth   Patient not taking: Reported on 6/22/2021   CLONIDINE HCL PO Not Taking at Unknown time Self Yes No   Sig: Take by mouth daily at bedtime as needed   Patient not taking: Reported on 6/22/2021   Promethazine-DM (PHENERGAN-DM) 6 25-15 mg/5 mL oral syrup   No No   Sig: Take 5 mL by mouth 4 (four) times a day as needed for cough   Sertraline HCl (ZOLOFT PO)  Self Yes No   Sig: Take by mouth 2 (two) times a day   Ziprasidone HCl (GEODON PO)  Self Yes No   Sig: Take by mouth 2 (two) times a day   albuterol (Ventolin HFA) 90 mcg/act inhaler Past Month at Unknown time  No Yes   Sig: Inhale 2 puffs every 6 (six) hours as needed for wheezing   butalbital-acetaminophen-caffeine (FIORICET,ESGIC) -40 mg per tablet Not Taking at Unknown time Self No No   Sig: Take 1 tablet by mouth every 6 (six) hours as needed for headaches or migraine   Patient not taking: Reported on 2/12/2020   cloNIDine (CATAPRES) 0 1 mg tablet Not Taking at Unknown time Self Yes No   Patient not taking: Reported on 6/22/2021   clonazePAM (KlonoPIN) 0 5 mg tablet  Self Yes Yes   clonazePAM (KlonoPIN) 1 mg tablet Not Taking at Unknown time Self No No   Sig: Take 1 tablet (1 mg total) by mouth 2 (two) times a day   Patient not taking: Reported on 2/12/2020   hydrOXYzine HCL (ATARAX) 25 mg tablet  Self Yes No   Sig: Take 25 mg by mouth every 6 (six) hours as needed for itching   hydrOXYzine pamoate (VISTARIL) 25 mg capsule  Self Yes No   propranolol (INDERAL) 10 mg tablet 6/22/2021 at Unknown time Self Yes Yes   Sig: Take 10 mg by mouth 3 (three) times a day as needed        Facility-Administered Medications: None       Past Medical History:   Diagnosis Date    Anxiety     Bipolar 1 disorder ([facility] [address] )     Depression     IBS (irritable bowel syndrome)     Insomnia     Migraine     PTSD (post-traumatic stress disorder)        Past Surgical History:   Procedure Laterality Date    ANKLE HARDWARE REMOVAL      ANKLE SURGERY      CHOLECYSTECTOMY      FOOT SURGERY      MOUTH SURGERY  2011    metal placed from broken jaw    ORIF MANDIBULAR FRACTURE Bilateral 6/3/2019    Procedure: MANDIBLE CARROLL REMOVAL AND HARWARE REMOVAL FROM LEFT MANDIBLE ALVEOLAR RIDGE;  Surgeon: Edouard Meadows DMD;  [facility];  Service: Maxillofacial    OTHER SURGICAL HISTORY      unlisted craniofacial and maxillofacial procedure       Family History   Problem Relation Age of Onset    Lung cancer Mother     Brain cancer Mother     Bone cancer Mother     Lung cancer Father     Bone cancer Father     Liver cancer Maternal Uncle     Lung cancer Paternal Uncle     Lung cancer Paternal Grandfather      I have reviewed and agree with the history as documented      E-Cigarette/Vaping     E-Cigarette/Vaping Substances     Social History Tobacco Use    Smoking status: Current Every Day Smoker     Packs/day: 0 50    Smokeless tobacco: Never Used   Substance Use Topics    Alcohol use: Yes     Alcohol/week: 2 0 standard drinks     Types: 2 Glasses of wine per week     Comment: social    Drug use: No     Comment: Denied history of drug use        Review of Systems   Constitutional: Positive for fever  Negative for chills, diaphoresis and unexpected weight change  HENT: Negative for ear pain and sore throat  Eyes: Negative for visual disturbance  Respiratory: Negative for cough, chest tightness and shortness of breath  Cardiovascular: Negative for chest pain and leg swelling  Gastrointestinal: Negative for abdominal distention, abdominal pain, constipation, diarrhea, nausea and vomiting  Endocrine: Negative  Genitourinary: Positive for frequency  Negative for difficulty urinating and dysuria  Musculoskeletal:        Right flank pain    Skin: Negative  Allergic/Immunologic: Negative  Neurological: Negative  Hematological: Negative  Psychiatric/Behavioral: Negative  All other systems reviewed and are negative  Physical Exam  ED Triage Vitals   Temperature Pulse Respirations Blood Pressure SpO2   06/23/21 0012 06/22/21 2336 06/22/21 2336 06/22/21 2336 06/22/21 2336   98 1 °F (36 7 °C) 74 18 165/84 99 %      Temp Source Heart Rate Source Patient Position - Orthostatic VS BP Location FiO2 (%)   06/23/21 0012 -- -- -- --   Oral          Pain Score       06/23/21 0059       9             Orthostatic Vital Signs  Vitals:    06/22/21 2336   BP: 165/84   Pulse: 74       Physical Exam  Vitals and nursing note reviewed  Constitutional:       General: She is not in acute distress  Appearance: Normal appearance  She is not ill-appearing  HENT:      Head: Normocephalic and atraumatic        Right Ear: External ear normal       Left Ear: External ear normal       Nose: Nose normal       Mouth/Throat:      Mouth: Mucous membranes are moist       Pharynx: Oropharynx is clear  Eyes:      General: No scleral icterus  Right eye: No discharge  Left eye: No discharge  Extraocular Movements: Extraocular movements intact  Conjunctiva/sclera: Conjunctivae normal       Pupils: Pupils are equal, round, and reactive to light  Cardiovascular:      Rate and Rhythm: Normal rate and regular rhythm  Pulses: Normal pulses  Heart sounds: Normal heart sounds  Pulmonary:      Effort: Pulmonary effort is normal       Breath sounds: Normal breath sounds  Abdominal:      General: Abdomen is flat  Bowel sounds are normal  There is no distension  Palpations: Abdomen is soft  Tenderness: There is no abdominal tenderness  There is right CVA tenderness  There is no guarding or rebound  Musculoskeletal:         General: Normal range of motion  Cervical back: Normal range of motion and neck supple  Skin:     General: Skin is warm and dry  Capillary Refill: Capillary refill takes less than 2 seconds  Neurological:      General: No focal deficit present  Mental Status: She is alert and oriented to person, place, and time  Mental status is at baseline  Psychiatric:         Mood and Affect: Mood normal          Behavior: Behavior normal          Thought Content:  Thought content normal          Judgment: Judgment normal          ED Medications  Medications   ketorolac (TORADOL) injection 15 mg (15 mg Intramuscular Given 6/23/21 0059)   ondansetron (ZOFRAN-ODT) dispersible tablet 4 mg (4 mg Oral Given 6/23/21 0100)       Diagnostic Studies  Results Reviewed     Procedure Component Value Units Date/Time    UA (URINE) with reflex to Scope [[id number]] Collected: 06/23/21 0017    Lab Status: Final result Specimen: Urine, Clean Catch Updated: 06/23/21 0151     Color, UA Straw     Clarity, UA Hazy     Specific [place], UA 1 030     pH, UA 6 0     Leukocytes, UA Negative     Nitrite, UA Negative     Protein, UA Negative mg/dl      Glucose, UA Negative mg/dl      Ketones, UA Negative mg/dl      Urobilinogen, UA 0 2 E U /dl      Bilirubin, UA Negative     Blood, UA Negative    POCT pregnancy, urine [[id number]]  (Normal) Resulted: 06/23/21 0015    Lab Status: Final result Updated: 06/23/21 0015     EXT PREG TEST UR (Ref: Negative) negative     Control valid                 CT renal stone study abdomen pelvis wo contrast   Final Result by Melissa Ventura MD (06/23 0004)      No evidence of obstructive uropathy or nephroureterolithiasis  Workstation performed: [id number]               Procedures  Procedures      ED Course                             SBIRT 20yo+      Most Recent Value   SBIRT (22 yo +)   In order to provide better care to our patients, we are screening all of our patients for alcohol and drug use  Would it be okay to ask you these screening questions?   No Filed at: 06/23/2021 0108                MDM  Number of Diagnoses or Management Options  Right flank pain  Diagnosis management comments:    Patient is 28year old female with right flank pain and urinary frequency   Will obtain urinalysis and urine pregnancy   CT renal stone study showed no abnormality   Patient left without receiving discharge instructions     Disposition  Final diagnoses:   Right flank pain     Time reflects when diagnosis was documented in both MDM as applicable and the Disposition within this note     Time User Action Codes Description Comment    6/23/2021  2:52 AM Lamona Nails Add [R10 9] Right flank pain       ED Disposition     ED Disposition Condition Date/Time Comment    Left from Room after Provider Exam  Wed Jun 23, 2021  2:35 AM       Follow-up Information    None         Discharge Medication List as of 6/23/2021  2:36 AM      CONTINUE these medications which have NOT CHANGED    Details   albuterol (Ventolin HFA) 90 mcg/act inhaler Inhale 2 puffs every 6 (six) hours as needed for wheezing, Starting Thu 3/26/2020, Normal      !! clonazePAM (KlonoPIN) 0 5 mg tablet Starting Mon 2/3/2020, Historical Med      propranolol (INDERAL) 10 mg tablet Take 10 mg by mouth 3 (three) times a day as needed  , Historical Med      Acetaminophen (TYLENOL) 325 MG CAPS Take 2 capsules by mouth, Historical Med      butalbital-acetaminophen-caffeine (FIORICET,ESGIC) -40 mg per tablet Take 1 tablet by mouth every 6 (six) hours as needed for headaches or migraine, Starting Tue 2/4/2020, Normal      !! clonazePAM (KlonoPIN) 1 mg tablet Take 1 tablet (1 mg total) by mouth 2 (two) times a day, Starting Wed 6/26/2019, Normal      !! cloNIDine (CATAPRES) 0 1 mg tablet Starting Thu 1/23/2020, Historical Med      !! CLONIDINE HCL PO Take by mouth daily at bedtime as needed, Historical Med      hydrOXYzine HCL (ATARAX) 25 mg tablet Take 25 mg by mouth every 6 (six) hours as needed for itching, Historical Med      hydrOXYzine pamoate (VISTARIL) 25 mg capsule Starting Mon 2/3/2020, Historical Med      Promethazine-DM (PHENERGAN-DM) 6 25-15 mg/5 mL oral syrup Take 5 mL by mouth 4 (four) times a day as needed for cough, Starting Thu 3/26/2020, Normal      Sertraline HCl (ZOLOFT PO) Take by mouth 2 (two) times a day, Historical Med      Ziprasidone HCl (GEODON PO) Take by mouth 2 (two) times a day, Historical Med       !! - Potential duplicate medications found  Please discuss with provider  No discharge procedures on file  PDMP Review     None           ED Provider  Attending physically available and evaluated Vivian A Day  I managed the patient along with the ED Attending      Electronically Signed by         Alexander Ospina MD  06/24/21 8333

## 2021-06-29 ENCOUNTER — OFFICE VISIT (OUTPATIENT)
Dept: DENTISTRY | Facility: CLINIC | Age: 36
End: 2021-06-29

## 2021-06-29 VITALS — TEMPERATURE: 97.3 F | HEART RATE: 99 BPM | SYSTOLIC BLOOD PRESSURE: 133 MMHG | DIASTOLIC BLOOD PRESSURE: 85 MMHG

## 2021-06-29 DIAGNOSIS — Z01.20 ENCOUNTER FOR DENTAL EXAMINATION: Primary | ICD-10-CM

## 2021-06-29 NOTE — PROGRESS NOTES
Removable    Pt presents for a denture visit and gave verbal consent for treatment:    Reviewed health history - Pt is ASA  II    Treatment consents signed: Yes    Perio: Non applicable    Pain Scale: 0    Caries Assessment: Non applicable     Radiographs: Films are current    Pt presents for Denture adjustment on Lower RPD after insertion Maxillary denture required additional adjustment on right posterior flange  Lower RPD adjusted with aid of PIP paste, results in the reduction of labial and lingual flanges  Pt has multiple muscle attachments requiring the scalloping of anterior labial flange  Lower RPD is stable and with slight denture adhesive stays in place and is functional  Occlusion is balanced and pt informed to try and wear it and chew with it for a week and return for future adjustments

## 2021-06-30 ENCOUNTER — OFFICE VISIT (OUTPATIENT)
Dept: FAMILY MEDICINE CLINIC | Facility: CLINIC | Age: 36
End: 2021-06-30

## 2021-06-30 VITALS
TEMPERATURE: 98.1 F | HEART RATE: 86 BPM | SYSTOLIC BLOOD PRESSURE: 122 MMHG | HEIGHT: 63 IN | RESPIRATION RATE: 20 BRPM | BODY MASS INDEX: 43.62 KG/M2 | WEIGHT: 246.2 LBS | DIASTOLIC BLOOD PRESSURE: 78 MMHG | OXYGEN SATURATION: 97 %

## 2021-06-30 DIAGNOSIS — M54.41 ACUTE RIGHT-SIDED LOW BACK PAIN WITH RIGHT-SIDED SCIATICA: ICD-10-CM

## 2021-06-30 DIAGNOSIS — Z00.00 ANNUAL PHYSICAL EXAM: Primary | ICD-10-CM

## 2021-06-30 DIAGNOSIS — N39.3 STRESS INCONTINENCE: ICD-10-CM

## 2021-06-30 DIAGNOSIS — Z11.4 ENCOUNTER FOR SCREENING FOR HIV: ICD-10-CM

## 2021-06-30 DIAGNOSIS — Z11.3 ROUTINE SCREENING FOR STI (SEXUALLY TRANSMITTED INFECTION): ICD-10-CM

## 2021-06-30 DIAGNOSIS — E66.01 CLASS 3 SEVERE OBESITY DUE TO EXCESS CALORIES WITHOUT SERIOUS COMORBIDITY WITH BODY MASS INDEX (BMI) OF 40.0 TO 44.9 IN ADULT (HCC): ICD-10-CM

## 2021-06-30 DIAGNOSIS — J45.20 MILD INTERMITTENT ASTHMA WITHOUT COMPLICATION: ICD-10-CM

## 2021-06-30 DIAGNOSIS — Z12.4 CERVICAL CANCER SCREENING: ICD-10-CM

## 2021-06-30 PROCEDURE — 99395 PREV VISIT EST AGE 18-39: CPT | Performed by: NURSE PRACTITIONER

## 2021-06-30 RX ORDER — OXYBUTYNIN CHLORIDE 5 MG/1
5 TABLET, EXTENDED RELEASE ORAL DAILY
Qty: 30 TABLET | Refills: 0 | Status: SHIPPED | OUTPATIENT
Start: 2021-06-30

## 2021-06-30 RX ORDER — ALBUTEROL SULFATE 90 UG/1
2 AEROSOL, METERED RESPIRATORY (INHALATION) EVERY 6 HOURS PRN
Qty: 18 G | Refills: 2 | Status: SHIPPED | OUTPATIENT
Start: 2021-06-30 | End: 2022-03-30 | Stop reason: SDUPTHER

## 2021-06-30 RX ORDER — METHOCARBAMOL 500 MG/1
500 TABLET, FILM COATED ORAL 3 TIMES DAILY PRN
Qty: 40 TABLET | Refills: 0 | Status: SHIPPED | OUTPATIENT
Start: 2021-06-30

## 2021-06-30 NOTE — ASSESSMENT & PLAN NOTE
Right sided lumbar pain w/ radiation to right leg, + RSLR  Check x-ray   Start Robaxin  Provided with stretches to perform at home  May refer to PT

## 2021-06-30 NOTE — PROGRESS NOTES
[address] [facility]    NAME: Singh You  AGE: 28 y o  SEX: female  : 1985     DATE: 2021     Assessment and Plan:     Problem List Items Addressed This Visit        Respiratory    Mild intermittent asthma without complication     Using albuterol PRN          Relevant Medications    albuterol (Ventolin HFA) 90 mcg/act inhaler       Nervous and Auditory    Acute right-sided low back pain with right-sided sciatica     Right sided lumbar pain w/ radiation to right leg, + RSLR  Check x-ray   Start Robaxin  Provided with stretches to perform at home  May refer to PT          Relevant Medications    methocarbamol (ROBAXIN) 500 mg tablet    Other Relevant Orders    XR spine lumbar minimum 4 views non injury       Other    Obesity    Relevant Orders    CBC and differential    Comprehensive metabolic panel    Hemoglobin A1C    Lipid panel    TSH, 3rd generation with Free T4 reflex    Stress incontinence     Loss of urine with coughing, sneezing, jumping since birth of her last child   Will trial Ditropan   Discussed Kegel exercises   Referral to pelvic floor PT and GYN          Relevant Medications    oxybutynin (DITROPAN-XL) 5 mg 24 hr tablet    Other Relevant Orders    Ambulatory referral to Physical Therapy      Other Visit Diagnoses     Annual physical exam    -  Primary    Cervical cancer screening        Relevant Orders    Ambulatory referral to Obstetrics / Gynecology    Routine screening for STI (sexually transmitted infection)        Relevant Orders    RPR    Chlamydia/GC amplified DNA by PCR    Hepatitis panel, acute    Encounter for screening for HIV        Relevant Orders    HIV 1/2 Antigen/Antibody (4th Generation) w Reflex [facility]          Immunizations and preventive care screenings were discussed with patient today  Appropriate education was printed on patient's after visit summary      Counseling:  Alcohol/drug use: discussed moderation in alcohol intake, the recommendations for healthy alcohol use, and avoidance of illicit drug use  Dental Health: discussed importance of regular tooth brushing, flossing, and dental visits  Injury prevention: discussed safety/seat belts, safety helmets, smoke detectors, carbon dioxide detectors, and smoking near bedding or upholstery  Sexual health: discussed sexually transmitted diseases, partner selection, use of condoms, avoidance of unintended pregnancy, and contraceptive alternatives  · Exercise: the importance of regular exercise/physical activity was discussed  Recommend exercise 3-5 times per week for at least 30 minutes  BMI Counseling: Body mass index is 43 61 kg/m²  The BMI is above normal  Nutrition recommendations include decreasing portion sizes, encouraging healthy choices of fruits and vegetables, decreasing fast food intake, consuming healthier snacks and limiting drinks that contain sugar  Exercise recommendations include moderate physical activity 150 minutes/week and exercising 3-5 times per week  Return in about 4 weeks (around 7/28/2021)  Chief Complaint:     Chief Complaint   Patient presents with    Follow-up     lower back pain right side pt states everytime she cough she will get pain on that area ed f/u 6/22/2021 "pt had a ct scan would like to talk about it"     Asthma     pt would like refill on her albuterol     Urinary Frequency      History of Present Illness:     Adult Annual Physical   Patient here for a comprehensive physical exam  Patient was seen in the ED recently for right low back pain  Had CT of abdomen with no acute findings  Reports today that back pain persists  Pain travels down right leg  She also notes that since the vaginal delivery of her last child she has had urinary dribbling with coughing, sneezing, laughing, or jumping  She is a current smoker       Diet and Physical Activity  · Diet/Nutrition: limited fruits/vegetables  · Exercise: no formal exercise  Depression Screening  PHQ-9 Depression Screening    PHQ-9:   Frequency of the following problems over the past two weeks:           General Health  · Sleep: gets 4-6 hours of sleep on average  · Hearing: normal - bilateral   · Vision: no vision problems  · Dental: regular dental visits  Getting dentures     /GYN Health  · Last menstrual period: 6/13/2021  · Contraceptive method: none  · History of STDs?: no      Review of Systems:     Review of Systems   Constitutional: Negative for chills and fever  HENT: Negative for ear pain and sore throat  Eyes: Negative for pain and visual disturbance  Respiratory: Negative for cough and shortness of breath  Cardiovascular: Negative for chest pain and palpitations  Gastrointestinal: Negative for abdominal pain and vomiting  Genitourinary: Negative for dysuria and hematuria  Musculoskeletal: Positive for arthralgias, back pain and myalgias  Skin: Negative for color change and rash  Neurological: Negative for seizures and syncope  All other systems reviewed and are negative       Past Medical History:     Past Medical History:   Diagnosis Date    Anxiety     Bipolar 1 disorder ([facility] [address] )     Depression     IBS (irritable bowel syndrome)     Insomnia     Migraine     PTSD (post-traumatic stress disorder)       Past Surgical History:     Past Surgical History:   Procedure Laterality Date    ANKLE HARDWARE REMOVAL      ANKLE SURGERY      CHOLECYSTECTOMY      FOOT SURGERY      MOUTH SURGERY  2011    metal placed from broken jaw    ORIF MANDIBULAR FRACTURE Bilateral 6/3/2019    Procedure: MANDIBLE CARROLL REMOVAL AND HARWARE REMOVAL FROM LEFT MANDIBLE ALVEOLAR RIDGE;  Surgeon: Bowen Galan DMD;  [facility];  Service: Maxillofacial    OTHER SURGICAL HISTORY      unlisted craniofacial and maxillofacial procedure      Social History:     Social History     Socioeconomic History    Marital status: Single     Spouse name: None    Number of children: None    Years of education: None    Highest education level: None   Occupational History    None   Tobacco Use    Smoking status: Current Every Day Smoker     Packs/day: 0 50    Smokeless tobacco: Never Used   Substance and Sexual Activity    Alcohol use: Yes     Alcohol/week: 2 0 standard drinks     Types: 2 Glasses of wine per week     Comment: social    Drug use: No     Comment: Denied history of drug use    Sexual activity: None   Other Topics Concern    None   Social History Narrative    None     Social Determinants of Health     Financial Resource Strain:     Difficulty of Paying Living Expenses:    Food Insecurity:     Worried About Running Out of Food in the Last Year:     Ran Out of Food in the Last Year:    Transportation Needs:     Lack of Transportation (Medical):      Lack of Transportation (Non-Medical):    Physical Activity:     Days of Exercise per Week:     Minutes of Exercise per Session:    Stress:     Feeling of Stress :    Social Connections:     Frequency of Communication with Friends and Family:     Frequency of Social Gatherings with Friends and Family:     Attends [employer] Services:     Active Member of Clubs or Organizations:     Attends Club or Organization Meetings:     Marital Status:    Intimate Partner Violence:     Fear of Current or Ex-Partner:     Emotionally Abused:     Physically Abused:     Sexually Abused:       Family History:     Family History   Problem Relation Age of Onset    Lung cancer Mother     Brain cancer Mother     Bone cancer Mother     Lung cancer Father     Bone cancer Father     Liver cancer Maternal Uncle     Lung cancer Paternal Uncle     Lung cancer Paternal Grandfather       Current Medications:     Current Outpatient Medications   Medication Sig Dispense Refill    albuterol (Ventolin HFA) 90 mcg/act inhaler Inhale 2 puffs every 6 (six) hours as needed for wheezing 18 g 2    clonazePAM (KlonoPIN) 0 5 mg tablet       hydrOXYzine pamoate (VISTARIL) 25 mg capsule       propranolol (INDERAL) 10 mg tablet Take 10 mg by mouth 3 (three) times a day as needed        Acetaminophen (TYLENOL) 325 MG CAPS Take 2 capsules by mouth (Patient not taking: Reported on 6/22/2021)      butalbital-acetaminophen-caffeine (FIORICET,ESGIC) -40 mg per tablet Take 1 tablet by mouth every 6 (six) hours as needed for headaches or migraine (Patient not taking: Reported on 2/12/2020) 10 tablet 0    cloNIDine (CATAPRES) 0 1 mg tablet  (Patient not taking: Reported on 6/22/2021)      CLONIDINE HCL PO Take by mouth daily at bedtime as needed (Patient not taking: Reported on 6/22/2021)      hydrOXYzine HCL (ATARAX) 25 mg tablet Take 25 mg by mouth every 6 (six) hours as needed for itching      methocarbamol (ROBAXIN) 500 mg tablet Take 1 tablet (500 mg total) by mouth 3 (three) times a day as needed for muscle spasms 40 tablet 0    oxybutynin (DITROPAN-XL) 5 mg 24 hr tablet Take 1 tablet (5 mg total) by mouth daily 30 tablet 0    Ziprasidone HCl (GEODON PO) Take by mouth 2 (two) times a day (Patient not taking: Reported on 6/30/2021)       No current facility-administered medications for this visit  Allergies: Allergies   Allergen Reactions    Codeine GI Intolerance    Fentanyl      Flushing    Penicillins Headache, Other (See Comments) and Vomiting     Severe vomitting      Naproxen Rash    Wellbutrin [Bupropion] Rash      Physical Exam:     /78 (BP Location: Left arm, Patient Position: Sitting, Cuff Size: Adult)   Pulse 86   Temp 98 1 °F (36 7 °C) (Temporal)   Resp 20   Ht 5' 3" (1 6 m)   Wt 112 kg (246 lb 3 2 oz)   LMP 06/13/2021 (Within Days)   SpO2 97%   BMI 43 61 kg/m²     Physical Exam  Vitals and nursing note reviewed  Constitutional:       General: She is not in acute distress  Appearance: Normal appearance  She is well-developed   She is not diaphoretic  HENT:      Head: Normocephalic and atraumatic  Right Ear: Tympanic membrane and external ear normal       Left Ear: Tympanic membrane and external ear normal       Nose: Nose normal       Mouth/Throat:      Mouth: Mucous membranes are moist    Eyes:      General:         Right eye: No discharge  Left eye: No discharge  Extraocular Movements: Extraocular movements intact  Conjunctiva/sclera: Conjunctivae normal       Pupils: Pupils are equal, round, and reactive to light  Cardiovascular:      Rate and Rhythm: Normal rate and regular rhythm  Pulses: Normal pulses  Heart sounds: Normal heart sounds  No murmur heard  Pulmonary:      Effort: Pulmonary effort is normal  No respiratory distress  Breath sounds: Normal breath sounds  Abdominal:      General: Bowel sounds are normal  There is no distension  Palpations: Abdomen is soft  Tenderness: There is no abdominal tenderness  Musculoskeletal:      Cervical back: Normal range of motion and neck supple  No rigidity  Lumbar back: Tenderness present  Positive right straight leg raise test       Right lower leg: No edema  Left lower leg: No edema  Lymphadenopathy:      Cervical: No cervical adenopathy  Skin:     General: Skin is warm and dry  Capillary Refill: Capillary refill takes less than 2 seconds  Findings: No rash  Neurological:      General: No focal deficit present  Mental Status: She is alert and oriented to person, place, and time     Psychiatric:         Mood and Affect: Mood normal          Behavior: Behavior normal           Lisette Velez 34

## 2021-06-30 NOTE — PATIENT INSTRUCTIONS
Lower Back Exercises   AMBULATORY CARE:   Lower back exercises  help heal and strengthen your back muscles to prevent another injury  Ask your healthcare provider if you need to see a physical therapist for more advanced exercises  Seek care immediately if:   · You have severe pain that prevents you from moving  Contact your healthcare provider if:   · Your pain becomes worse  · You have new pain  · You have questions or concerns about your condition or care  Do lower back exercises safely:   · Do the exercises on a mat or firm surface  (not on a bed) to support your spine and prevent low back pain  · Move slowly and smoothly  Avoid fast or jerky motions  · Breathe normally  Do not hold your breath  · Stop if you feel pain  It is normal to feel some discomfort at first  Regular exercise will help decrease your discomfort over time  Lower back exercises: Your healthcare provider may recommend that you do back exercises 10 to 30 minutes each day  He may also recommend that you do exercises 1 to 3 times each day  Ask your healthcare provider which exercises are best for you and how often to do them  · Ankle pumps:  Lie on your back  Move your foot up (with your toes pointing toward your head)  Then, move your foot down (with your toes pointing away from you)  Repeat this exercise 10 times on each side  · Heel slides:  Lie on your back  Slowly bend one leg and then straighten it  Next, bend the other leg and then straighten it  Repeat 10 times on each side  · Pelvic tilt:  Lie on your back with your knees bent and feet flat on the floor  Place your arms in a relaxed position beside your body  Tighten the muscles of your abdomen and flatten your back against the floor  Hold for 5 seconds  Repeat 5 times  · Back stretch:  Lie on your back with your hands behind your head  Bend your knees and turn the lower half of your body to one side   Hold this position for 10 seconds  Repeat 3 times on each side  · Straight leg raises:  Lie on your back with one leg straight  Bend the other knee  Tighten your abdomen and then slowly lift the straight leg up about 6 to 12 inches off the floor  Hold for 1 to 5 seconds  Lower your leg slowly  Repeat 10 times on each leg  · Knee-to-chest:  Lie on your back with your knees bent and feet flat on the floor  Pull one of your knees toward your chest and hold it there for 5 seconds  Return your leg to the starting position  Lift the other knee toward your chest and hold for 5 seconds  Do this 5 times on each side  · Cat and camel:  Place your hands and knees on the floor  Arch your back upward toward the ceiling and lower your head  Round out your spine as much as you can  Hold for 5 seconds  Lift your head upward and push your chest downward toward the floor  Hold for 5 seconds  Do 3 sets or as directed  · Wall squats:  Stand with your back against a wall  Tighten the muscles of your abdomen  Slowly lower your body until your knees are bent at a 45 degree angle  Hold this position for 5 seconds  Slowly move back up to a standing position  Repeat 10 times  · Curl up:  Lie on your back with your knees bent and feet flat on the floor  Place your hands, palms down, underneath the curve in your lower back  Next, with your elbows on the floor, lift your shoulders and chest 2 to 3 inches  Keep your head in line with your shoulders  Hold this position for 5 seconds  When you can do this exercise without pain for 10 to 15 seconds, you may add a rotation  While your shoulders and chest are lifted off the ground, turn slightly to the left and hold  Repeat on the other side  · Bird dog:  Place your hands and knees on the floor  Keep your wrists directly below your shoulders and your knees directly below your hips  Pull your belly button in toward your spine  Do not flatten or arch your back   Tighten your abdominal muscles  Raise one arm straight out so that it is aligned with your head  Next, raise the leg opposite your arm  Hold this position for 15 seconds  Lower your arm and leg slowly and change sides  Do 5 sets  © Copyright [address] Information is for End User's use only and may not be sold, redistributed or otherwise used for commercial purposes  All illustrations and images included in CareNotes® are the copyrighted property of A D A M , Inc  or Vi Jolley  The above information is an  only  It is not intended as medical advice for individual conditions or treatments  Talk to your doctor, nurse or pharmacist before following any medical regimen to see if it is safe and effective for you  Kegel Exercises for Women   AMBULATORY CARE:   Kegel exercises  help strengthen your pelvic muscles  Pelvic muscles hold your pelvic organs, such as your bladder and uterus, in place  Kegel exercises help prevent or control problems with urine incontinence (leakage)  Incontinence may be caused by pregnancy, childbirth, or menopause  Contact your healthcare provider if:   · You cannot feel your muscles tighten or relax  · You continue to leak urine  · You have questions or concerns about your condition or care  Use the correct muscles:  Pelvic muscles are the muscles you use to control urine flow  To target these muscles, stop and start the flow of urine several times  This will help you become familiar with how it feels to tighten and relax these muscles  How to do Kegel exercises:   · Empty your bladder  You may lie down, stand up, or sit down to do these exercises  When you first try to do these exercises, it may be easier if you lie down  Tighten or squeeze your pelvic muscles slowly  It may feel like you are trying to hold back urine or gas  Hold this position for 3 seconds  Relax for 3 seconds  Repeat this cycle 10 times       · Do 10 sets of Kegel exercises, at least 3 times a day  Do not hold your breath when you do Kegel exercises  Keep your stomach, back, and leg muscles relaxed  · As your muscles get stronger, you will be able to hold the squeeze longer  Your healthcare provider may ask that you increase your pelvic muscle squeeze to 10 seconds  After you squeeze for 10 seconds, relax for 10 seconds  What else you should know:   · Once you know how to do Kegel exercises, use different positions  You can do these exercises while you lie on the floor, sit at your desk or watch TV, and while you stand  · You may notice improved bladder control within about 6 weeks  · Tighten your pelvic muscles before you sneeze, cough, or lift to prevent urine leakage  Follow up with your healthcare provider as directed:  Write down your questions so you remember to ask them during your visits  © Copyright [facility] [address] Information is for End User's use only and may not be sold, redistributed or otherwise used for commercial purposes  All illustrations and images included in CareNotes® are the copyrighted property of A D A M , Inc  or [address] [place] [address]  The above information is an  only  It is not intended as medical advice for individual conditions or treatments  Talk to your doctor, nurse or pharmacist before following any medical regimen to see if it is safe and effective for you  Wellness Visit for Adults   AMBULATORY CARE:   A wellness visit  is when you see your healthcare provider to get screened for health problems  Your healthcare provider will also give you advice on how to stay healthy  Write down your questions so you remember to ask them  Ask your healthcare provider how often you should have a wellness visit  What happens at a wellness visit:  Your healthcare provider will ask about your health, and your family history of health problems  This includes high blood pressure, heart disease, and cancer   He or she will ask if you have symptoms that concern you, if you smoke, and about your mood  You may also be asked about your intake of medicines, supplements, food, and alcohol  Any of the following may be done:  · Your weight  will be checked  Your height may also be checked so your body mass index (BMI) can be calculated  Your BMI shows if you are at a healthy weight  · Your blood pressure  and heart rate will be checked  Your temperature may also be checked  · Blood and urine tests  may be done  Blood tests may be done to check your cholesterol levels  Abnormal cholesterol levels increase your risk for heart disease and stroke  You may also need a blood or urine test to check for diabetes if you are at increased risk  Urine tests may be done to look for signs of an infection or kidney disease  · A physical exam  includes checking your heartbeat and lungs with a stethoscope  Your healthcare provider may also check your skin to look for sun damage  · Screening tests  may be recommended  A screening test is done to check for diseases that may not cause symptoms  The screening tests you may need depend on your age, gender, family history, and lifestyle habits  For example, colorectal screening may be recommended if you are 48years old or older  Screening tests you need if you are a woman:   · A Pap smear  is used to screen for cervical cancer  Pap smears are usually done every 3 to 5 years depending on your age  You may need them more often if you have had abnormal Pap smear test results in the past  Ask your healthcare provider how often you should have a Pap smear  · A mammogram  is an x-ray of your breasts to screen for breast cancer  Experts recommend mammograms every 2 years starting at age 48 years  You may need a mammogram at age 52 years or younger if you have an increased risk for breast cancer  Talk to your healthcare provider about when you should start having mammograms and how often you need them      Vaccines you may need:   · Get an influenza vaccine  every year  The influenza vaccine protects you from the flu  Several types of viruses cause the flu  The viruses change over time, so new vaccines are made each year  · Get a tetanus-diphtheria (Td) booster vaccine  every 10 years  This vaccine protects you against tetanus and diphtheria  Tetanus is a severe infection that may cause painful muscle spasms and lockjaw  Diphtheria is a severe bacterial infection that causes a thick covering in the back of your mouth and throat  · Get a human papillomavirus (HPV) vaccine  if you are female and aged 23 to 32 or male 23 to 24 and never received it  This vaccine protects you from HPV infection  HPV is the most common infection spread by sexual contact  HPV may also cause vaginal, penile, and anal cancers  · Get a pneumococcal vaccine  if you are aged 72 years or older  The pneumococcal vaccine is an injection given to protect you from pneumococcal disease  Pneumococcal disease is an infection caused by pneumococcal bacteria  The infection may cause pneumonia, meningitis, or an ear infection  · Get a shingles vaccine  if you are 60 or older, even if you have had shingles before  The shingles vaccine is an injection to protect you from the varicella-zoster virus  This is the same virus that causes chickenpox  Shingles is a painful rash that develops in people who had chickenpox or have been exposed to the virus  How to eat healthy:  My Plate is a model for planning healthy meals  It shows the types and amounts of foods that should go on your plate  Fruits and vegetables make up about half of your plate, and grains and protein make up the other half  A serving of dairy is included on the side of your plate  The amount of calories and serving sizes you need depends on your age, gender, weight, and height   Examples of healthy foods are listed below:  · Eat a variety of vegetables  such as dark green, red, and orange vegetables  You can also include canned vegetables low in sodium (salt) and frozen vegetables without added butter or sauces  · Eat a variety of fresh fruits , canned fruit in 100% juice, frozen fruit, and dried fruit  · Include whole grains  At least half of the grains you eat should be whole grains  Examples include whole-wheat bread, wheat pasta, brown rice, and whole-grain cereals such as oatmeal     · Eat a variety of protein foods such as seafood (fish and shellfish), lean meat, and poultry without skin (turkey and chicken)  Examples of lean meats include pork leg, shoulder, or tenderloin, and beef round, sirloin, tenderloin, and extra lean ground beef  Other protein foods include eggs and egg substitutes, beans, peas, soy products, nuts, and seeds  · Choose low-fat dairy products such as skim or 1% milk or low-fat yogurt, cheese, and cottage cheese  · Limit unhealthy fats  such as butter, hard margarine, and shortening  Exercise:  Exercise at least 30 minutes per day on most days of the week  Some examples of exercise include walking, biking, dancing, and swimming  You can also fit in more physical activity by taking the stairs instead of the elevator or parking farther away from stores  Include muscle strengthening activities 2 days each week  Regular exercise provides many health benefits  It helps you manage your weight, and decreases your risk for type 2 diabetes, heart disease, stroke, and high blood pressure  Exercise can also help improve your mood  Ask your healthcare provider about the best exercise plan for you  General health and safety guidelines:   · Do not smoke  Nicotine and other chemicals in cigarettes and cigars can cause lung damage  Ask your healthcare provider for information if you currently smoke and need help to quit  E-cigarettes or smokeless tobacco still contain nicotine  Talk to your healthcare provider before you use these products  · Limit alcohol    A drink of alcohol is 12 ounces of beer, 5 ounces of wine, or 1½ ounces of liquor  · Lose weight, if needed  Being overweight increases your risk of certain health conditions  These include heart disease, high blood pressure, type 2 diabetes, and certain types of cancer  · Protect your skin  Do not sunbathe or use tanning beds  Use sunscreen with a SPF 15 or higher  Apply sunscreen at least 15 minutes before you go outside  Reapply sunscreen every 2 hours  Wear protective clothing, hats, and sunglasses when you are outside  · Drive safely  Always wear your seatbelt  Make sure everyone in your car wears a seatbelt  A seatbelt can save your life if you are in an accident  Do not use your cell phone when you are driving  This could distract you and cause an accident  Pull over if you need to make a call or send a text message  · Practice safe sex  Use latex condoms if are sexually active and have more than one partner  Your healthcare provider may recommend screening tests for sexually transmitted infections (STIs)  · Wear helmets, lifejackets, and protective gear  Always wear a helmet when you ride a bike or motorcycle, go skiing, or play sports that could cause a head injury  Wear protective equipment when you play sports  Wear a lifejacket when you are on a boat or doing water sports  © Copyright [address] Information is for End User's use only and may not be sold, redistributed or otherwise used for commercial purposes  All illustrations and images included in CareNotes® are the copyrighted property of A D A M , Inc  or [facility] Maya   The above information is an  only  It is not intended as medical advice for individual conditions or treatments  Talk to your doctor, nurse or pharmacist before following any medical regimen to see if it is safe and effective for you

## 2021-06-30 NOTE — ASSESSMENT & PLAN NOTE
Loss of urine with coughing, sneezing, jumping since birth of her last child   Will trial Ditropan   Discussed Kegel exercises   Referral to pelvic floor PT and GYN

## 2021-07-13 ENCOUNTER — OFFICE VISIT (OUTPATIENT)
Dept: FAMILY MEDICINE CLINIC | Facility: CLINIC | Age: 36
End: 2021-07-13

## 2021-07-13 VITALS
HEART RATE: 71 BPM | WEIGHT: 251 LBS | BODY MASS INDEX: 44.47 KG/M2 | TEMPERATURE: 97 F | SYSTOLIC BLOOD PRESSURE: 110 MMHG | RESPIRATION RATE: 16 BRPM | OXYGEN SATURATION: 95 % | HEIGHT: 63 IN | DIASTOLIC BLOOD PRESSURE: 70 MMHG

## 2021-07-13 DIAGNOSIS — M54.50 ACUTE RIGHT-SIDED LOW BACK PAIN, UNSPECIFIED WHETHER SCIATICA PRESENT: Primary | ICD-10-CM

## 2021-07-13 PROCEDURE — 99214 OFFICE O/P EST MOD 30 MIN: CPT | Performed by: FAMILY MEDICINE

## 2021-07-13 RX ORDER — SENNOSIDES 8.6 MG
650 CAPSULE ORAL EVERY 8 HOURS PRN
Qty: 30 TABLET | Refills: 0 | Status: SHIPPED | OUTPATIENT
Start: 2021-07-13

## 2021-07-13 RX ORDER — PREDNISONE 20 MG/1
20 TABLET ORAL DAILY
Qty: 10 TABLET | Refills: 0 | Status: SHIPPED | OUTPATIENT
Start: 2021-07-13 | End: 2021-07-23

## 2021-07-13 RX ORDER — BACLOFEN 10 MG/1
10 TABLET ORAL 3 TIMES DAILY
Qty: 90 TABLET | Refills: 0 | Status: SHIPPED | OUTPATIENT
Start: 2021-07-13 | End: 2021-08-12

## 2021-07-13 NOTE — ASSESSMENT & PLAN NOTE
Morning of 06/22/2021 and experience pain on the right side of her lower back  She went to the emergency room and because of her complaint she had a CT scan done of the abdomen and pelvis with contrast   There were no abnormalities in the abdomen or the pelvis and were no kidney stones  Discharged home and to use ibuprofen, 600mg, evetry 6-8 hrs  Alternating ibuprofen with Tylenol was of no benefit, after the ibuprofen with no benefit  OV on 6/30/21 - discharged on muscle relaxant  At home, taking muscle relaxant and the ibuprofen - seemed to be getting a little better  07/12/21 - of the cough and yesterday morning patient felt something pop in her back since then her pain has been worsening  Patient rated the pain as 8/10 and the pain radiates down her right lower leg  The pain worsen with movement and methocarb Patient went to emergency room yesterday and was given valium  Patient was informed that she has sciatica  She was encourage to see her PCP to get MRI  Patient has not taken any the pain medication beside overcounter with Tylenol yesterday

## 2021-07-13 NOTE — PROGRESS NOTES
Assessment/Plan:    Right Sided Low back Pain with Sciatica     - Severe right sided low back pain after coughing  Pain radiates down the right leg  Worse with walking, climbing and bending over  Moderate tenderness on palpation of lower back  Full ROM and sensation intact in lower extremities bilateral  Reflexes intact  - continue with Tylenol 650 mg PRN for pain control  - DC Robaxin 500 mg, provides no relief  - start Baclofen 10 mg  - start Prednisolone 20 mg 1 tablet daily x 10 days  - refer to physical therapy   - MRI in 6 weeks if pain does not relief    Obesity   - Encourage exercise, diet and weight control  - Bariatric surgery referral future    No problem-specific Assessment & Plan notes found for this encounter  There are no diagnoses linked to this encounter  Subjective:      Patient ID: Tigre You is a 28 y o  female  HPI  : Worsening Lower back Pain    Vivian You is a 29 y/o Obese female with PMH of Asthma, Anxiety, depression, Bipolar 1, PTSD presents to the office coming of Lower back pain of 2 days duration  The patient described the pain as sharp, constant, 8/10, and does radiate down her right lower leg  Patient report that the pan became severe this morning after she cough and hear something pop in her back  Tylenol provides mild relief, walking, climbing stairs and bending exacerbate the pain  She visited the ED on 07/12/2021, she was examine and discharge on Tylenol PRN and Robaxin 500 mg   Lumbar x-ray on 06/30/2021 was negative for deformities, lesions or fracture  On 06/22/2021, patient was seen in the ED complaining of right sided flank pain that radiated to the groin  CT show no evidence of stone, hydronephrosis or lesion  UA show no evidence of infection  Patient was discharged on Tylenol PRN       The following portions of the patient's history were reviewed and updated as appropriate: allergies, current medications, past family history, past medical history, past social history, past surgical history and problem list    Review of Systems   Constitutional: Negative for chills and fever  HENT: Negative for ear pain and sore throat  Eyes: Negative for pain and visual disturbance  Respiratory: Negative for cough and shortness of breath  Cardiovascular: Negative for chest pain and palpitations  Gastrointestinal: Negative for abdominal pain and vomiting  Genitourinary: Negative for dysuria and hematuria  Musculoskeletal: Positive for back pain  Negative for arthralgias  Skin: Negative for color change and rash  Neurological: Negative for seizures, syncope, weakness and numbness  All other systems reviewed and are negative  Objective:      /70 (BP Location: Left arm, Patient Position: Sitting, Cuff Size: Large)   Pulse 71   Temp (!) 97 °F (36 1 °C) (Temporal)   Resp 16   Ht 5' 3" (1 6 m)   Wt 114 kg (251 lb)   LMP 07/01/2021 (Exact Date)   SpO2 95%   Breastfeeding No   BMI 44 46 kg/m²          Physical Exam  Constitutional:       Appearance: Normal appearance  She is obese  HENT:      Head: Normocephalic and atraumatic  Cardiovascular:      Rate and Rhythm: Normal rate and regular rhythm  Pulses: Normal pulses  Heart sounds: Normal heart sounds  Pulmonary:      Effort: Pulmonary effort is normal  No respiratory distress  Breath sounds: Normal breath sounds  No stridor  No wheezing or rhonchi  Chest:      Chest wall: No tenderness  Abdominal:      General: Bowel sounds are normal       Palpations: Abdomen is soft  Musculoskeletal:         General: Normal range of motion  Cervical back: Neck supple  Lumbar back: Spasms and tenderness present  Right lower leg: No edema  Skin:     General: Skin is warm and dry  Neurological:      General: No focal deficit present  Mental Status: She is alert and oriented to person, place, and time  Motor: Motor function is intact        Deep Tendon Reflexes: Reflex Scores:       Patellar reflexes are 2+ on the right side and 2+ on the left side  Achilles reflexes are 2+ on the right side and 2+ on the left side  Comments: Motor testing was normal in both lower extremities, including quadriceps, hamstrings, anterior peroneals, and calf muscles     Psychiatric:         Mood and Affect: Mood normal          Behavior: Behavior normal

## 2021-09-14 ENCOUNTER — OFFICE VISIT (OUTPATIENT)
Dept: DENTISTRY | Facility: CLINIC | Age: 36
End: 2021-09-14

## 2021-09-14 VITALS — TEMPERATURE: 95.9 F | SYSTOLIC BLOOD PRESSURE: 141 MMHG | HEART RATE: 106 BPM | DIASTOLIC BLOOD PRESSURE: 90 MMHG

## 2021-09-14 DIAGNOSIS — K08.109 EDENTULISM: Primary | ICD-10-CM

## 2021-09-14 NOTE — PROGRESS NOTES
Pt presents for Denture Adjustment  CC: my dentures feel like I'm wearing a athletic mouthguard, and the front teeth are to long  I keep bumping them when I try to take a drink  Clinical eval reveals anterior maxillary flange is thick, will reduced  Incisal edge show is approx  3mm at rest  Age of PT indicates 1-2mm show would be proper esthetics  eval of occlusion indicates minimal contact area on both arches  TX: reduced VDO increased balanced occlusion and allowed for reduction of the incisal edges  relieved anterior maxillary flange Pt states it feels much different and better on her upper lip     PT will wear dentures for a couple weeks and call if further adjusment is needed

## 2021-09-27 ENCOUNTER — APPOINTMENT (EMERGENCY)
Dept: RADIOLOGY | Facility: HOSPITAL | Age: 36
End: 2021-09-27
Payer: COMMERCIAL

## 2021-09-27 ENCOUNTER — HOSPITAL ENCOUNTER (EMERGENCY)
Facility: HOSPITAL | Age: 36
Discharge: HOME | End: 2021-09-27
Attending: EMERGENCY MEDICINE
Payer: COMMERCIAL

## 2021-09-27 VITALS
WEIGHT: 243.39 LBS | SYSTOLIC BLOOD PRESSURE: 150 MMHG | RESPIRATION RATE: 16 BRPM | HEART RATE: 107 BPM | TEMPERATURE: 98 F | OXYGEN SATURATION: 98 % | DIASTOLIC BLOOD PRESSURE: 97 MMHG | BODY MASS INDEX: 43.11 KG/M2

## 2021-09-27 DIAGNOSIS — S93.401A RIGHT ANKLE SPRAIN: Primary | ICD-10-CM

## 2021-09-27 PROCEDURE — 73610 X-RAY EXAM OF ANKLE: CPT

## 2021-09-27 PROCEDURE — 99284 EMERGENCY DEPT VISIT MOD MDM: CPT | Performed by: EMERGENCY MEDICINE

## 2021-09-27 PROCEDURE — 99282 EMERGENCY DEPT VISIT SF MDM: CPT

## 2021-09-27 RX ORDER — TRAMADOL HYDROCHLORIDE 50 MG/1
50 TABLET ORAL EVERY 8 HOURS PRN
Qty: 9 TABLET | Refills: 0 | Status: SHIPPED | OUTPATIENT
Start: 2021-09-27 | End: 2021-09-30

## 2021-10-12 ENCOUNTER — OFFICE VISIT (OUTPATIENT)
Dept: FAMILY MEDICINE CLINIC | Facility: CLINIC | Age: 36
End: 2021-10-12

## 2021-10-12 ENCOUNTER — TELEPHONE (OUTPATIENT)
Dept: FAMILY MEDICINE CLINIC | Facility: CLINIC | Age: 36
End: 2021-10-12

## 2021-10-12 DIAGNOSIS — B34.9 VIRAL INFECTION, UNSPECIFIED: Primary | ICD-10-CM

## 2021-10-12 PROCEDURE — U0005 INFEC AGEN DETEC AMPLI PROBE: HCPCS | Performed by: PHYSICIAN ASSISTANT

## 2021-10-12 PROCEDURE — 99213 OFFICE O/P EST LOW 20 MIN: CPT | Performed by: PHYSICIAN ASSISTANT

## 2021-10-12 PROCEDURE — U0003 INFECTIOUS AGENT DETECTION BY NUCLEIC ACID (DNA OR RNA); SEVERE ACUTE RESPIRATORY SYNDROME CORONAVIRUS 2 (SARS-COV-2) (CORONAVIRUS DISEASE [COVID-19]), AMPLIFIED PROBE TECHNIQUE, MAKING USE OF HIGH THROUGHPUT TECHNOLOGIES AS DESCRIBED BY CMS-2020-01-R: HCPCS | Performed by: PHYSICIAN ASSISTANT

## 2022-01-13 DIAGNOSIS — Z11.59 SCREENING FOR VIRAL DISEASE: Primary | ICD-10-CM

## 2022-01-13 PROCEDURE — U0005 INFEC AGEN DETEC AMPLI PROBE: HCPCS | Performed by: NURSE PRACTITIONER

## 2022-01-13 PROCEDURE — U0003 INFECTIOUS AGENT DETECTION BY NUCLEIC ACID (DNA OR RNA); SEVERE ACUTE RESPIRATORY SYNDROME CORONAVIRUS 2 (SARS-COV-2) (CORONAVIRUS DISEASE [COVID-19]), AMPLIFIED PROBE TECHNIQUE, MAKING USE OF HIGH THROUGHPUT TECHNOLOGIES AS DESCRIBED BY CMS-2020-01-R: HCPCS | Performed by: NURSE PRACTITIONER

## 2022-01-14 ENCOUNTER — TELEMEDICINE (OUTPATIENT)
Dept: FAMILY MEDICINE CLINIC | Facility: CLINIC | Age: 37
End: 2022-01-14

## 2022-01-14 DIAGNOSIS — J20.9 ACUTE BRONCHITIS, UNSPECIFIED ORGANISM: ICD-10-CM

## 2022-01-14 DIAGNOSIS — B34.9 VIRAL INFECTION, UNSPECIFIED: Primary | ICD-10-CM

## 2022-01-14 PROCEDURE — 99213 OFFICE O/P EST LOW 20 MIN: CPT | Performed by: NURSE PRACTITIONER

## 2022-01-14 RX ORDER — AZITHROMYCIN 250 MG/1
TABLET, FILM COATED ORAL
Qty: 6 TABLET | Refills: 0 | Status: SHIPPED | OUTPATIENT
Start: 2022-01-14 | End: 2022-01-19

## 2022-01-14 NOTE — PROGRESS NOTES
COVID-19 Outpatient Progress Note    Assessment/Plan:    Problem List Items Addressed This Visit     None      Visit Diagnoses     Viral infection, unspecified    -  Primary    Acute bronchitis, unspecified organism        Relevant Medications    azithromycin (Zithromax) 250 mg tablet      ·  Supportive measures discussed at length: Maintain hydration by drinking small amounts of clear fluids frequently, then soft diet, and then advance diet as tolerated  May use OTC Imodium if desired for any diarrhea  Symptomatic therapy suggested: rest, increase fluids, use mist of vaporizer prn, OTC acetaminophen, antihistamine-decongestant of choice, cough suppressant of choice and call prn if symptoms persist or worsen  Call if symptoms worsen, high fever, severe weakness or fainting, increased abdominal pain, blood in stool or vomit, or failure to improve in 2-3 days  · Smoker, phlegm, cough, SOB - will empirically Rx azithromycin  Disposition:     I have spent 15 minutes directly with the patient  Greater than 50% of this time was spent in counseling/coordination of care regarding: prognosis, risks and benefits of treatment options, instructions for management, patient and family education, importance of treatment compliance, risk factor reductions and impressions  Encounter provider GISSELLE Balderas    Provider located at [address] [address] [phone]    Recent Visits  No visits were found meeting these conditions  Showing recent visits within past 7 days and meeting all other requirements  Today's Visits  Date Type Provider Dept   01/14/22 Telemedicine GISSELLE Balderas   Showing today's visits and meeting all other requirements  Future Appointments  No visits were found meeting these conditions    Showing future appointments within next 150 days and meeting all other requirements     This virtual check-in was done via [facility] and patient was informed that this is a secure, HIPAA-compliant platform  She agrees to proceed  Patient agrees to participate in a virtual check in via telephone or video visit instead of presenting to the office to address urgent/immediate medical needs  Patient is aware this is a billable service  After connecting through [facility], the patient was identified by name and date of birth  Vivian You was informed that this was a telemedicine visit and that the exam was being conducted confidentially over secure lines  My office door was closed  No one else was in the room  Vivian You acknowledged consent and understanding of privacy and security of the telemedicine visit  I informed the patient that I have reviewed her record in Epic and presented the opportunity for her to ask any questions regarding the visit today  The patient agreed to participate  Verification of patient location:  Patient is located in the following state in which I hold an active license: [place]    Subjective:   Nola You is a 39 y o  female who is concerned about COVID-19   Patient's symptoms include fever, chills, fatigue, malaise, nasal congestion, rhinorrhea, sore throat, cough, myalgias and headache      - Date of symptom onset: 1/10/2022      COVID-19 vaccination status: Fully vaccinated with booster    Exposure:   Contact with a person who is under investigation (PUI) for or who is positive for COVID-19 within the last 14 days?: No    Hospitalized recently for fever and/or lower respiratory symptoms?: No      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 (this may include long-term care, correctional and group home facilities; homeless shelters; assisted-living facilities and group homes ): Yes      Resident in a special setting where the risk of COVID-19 transmission may be high? (this may include long-term care, correctional and half-way facilities; homeless shelters; assisted-living facilities and group homes ): Yes      Lab Results   Component Value Date    SARSCOV2 Negative 10/12/2021    Sherron Mac Not Detected 03/26/2020     Past Medical History:   Diagnosis Date    Anxiety     Bipolar 1 disorder ([address] )     Depression     IBS (irritable bowel syndrome)     Insomnia     Migraine     PTSD (post-traumatic stress disorder)      Past Surgical History:   Procedure Laterality Date    ANKLE HARDWARE REMOVAL      ANKLE SURGERY      CHOLECYSTECTOMY     [address]  2011    metal placed from broken jaw    ORIF MANDIBULAR FRACTURE Bilateral 6/3/2019    Procedure: [address] FROM LEFT MANDIBLE ALVEOLAR RIDGE;  Surgeon: Kinza Potter DMD;  Location: [facility];  Service: Maxillofacial    OTHER SURGICAL HISTORY      unlisted craniofacial and maxillofacial procedure     Current Outpatient Medications   Medication Sig Dispense Refill    acetaminophen (TYLENOL) 650 mg CR tablet Take 1 tablet (650 mg total) by mouth every 8 (eight) hours as needed for mild pain 30 tablet 0    albuterol (Ventolin HFA) 90 mcg/act inhaler Inhale 2 puffs every 6 (six) hours as needed for wheezing 18 g 2    azithromycin (Zithromax) 250 mg tablet Take 2 tablets (500 mg total) by mouth daily for 1 day, THEN 1 tablet (250 mg total) daily for 4 days   6 tablet 0    baclofen 10 mg tablet Take 1 tablet (10 mg total) by mouth 3 (three) times a day 90 tablet 0    butalbital-acetaminophen-caffeine (FIORICET,ESGIC) -40 mg per tablet Take 1 tablet by mouth every 6 (six) hours as needed for headaches or migraine (Patient not taking: Reported on 2/12/2020) 10 tablet 0    clonazePAM (KlonoPIN) 0 5 mg tablet       cloNIDine (CATAPRES) 0 1 mg tablet  (Patient not taking: Reported on 6/22/2021)      CLONIDINE HCL PO Take by mouth daily at bedtime as needed (Patient not taking: Reported on 6/22/2021)      hydrOXYzine HCL (ATARAX) 25 mg tablet Take 25 mg by mouth every 6 (six) hours as needed for itching      hydrOXYzine pamoate (VISTARIL) 25 mg capsule       methocarbamol (ROBAXIN) 500 mg tablet Take 1 tablet (500 mg total) by mouth 3 (three) times a day as needed for muscle spasms 40 tablet 0    oxybutynin (DITROPAN-XL) 5 mg 24 hr tablet Take 1 tablet (5 mg total) by mouth daily 30 tablet 0    propranolol (INDERAL) 10 mg tablet Take 10 mg by mouth 3 (three) times a day as needed        Ziprasidone HCl (GEODON PO) Take by mouth 2 (two) times a day (Patient not taking: Reported on 6/30/2021)       No current facility-administered medications for this visit  Allergies   Allergen Reactions    Codeine GI Intolerance    Fentanyl      Flushing    Penicillins Headache, Other (See Comments) and Vomiting     Severe vomitting      Naproxen Rash    Wellbutrin [Bupropion] Rash       Review of Systems   Constitutional: Positive for chills, fatigue and fever  HENT: Positive for congestion, rhinorrhea and sore throat  Respiratory: Positive for cough  Musculoskeletal: Positive for myalgias  Neurological: Positive for headaches  All other systems reviewed and are negative  Objective: There were no vitals filed for this visit  Physical Exam  Constitutional:       General: She is not in acute distress  HENT:      Nose: Congestion and rhinorrhea present  Eyes:      General: Allergic shiner present  Extraocular Movements: Extraocular movements intact  Pulmonary:      Effort: Pulmonary effort is normal  No respiratory distress  Comments: Cough during televideo   Musculoskeletal:      Cervical back: Normal range of motion  Neurological:      Mental Status: She is alert  Psychiatric:         Behavior: Behavior normal          Thought Content:  Thought content normal          VIRTUAL VISIT DISCLAIMER    Vivian You verbally agrees to participate in Virtual Care Services  Pt is aware that [employer] could be limited without vital signs or the ability to perform a full hands-on physical exam  Vivian A Day understands she or the provider may request at any time to terminate the video visit and request the patient to seek care or treatment in person

## 2022-01-14 NOTE — LETTER
January 14, 2022     Patient: Kasandra You   YOB: 1985   Date of Visit: 1/14/2022       To Whom it May Concern:    Sukhjinder You is under my professional care  She was seen in my office on 1/14/2022  She has one or more symptoms consistent with a viral illness  She is being tested for Covid and Flu by PCR for better accuracy  Per CDC recommendation: She is to remain in quarantine until result of Covid test   She may discontinue quarantine  after receiving a negative test result  If positive, must remain in quarantine for 5 days from positive test   It also highly likely she may have the flu or other viral illness in which case, and based on my assessment of her current condition, she may be excused from work for January 15 and 16th, 2022  If you have any questions or concerns, please don't hesitate to call           Sincerely,          GISSELLE Smith        CC: No Recipients

## 2022-03-30 DIAGNOSIS — J45.20 MILD INTERMITTENT ASTHMA WITHOUT COMPLICATION: ICD-10-CM

## 2022-03-30 RX ORDER — ALBUTEROL SULFATE 90 UG/1
2 AEROSOL, METERED RESPIRATORY (INHALATION) EVERY 6 HOURS PRN
Qty: 18 G | Refills: 0 | Status: SHIPPED | OUTPATIENT
Start: 2022-03-30

## 2022-09-27 ENCOUNTER — OFFICE VISIT (OUTPATIENT)
Dept: FAMILY MEDICINE CLINIC | Facility: CLINIC | Age: 37
End: 2022-09-27

## 2022-09-27 VITALS
RESPIRATION RATE: 16 BRPM | OXYGEN SATURATION: 98 % | HEART RATE: 72 BPM | DIASTOLIC BLOOD PRESSURE: 80 MMHG | WEIGHT: 216 LBS | TEMPERATURE: 98.7 F | BODY MASS INDEX: 38.26 KG/M2 | SYSTOLIC BLOOD PRESSURE: 132 MMHG

## 2022-09-27 DIAGNOSIS — G89.29 CHRONIC RIGHT-SIDED LOW BACK PAIN WITH SCIATICA, SCIATICA LATERALITY UNSPECIFIED: Primary | ICD-10-CM

## 2022-09-27 DIAGNOSIS — M54.41 ACUTE RIGHT-SIDED LOW BACK PAIN WITH RIGHT-SIDED SCIATICA: ICD-10-CM

## 2022-09-27 DIAGNOSIS — M54.40 CHRONIC RIGHT-SIDED LOW BACK PAIN WITH SCIATICA, SCIATICA LATERALITY UNSPECIFIED: Primary | ICD-10-CM

## 2022-09-27 PROCEDURE — 99214 OFFICE O/P EST MOD 30 MIN: CPT | Performed by: FAMILY MEDICINE

## 2022-09-27 RX ORDER — METHOCARBAMOL 500 MG/1
500 TABLET, FILM COATED ORAL 3 TIMES DAILY PRN
Qty: 40 TABLET | Refills: 0 | Status: SHIPPED | OUTPATIENT
Start: 2022-09-27

## 2022-09-27 RX ORDER — IBUPROFEN 600 MG/1
600 TABLET ORAL EVERY 8 HOURS PRN
Qty: 30 TABLET | Refills: 0 | Status: SHIPPED | OUTPATIENT
Start: 2022-09-27

## 2022-09-27 NOTE — PROGRESS NOTES
Name: Bhumika You      : 1985      MRN: [id number]  Encounter Provider: [address]  Encounter Date: 2022   Encounter department: [address] [place] [address]     1  Chronic right-sided low back pain with sciatica, sciatica laterality unspecified  Assessment & Plan:  -Acute on chronic low back pain  -No red flags  -Will send to physical therapy  -Will check spine xray  -Recommend Tylenol 1000 mg q 8, NSAID, heating pads  and muscle relaxant    Orders:  -     XR spine lumbar minimum 4 views non injury; Future; Expected date: 2022  -     Ambulatory Referral to Physical Therapy; Future  -     ibuprofen (MOTRIN) 600 mg tablet; Take 1 tablet (600 mg total) by mouth every 8 (eight) hours as needed for mild pain    2  Acute right-sided low back pain with right-sided sciatica  -     methocarbamol (ROBAXIN) 500 mg tablet; Take 1 tablet (500 mg total) by mouth 3 (three) times a day as needed for muscle spasms         Subjective       40year-old female with a history of chronic low back pain and sciatica presents today for   Worsening back pain  -Exacerbated a few days ago while she was at work  -She Is a  and pushes cart all day  -She Reports tightness and back spasm  -Took motrin/icy hot/heating pad with some relieve   -No bowel or bladder incontinence    Review of Systems   Constitutional: Negative for chills, fatigue and fever  Respiratory: Negative for shortness of breath  Cardiovascular: Negative for chest pain  Gastrointestinal: Negative for nausea and vomiting  Genitourinary: Negative for dysuria, hematuria and urgency  Musculoskeletal: Positive for back pain  Negative for gait problem  Skin: Negative for rash  Neurological: Negative for dizziness and headaches         Current Outpatient Medications on File Prior to Visit   Medication Sig    acetaminophen (TYLENOL) 650 mg CR tablet Take 1 tablet (650 mg total) by mouth every 8 (eight) hours as needed for mild pain    albuterol (Ventolin HFA) 90 mcg/act inhaler Inhale 2 puffs every 6 (six) hours as needed for wheezing    baclofen 10 mg tablet Take 1 tablet (10 mg total) by mouth 3 (three) times a day    butalbital-acetaminophen-caffeine (FIORICET,ESGIC) -40 mg per tablet Take 1 tablet by mouth every 6 (six) hours as needed for headaches or migraine (Patient not taking: Reported on 2/12/2020)    clonazePAM (KlonoPIN) 0 5 mg tablet     cloNIDine (CATAPRES) 0 1 mg tablet  (Patient not taking: Reported on 6/22/2021)    CLONIDINE HCL PO Take by mouth daily at bedtime as needed (Patient not taking: Reported on 6/22/2021)    hydrOXYzine HCL (ATARAX) 25 mg tablet Take 25 mg by mouth every 6 (six) hours as needed for itching    hydrOXYzine pamoate (VISTARIL) 25 mg capsule     oxybutynin (DITROPAN-XL) 5 mg 24 hr tablet Take 1 tablet (5 mg total) by mouth daily    propranolol (INDERAL) 10 mg tablet Take 10 mg by mouth 3 (three) times a day as needed      Ziprasidone HCl (GEODON PO) Take by mouth 2 (two) times a day (Patient not taking: Reported on 6/30/2021)    [DISCONTINUED] methocarbamol (ROBAXIN) 500 mg tablet Take 1 tablet (500 mg total) by mouth 3 (three) times a day as needed for muscle spasms       Objective     /80 (BP Location: Left arm, Patient Position: Sitting, Cuff Size: Standard)   Pulse 72   Temp 98 7 °F (37 1 °C) (Temporal)   Resp 16   Wt 98 kg (216 lb)   LMP 09/08/2022 (Approximate)   SpO2 98%   Breastfeeding No   BMI 38 26 kg/m²     Physical Exam  Constitutional:       General: She is not in acute distress  Appearance: Normal appearance  She is well-developed  She is obese  She is not ill-appearing, toxic-appearing or diaphoretic  HENT:      Head: Normocephalic and atraumatic        Right Ear: External ear normal       Left Ear: External ear normal       Nose: Nose normal    Eyes:      Extraocular Movements: Extraocular movements intact  Neck:      Thyroid: No thyromegaly  Vascular: No JVD  Trachea: No tracheal deviation  Cardiovascular:      Rate and Rhythm: Normal rate and regular rhythm  Heart sounds: Normal heart sounds  No murmur heard  No friction rub  No gallop  Pulmonary:      Effort: Pulmonary effort is normal  No respiratory distress  Breath sounds: Normal breath sounds  No stridor  No wheezing  Abdominal:      General: Bowel sounds are normal  There is no distension  Palpations: Abdomen is soft  There is no mass  Tenderness: There is no abdominal tenderness  There is no guarding  Musculoskeletal:      Cervical back: Normal range of motion  Lumbar back: Spasms and tenderness present  No bony tenderness  Normal range of motion  Positive right straight leg raise test and positive left straight leg raise test    Skin:     General: Skin is warm  Capillary Refill: Capillary refill takes less than 2 seconds  Findings: No rash  Neurological:      General: No focal deficit present  Mental Status: She is alert and oriented to person, place, and time  Cranial Nerves: No cranial nerve deficit  Sensory: Sensation is intact  No sensory deficit  Motor: No weakness        Coordination: Coordination normal       Deep Tendon Reflexes: Reflexes normal    Psychiatric:         Behavior: Behavior normal        [address] [place] [address]

## 2022-09-27 NOTE — ASSESSMENT & PLAN NOTE
-Acute on chronic low back pain  -No red flags  -Will send to physical therapy  -Will check spine xray  -Recommend Tylenol 1000 mg q 8, NSAID, heating pads  and muscle relaxant

## 2022-12-28 ENCOUNTER — HOSPITAL ENCOUNTER (EMERGENCY)
Facility: HOSPITAL | Age: 37
Discharge: HOME | End: 2022-12-28
Attending: EMERGENCY MEDICINE

## 2022-12-28 ENCOUNTER — APPOINTMENT (EMERGENCY)
Dept: CT IMAGING | Facility: HOSPITAL | Age: 37
End: 2022-12-28

## 2022-12-28 ENCOUNTER — APPOINTMENT (EMERGENCY)
Dept: RADIOLOGY | Facility: HOSPITAL | Age: 37
End: 2022-12-28

## 2022-12-28 VITALS
DIASTOLIC BLOOD PRESSURE: 80 MMHG | SYSTOLIC BLOOD PRESSURE: 120 MMHG | WEIGHT: 202.38 LBS | OXYGEN SATURATION: 98 % | HEART RATE: 70 BPM | BODY MASS INDEX: 35.85 KG/M2 | RESPIRATION RATE: 16 BRPM | TEMPERATURE: 98.8 F

## 2022-12-28 DIAGNOSIS — M79.604 RIGHT LEG PAIN: ICD-10-CM
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
## 2022-12-28 DIAGNOSIS — N63.0 BREAST NODULE: ICD-10-CM

## 2022-12-28 DIAGNOSIS — T74.91XA DOMESTIC VIOLENCE OF ADULT, INITIAL ENCOUNTER: ICD-10-CM

## 2022-12-28 LAB
ANION GAP SERPL CALCULATED.3IONS-SCNC: 7 MMOL/L (ref 4–13)
BACTERIA UR QL AUTO: ABNORMAL /HPF
BILIRUB UR QL STRIP: NEGATIVE
BUN SERPL-MCNC: 6 MG/DL (ref 5–25)
CALCIUM SERPL-MCNC: 9 MG/DL (ref 8.3–10.1)
CHLORIDE SERPL-SCNC: 106 MMOL/L (ref 96–108)
CLARITY UR: CLEAR
CO2 SERPL-SCNC: 25 MMOL/L (ref 21–32)
COLOR UR: YELLOW
CREAT SERPL-MCNC: 0.67 MG/DL (ref 0.6–1.3)
EXT PREGNANCY TEST URINE: NEGATIVE
EXT. CONTROL: NORMAL
GFR SERPL CREATININE-BSD FRML MDRD: 112 ML/MIN/1.73SQ M
GLUCOSE SERPL-MCNC: 110 MG/DL (ref 65–140)
GLUCOSE UR STRIP-MCNC: NEGATIVE MG/DL
HGB UR QL STRIP.AUTO: ABNORMAL
KETONES UR STRIP-MCNC: NEGATIVE MG/DL
LEUKOCYTE ESTERASE UR QL STRIP: NEGATIVE
MUCOUS THREADS UR QL AUTO: ABNORMAL
NITRITE UR QL STRIP: NEGATIVE
NON-SQ EPI CELLS URNS QL MICRO: ABNORMAL /HPF
PH UR STRIP.AUTO: 8.5 [PH] (ref 4.5–8)
POTASSIUM SERPL-SCNC: 3.9 MMOL/L (ref 3.5–5.3)
PROT UR STRIP-MCNC: ABNORMAL MG/DL
RBC #/AREA URNS AUTO: ABNORMAL /HPF
SODIUM SERPL-SCNC: 138 MMOL/L (ref 135–147)
SP GR UR STRIP.AUTO: 1.02 (ref 1–1.03)
UROBILINOGEN UR QL STRIP.AUTO: 0.2 E.U./DL
WBC #/AREA URNS AUTO: ABNORMAL /HPF

## 2022-12-28 RX ORDER — ACETAMINOPHEN 325 MG/1
975 TABLET ORAL ONCE
Status: COMPLETED | OUTPATIENT
Start: 2022-12-28 | End: 2022-12-28

## 2022-12-28 RX ADMIN — ACETAMINOPHEN 975 MG: 325 TABLET, FILM COATED ORAL at 10:09

## 2022-12-28 RX ADMIN — IOHEXOL 100 ML: 350 INJECTION, SOLUTION INTRAVENOUS at 11:02
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
## 2022-12-28 NOTE — LETTER
[address] [place] [address]  [address] [address]      January 9, 2023    MRN: [id number]     Phone: [phone]     Dear Ms  Day,    You recently had a(n)  performed on  at  [address] [place] [address] that was requested by Alexa Garsia MD  The study was reviewed by a radiologist, which is a physician who specializes in medical imaging  The radiologist issued a report describing his or her findings  In that report there was a finding that the radiologist felt warranted further discussion with your health care provider and that discussion would be beneficial to you  The results were sent to Alexa Garsia MD on    We recommend that you contact Alexa Garsia MD at  or set up an appointment to discuss the results of the imaging test  If you have already heard from Alexa Garsia MD regarding the results of your study, you can disregard this letter  This letter is not meant to alarm you, but intended to encourage you to follow-up on your results with the provider that sent you for the imaging study  In addition, we have enclosed answers to frequently asked questions by other patients who have also received a letter to review results with their health care provider (see page two)  Thank you for choosing [facility] (?????) [facility] for your medical imaging needs  FREQUENTLY ASKED QUESTIONS    1  Why am I receiving this letter? [facility] requires us to notify patients who have findings on imaging exams that may require more testing or follow-up with a health professional within the next 3 months  2  How serious is the finding on the imaging test?  This letter is sent to all patients who may need follow-up or more testing within the next 3 months    Receiving this letter does not necessarily mean you have a life-threatening imaging finding or disease  Recommendations in the radiologist’s imaging report are general in nature and it is up to your healthcare provider to say whether those recommendations make sense for your situation  You are strongly encouraged to talk to your health care provider about the results and ask whether additional steps need to be taken  3  Where can I get a copy of the final report for my recent radiology exam?  To get a full copy of the report you can access your records online at [email or web address] or please contact [facility] Department at [phone] Monday through Friday between 8 am and 6 pm          4  What do I need to do now? Please contact your health care provider who requested the imaging study to discuss what further actions (if any) are needed  You may have already heard from (your ordering provider) in regard to this test in which case you can disregard this letter  NOTICE IN ACCORDANCE WITH THE [place] STATE “PATIENT TEST RESULT INFORMATION ACT OF 2018”    You are receiving this notice as a result of a determination by your diagnostic imaging service that further discussions of your test results are warranted and would be beneficial to you  The complete results of your test or tests have been or will be sent to the health care practitioner that ordered the test or tests  It is recommended that you contact your health care practitioner to discuss your results as soon as possible
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
## 2022-12-28 NOTE — Clinical Note
Sen Valadez Kenyatta was seen and treated in our emergency department on 12/28/2022  No restrictions            Diagnosis:     Vivian    She may return on this date: 12/31/2022         If you have any questions or concerns, please don't hesitate to call        Kelley Richmond DO    ______________________________           _______________          _______________  Hospital Representative                              Date                                Time

## 2023-01-11 ENCOUNTER — OFFICE VISIT (OUTPATIENT)
Dept: OBGYN CLINIC | Facility: CLINIC | Age: 38
End: 2023-01-11

## 2023-01-11 VITALS
HEIGHT: 63 IN | SYSTOLIC BLOOD PRESSURE: 127 MMHG | WEIGHT: 202 LBS | BODY MASS INDEX: 35.79 KG/M2 | DIASTOLIC BLOOD PRESSURE: 80 MMHG | HEART RATE: 67 BPM

## 2023-01-11 DIAGNOSIS — N63.0 BREAST MASS IN FEMALE: Primary | ICD-10-CM

## 2023-01-11 NOTE — PATIENT INSTRUCTIONS
Schedule bilateral breast ultrasounds and mammogram  Schedule auual GYN exam and PAP  Call with needs or concerns    COVID-19 Instructions    If you are having any of the following:  Cough   Shortness of breath   Fever  If traveled within past 2 weeks internationally or to high risk [place] states  Or been in contact with someone that has     Please call either:   Your PCP office  [phone], option [phone]    They will screen you over the phone and direct you to the nearest appropriate testing location    DO NOT go to your PCP or OB office without calling first       Luzma Soni

## 2023-01-11 NOTE — PROGRESS NOTES
Assessment/Plan:         Diagnoses and all orders for this visit:    Breast mass in female  -     Mammo diagnostic bilateral w 3d & cad; Future  -     US BREAST BILATERAL LIMITED (DIAGNOSTIC); Future      Plan  Schedule bilateral breast ultrasounds and mammogram  Schedule auual GYN exam and PAP  Call with needs or concerns  Pt verbalized understanding of all discussed  Subjective:      Patient ID: Erwin You is a 40 y o  female  HPI   Pt presents as a [birth year] for a breast mass noted on a CAT scan after she fell  Last PAP was 11/3/2015 ASCUS, HPV 16 was positive  1/27/2016 colpo noted reactive changes and chronic inflammation    Explained < 1 cm Breast masses are noted in both breast explained breast U/S's and mammogram would be ordered    Depression Screening Follow-up Plan: Patient's depression screening was negative with a PHQ-2 score of 2  Their PHQ-9 score was 8  Clinically patient does not have depression  No treatment is required  The following portions of the patient's history were reviewed and updated as appropriate: allergies, current medications, past family history, past medical history, past social history, past surgical history and problem list     Review of Systems      Pertinent items are note in the HPI      Objective:      /80   Pulse 67   Ht 5' 3" (1 6 m)   Wt 91 6 kg (202 lb)   LMP 12/23/2022   BMI 35 78 kg/m²          Physical Exam  Vitals reviewed  Constitutional:       Appearance: Normal appearance  Eyes:      General:         Right eye: No discharge  Left eye: No discharge  Pulmonary:      Effort: Pulmonary effort is normal  No respiratory distress  Musculoskeletal:         General: Normal range of motion  Cervical back: Normal range of motion  Neurological:      Mental Status: She is alert and oriented to person, place, and time     Psychiatric:         Mood and Affect: Mood normal          Behavior: Behavior normal          Thought Content: Thought content normal        Negative cough or SOB  R breast < 1 cm firm lump @ 9 o'clock, NT, negative discharge or dimpling  L breast < 1 cm firm breast masses at 1 o'clock and 3 o'clock, negative discharge or dimpling

## 2023-01-31 ENCOUNTER — ANNUAL EXAM (OUTPATIENT)
Dept: OBGYN CLINIC | Facility: CLINIC | Age: 38
End: 2023-01-31

## 2023-01-31 VITALS
DIASTOLIC BLOOD PRESSURE: 74 MMHG | HEIGHT: 63 IN | WEIGHT: 199.2 LBS | HEART RATE: 66 BPM | SYSTOLIC BLOOD PRESSURE: 117 MMHG | BODY MASS INDEX: 35.3 KG/M2

## 2023-01-31 DIAGNOSIS — Z13.31 POSITIVE DEPRESSION SCREENING: ICD-10-CM

## 2023-01-31 DIAGNOSIS — Z01.419 ENCOUNTER FOR ANNUAL ROUTINE GYNECOLOGICAL EXAMINATION: Primary | ICD-10-CM

## 2023-01-31 DIAGNOSIS — Z11.3 SCREEN FOR STD (SEXUALLY TRANSMITTED DISEASE): ICD-10-CM

## 2023-01-31 DIAGNOSIS — Z30.011 ENCOUNTER FOR INITIAL PRESCRIPTION OF CONTRACEPTIVE PILLS: ICD-10-CM

## 2023-01-31 RX ORDER — ACETAMINOPHEN AND CODEINE PHOSPHATE 120; 12 MG/5ML; MG/5ML
1 SOLUTION ORAL DAILY
Qty: 28 TABLET | Refills: 3 | Status: SHIPPED | OUTPATIENT
Start: 2023-01-31

## 2023-01-31 NOTE — PATIENT INSTRUCTIONS
Keep mammogram and breast ultrasound appointments  Start birth control pills the Sunday of your next period  PAP and STD results can take up to 2 weeks  Remember safe sex and condom use  Call with needs or concerns  Return in 1 year annual GYN exam  Return in 3 months to follow up birth control pill start    COVID-19 Instructions    If you are having any of the following:  Cough   Shortness of breath   Fever  If traveled within past 2 weeks internationally or to high risk [place] states  Or been in contact with someone that has     Please call either:   Your PCP office  [phone], option [phone]    They will screen you over the phone and direct you to the nearest appropriate testing location    DO NOT go to your PCP or OB office without calling first       Aura Larkin

## 2023-01-31 NOTE — LETTER
2023    [facility]  [phone]        2023    To Vivian You  : 1985      This letter is to advise you that your recent CULTURES for gonorrhea and chlamydia were reviewed by me and are NORMAL  Please contact the office for an appointment if you have any additional concerns      GISSELLE Mike

## 2023-01-31 NOTE — PROGRESS NOTES
Annual Exam    Assessment   1  Encounter for annual routine gynecological examination  Liquid-based pap, screening      2  Screen for STD (sexually transmitted disease)  Chlamydia/GC amplified DNA by PCR    RPR    HIV 1/2 AG/AB w Reflex [facility] for 2 yr old and above    Hepatitis C antibody    Hepatitis B surface antigen      3  Positive depression screening  Ambulatory Referral to Social Work Care Management Program      4  Encounter for initial prescription of contraceptive pills  norethindrone (Ortho Micronor) 0 35 MG tablet        well woman       Plan       All questions answered  Breast self exam technique reviewed and patient encouraged to perform self-exam monthly  Contraception: none  Discussed healthy lifestyle modifications  Follow up in 1 year  Mammogram   breast U/S     Patient Instructions   Keep mammogram and breast ultrasound appointments  Start birth control pills the Sunday of your next period  PAP and STD results can take up to 2 weeks  Remember safe sex and condom use  Call with needs or concerns  Return in 1 year annual GYN exam  Return in 3 months to follow up birth control pill start  Pt verbalized understanding of all discussed  Subjective      Vivian A Day is a 40 y o  No obstetric history on file  female who presents for annual well woman exam  Periods are regular every 28-30 days, lasting 6 days  No intermenstrual bleeding, spotting, or discharge  1 partner x 16 years, denies domestic violence    Depression Screening Follow-up Plan: Patient's depression screening was positive with a PHQ-2 score of 4  Their PHQ-9 score was 12  Denies suicidal thoughts, would like a call from [facility] from social work to discuss a therapist    BMI Counseling: Body mass index is 35 29 kg/m²   The BMI is above normal  Nutrition recommendations include reducing portion sizes, decreasing overall calorie intake, consuming healthier snacks, moderation in carbohydrate intake and increasing intake of lean protein  Exercise recommendations include moderate aerobic physical activity for 150 minutes/week  Tobacco Cessation Counseling: Tobacco cessation counseling and education was provided  The patient is sincerely urged to quit consumption of tobacco  She is not ready to quit tobacco  The numerous health risks of tobacco consumption were discussed  Pt will ask for assistance if she would like assistance to quit  Current contraception: none, would like to start a progesterone only birth control pill, safe and effective use probided  History of abnormal Pap smear: yes - , ASCUS, HPV 16 positive, reactive changes on colpo, no F/U  Family history of uterine or ovarian cancer: no  Regular self breast exam: yes  History of abnormal mammogram: scheduled 2023  Family history of breast cancer: no  History of abnormal lipids: unknwn  Menstrual History:  OB History        4    Para   1    Term   1            AB   3    Living   1       SAB   2    IAB   1    Ectopic        Multiple        Live Births   1                Menarche age: 15  Patient's last menstrual period was 2023 (exact date)  The following portions of the patient's history were reviewed and updated as appropriate: allergies, current medications, past family history, past medical history, past social history, past surgical history and problem list     Review of Systems  Pertinent items are noted in HPI        Objective      /74   Pulse 66   Ht 5' 3" (1 6 m)   Wt 90 4 kg (199 lb 3 2 oz)   LMP 2023 (Exact Date)   BMI 35 29 kg/m²     General: alert and oriented, in no acute distress, alert, appears stated age and cooperative   Heart: regular rate and rhythm, S1, S2 normal, no murmur, click, rub or gallop   Lungs: clear to auscultation bilaterally, WNL respiratory effort, negative cough or SOB    Thyroid: Negative masses   Abdomen: soft, non-tender, without masses or organomegaly   Vulva: normal   Vagina: normal mucosa   Cervix: no bleeding following Pap, no cervical motion tenderness and no lesions   Uterus: normal size, non-tender, normal shape and consistency   Adnexa: normal adnexa   Urethra: normal   Breasts: Deferred, exam 1/11/2023, has Bilateral U/S and Dx mammogram 2/23/2023 scheduled

## 2023-02-01 LAB
HPV HR 12 DNA CVX QL NAA+PROBE: NEGATIVE
HPV16 DNA CVX QL NAA+PROBE: POSITIVE
HPV18 DNA CVX QL NAA+PROBE: NEGATIVE

## 2023-02-02 LAB
C TRACH DNA SPEC QL NAA+PROBE: NEGATIVE
N GONORRHOEA DNA SPEC QL NAA+PROBE: NEGATIVE

## 2023-02-08 ENCOUNTER — TELEPHONE (OUTPATIENT)
Dept: OBGYN CLINIC | Facility: CLINIC | Age: 38
End: 2023-02-08

## 2023-02-08 PROBLEM — R87.613 PAP SMEAR ABNORMALITY OF CERVIX WITH HGSIL: Status: ACTIVE | Noted: 2023-02-08

## 2023-02-08 LAB
LAB AP GYN PRIMARY INTERPRETATION: ABNORMAL
Lab: ABNORMAL
PATH INTERP SPEC-IMP: ABNORMAL

## 2023-02-08 NOTE — TELEPHONE ENCOUNTER
----- Message from Farooq sent at 2/8/2023  2:03 PM EST -----  Please schedule colpo, pt is aware of HGSIL PAP with HPV 16 positive  Pt had a colpo in the past and is aware of procedure

## 2023-02-08 NOTE — TELEPHONE ENCOUNTER
Called Vivian and explained HGSIL on PAP and HPV positive and need for colpo, pt states she had a colpo in the past   Pt verbalized understanding of all discussed

## 2023-02-23 ENCOUNTER — HOSPITAL ENCOUNTER (OUTPATIENT)
Dept: MAMMOGRAPHY | Facility: CLINIC | Age: 38
End: 2023-02-23

## 2023-02-23 ENCOUNTER — PROCEDURE VISIT (OUTPATIENT)
Dept: OBGYN CLINIC | Facility: CLINIC | Age: 38
End: 2023-02-23

## 2023-02-23 VITALS
SYSTOLIC BLOOD PRESSURE: 126 MMHG | WEIGHT: 196 LBS | HEART RATE: 69 BPM | HEIGHT: 63 IN | BODY MASS INDEX: 34.73 KG/M2 | DIASTOLIC BLOOD PRESSURE: 77 MMHG

## 2023-02-23 DIAGNOSIS — N63.0 BREAST MASS IN FEMALE: ICD-10-CM

## 2023-02-23 DIAGNOSIS — R87.613 HGSIL (HIGH GRADE SQUAMOUS INTRAEPITHELIAL LESION) ON PAP SMEAR OF CERVIX: Primary | ICD-10-CM

## 2023-02-23 DIAGNOSIS — B97.7 HPV IN FEMALE: ICD-10-CM

## 2023-02-23 LAB — SL AMB POCT URINE HCG: NEGATIVE

## 2023-02-23 NOTE — H&P (VIEW-ONLY)
Colposcopy     Date/Time 2/23/2023 3:32 PM     Universal Protocol   Consent: Verbal consent obtained  Written consent obtained  Risks and benefits: risks, benefits and alternatives were discussed  Consent given by: patient  Time out: Immediately prior to procedure a "time out" was called to verify the correct patient, procedure, equipment, support staff and site/side marked as required  Timeout called at: 2/23/2023 2:49 PM   Patient understanding: patient states understanding of the procedure being performed  Patient consent: the patient's understanding of the procedure matches consent given  Procedure consent: procedure consent matches procedure scheduled  Relevant documents: relevant documents present and verified  Test results: test results available and properly labeled  Patient identity confirmed: verbally with patient and provided demographic data       Performed by  Stef Ruiz DO     Authorized by Stef Ruiz DO        Pre-procedure details     Pre-procedure timeout performed: yes       Indication    HSIL (HPV 16)     Procedure Details   Procedure: Colposcopy w/ cervical biopsy and ECC      Under satisfactory analgesia the patient was prepped and draped in the dorsal lithotomy position: no      Thornton speculum was placed in the vagina: yes      Under colposcopic examination the transition zone was seen in entirety: no      Endocervix was curetted using a Kevorkian curette: yes      Cervical biopsy performed with a cervical biopsy punch: yes      Monsel's solution was applied: yes      Biopsy(s): yes      Location:  10, 12    Specimen to pathology: yes       Post-procedure      Findings: Bleeding, Mosaicism and White epithelium      Impression: High grade cervical dysplasia      Patient tolerance of procedure:   Tolerated well, no immediate complications

## 2023-02-23 NOTE — PROGRESS NOTES
Colposcopy     Date/Time 2/23/2023 3:32 PM     Universal Protocol   Consent: Verbal consent obtained  Written consent obtained  Risks and benefits: risks, benefits and alternatives were discussed  Consent given by: patient  Time out: Immediately prior to procedure a "time out" was called to verify the correct patient, procedure, equipment, support staff and site/side marked as required  Timeout called at: 2/23/2023 2:49 PM   Patient understanding: patient states understanding of the procedure being performed  Patient consent: the patient's understanding of the procedure matches consent given  Procedure consent: procedure consent matches procedure scheduled  Relevant documents: relevant documents present and verified  Test results: test results available and properly labeled  Patient identity confirmed: verbally with patient and provided demographic data       Performed by  Hanane Chan DO     Authorized by Hanane Chan DO        Pre-procedure details     Pre-procedure timeout performed: yes       Indication    HSIL (HPV 16)     Procedure Details   Procedure: Colposcopy w/ cervical biopsy and ECC      Under satisfactory analgesia the patient was prepped and draped in the dorsal lithotomy position: no      Canon speculum was placed in the vagina: yes      Under colposcopic examination the transition zone was seen in entirety: no      Endocervix was curetted using a Kevorkian curette: yes      Cervical biopsy performed with a cervical biopsy punch: yes      Monsel's solution was applied: yes      Biopsy(s): yes      Location:  10, 12    Specimen to pathology: yes       Post-procedure      Findings: Bleeding, Mosaicism and White epithelium      Impression: High grade cervical dysplasia      Patient tolerance of procedure:   Tolerated well, no immediate complications

## 2023-02-23 NOTE — PATIENT INSTRUCTIONS
Thank you for your confidence in our team    We appreciate you and welcome your feedback  If you receive a survey from us, please take a few moments to let us know how we are doing     Sincerely,  Esther Lorenzo, DO

## 2023-02-23 NOTE — PROGRESS NOTES
Met with patient and Dr Violet Rodriguez regarding recommendation for;      ___X__ RIGHT ______LEFT      ___X__Ultrasound guided  ______Stereotactic  Breast biopsy  __X___Verbalized understanding        Blood thinners:  _____yes _X____no    Date stopped: ___________    Biopsy teaching sheet given and reviewed with patient verbalizing understand and questions/concerns addressed at this time:  ___X____yes ______no

## 2023-02-28 ENCOUNTER — PATIENT OUTREACH (OUTPATIENT)
Dept: OBGYN CLINIC | Facility: CLINIC | Age: 38
End: 2023-02-28

## 2023-02-28 NOTE — PROGRESS NOTES
ROSALES HORAN called pt today to address high PHQ-9 on her 1/31 visit  Pt reports that she was in [address] services about two years ago through [facility] family answers, she stopped services because it became hard to upkeep with appointments but also she grew tired of her therapists changing every few months and having to start the theraputic relationship again with someone new  Pt is interested in re-initiating services  ROSALES HORAN sent her a list of places that accept her insurance via email today     Will follow up at her next appointment

## 2023-03-01 ENCOUNTER — OFFICE VISIT (OUTPATIENT)
Dept: OBGYN CLINIC | Facility: CLINIC | Age: 38
End: 2023-03-01

## 2023-03-01 VITALS
SYSTOLIC BLOOD PRESSURE: 111 MMHG | DIASTOLIC BLOOD PRESSURE: 71 MMHG | HEIGHT: 63 IN | WEIGHT: 202 LBS | HEART RATE: 64 BPM | BODY MASS INDEX: 35.79 KG/M2

## 2023-03-01 DIAGNOSIS — Z01.818 PREOP EXAMINATION: Primary | ICD-10-CM

## 2023-03-01 DIAGNOSIS — D06.9 HIGH GRADE SQUAMOUS INTRAEPITHELIAL LESION (HGSIL), GRADE 3 CIN, ON BIOPSY OF CERVIX: ICD-10-CM

## 2023-03-01 DIAGNOSIS — N87.1 HIGH GRADE SQUAMOUS INTRAEPITHELIAL LESION (HGSIL), GRADE 2 CIN, ON BIOPSY OF CERVIX: ICD-10-CM

## 2023-03-01 NOTE — PROGRESS NOTES
Assessment Diagnoses and all orders for this visit:    Preop examination    High grade squamous intraepithelial lesion (HGSIL), grade 3 SHREYA, on biopsy of cervix    High grade squamous intraepithelial lesion (HGSIL), grade 2 SHREYA, on biopsy of cervix         Plan    Vivian You is scheduled for LEEP on 3/17/23  Pre-op instructions, including showering with Hibiclens, discussed with patient and patient received paper copy  The risks, benefits and alternatives to the procedure were discussed  We discussed the risks of pain, bleeding, blood clot, infection, allergic reaction, neurovascular injury, injury to uterus, injury to surrounding structures such as bowel, bladder and/or ureters, and possibility of inability to complete the procedure  We discussed code status - full code  Blood transfusion is acceptable  We discussed resident physician participation in the procedure, including pelvic exam   All questions answered, consent obtained  Subjective     Vivian You is a 40 y o  female here for a pre-op consultation  She is without complaint today  Patient Active Problem List   Diagnosis   • Abnormal Papanicolaou smear of vagina   • Obesity   • Sleep disorder   • Bipolar 1 disorder ([facility] [address] )   • Anxiety   • Closed fracture of bone of left foot   • Depression   • Open fracture of bone of left foot   • Post-traumatic arthritis of right ankle   • Primary osteoarthritis of left foot   • PTSD (post-traumatic stress disorder)   • Developmental disorder of jaw   • Genetic testing   • Mild intermittent asthma without complication   • Acute right-sided low back pain with right-sided sciatica   • Stress incontinence   • Chronic right-sided low back pain with sciatica   • Breast mass in female   • Pap smear abnormality of cervix with HGSIL         Gynecologic History  Patient's last menstrual period was 02/11/2023  Contraception: OCP  Last Pap: 1/31/23  Results were: HGSIL  Last mammogram: 2/23/23   Results were: abnormal    Obstetric History  OB History    Para Term  AB Living   4 1 1   3 1   SAB IAB Ectopic Multiple Live Births   2 1     1      # Outcome Date GA Lbr Mikhail/2nd Weight Sex Delivery Anes PTL Lv   4 IAB            3 SAB            2 SAB            1 Term                Past Medical/Surgical/Family/Social History    Past Medical History:   Diagnosis Date   • Anxiety    • Bipolar 1 disorder ([facility] [address] )    • Depression    • IBS (irritable bowel syndrome)    • Insomnia    • Migraine    • PTSD (post-traumatic stress disorder)      Past Surgical History:   Procedure Laterality Date   • ANKLE HARDWARE REMOVAL     • ANKLE SURGERY     • CHOLECYSTECTOMY     • FOOT SURGERY     • MOUTH SURGERY  2011    metal placed from broken jaw   • ORIF MANDIBULAR FRACTURE Bilateral 6/3/2019    Procedure: MANDIBLE CARROLL REMOVAL AND HARWARE REMOVAL FROM LEFT MANDIBLE ALVEOLAR RIDGE;  Surgeon: Tanvir Ennis DMD;  Location: [facility];  Service: Maxillofacial   • OTHER SURGICAL HISTORY      unlisted craniofacial and maxillofacial procedure     Family History   Problem Relation Age of Onset   • Lung cancer Mother    • Brain cancer Mother    • Bone cancer Mother    • Lung cancer Father    • Bone cancer Father    • Lung cancer Paternal Grandfather    • Liver cancer Maternal Uncle    • Lung cancer Paternal Uncle    • No Known Problems Paternal Aunt    • No Known Problems Paternal Aunt      Social History     Socioeconomic History   • Marital status: Single     Spouse name: Not on file   • Number of children: Not on file   • Years of education: Not on file   • Highest education level: Not on file   Occupational History   • Not on file   Tobacco Use   • Smoking status: Every Day     Packs/day: 0 50     Types: Cigarettes   • Smokeless tobacco: Never   • Tobacco comments:     2-3 cigarettes a day   Substance and Sexual Activity   • Alcohol use: Not Currently     Alcohol/week: 2 0 standard drinks     Types: 2 Glasses of wine per week Comment: social   • Drug use: No     Comment: Denied history of drug use   • Sexual activity: Not Currently     Partners: Male   Other Topics Concern   • Not on file   Social History Narrative   • Not on file     Social Determinants of Health     Financial Resource Strain: Low Risk    • Difficulty of Paying Living Expenses: Not hard at all   Food Insecurity: No Food Insecurity   • Worried About [employer] in the Last Year: Never true   • Ran Out of Food in the Last Year: Never true   Transportation Needs: No Transportation Needs   • Lack of Transportation (Medical): No   • Lack of Transportation (Non-Medical):  No   Physical Activity: Not on file   Stress: Not on file   Social Connections: Not on file   Intimate Partner Violence: Not on file   Housing Stability: Not on file         Codeine, Fentanyl, Penicillins, Naproxen, and Wellbutrin [bupropion]    Current Outpatient Medications:   •  acetaminophen (TYLENOL) 650 mg CR tablet, Take 1 tablet (650 mg total) by mouth every 8 (eight) hours as needed for mild pain, Disp: 30 tablet, Rfl: 0  •  norethindrone (Ortho Micronor) 0 35 MG tablet, Take 1 tablet (0 35 mg total) by mouth daily, Disp: 28 tablet, Rfl: 3  •  albuterol (Ventolin HFA) 90 mcg/act inhaler, Inhale 2 puffs every 6 (six) hours as needed for wheezing (Patient not taking: Reported on 1/11/2023), Disp: 18 g, Rfl: 0  •  baclofen 10 mg tablet, Take 1 tablet (10 mg total) by mouth 3 (three) times a day (Patient not taking: Reported on 1/31/2023), Disp: 90 tablet, Rfl: 0  •  butalbital-acetaminophen-caffeine (FIORICET,ESGIC) -40 mg per tablet, Take 1 tablet by mouth every 6 (six) hours as needed for headaches or migraine (Patient not taking: Reported on 2/12/2020), Disp: 10 tablet, Rfl: 0  •  clonazePAM (KlonoPIN) 0 5 mg tablet, , Disp: , Rfl:   •  cloNIDine (CATAPRES) 0 1 mg tablet, , Disp: , Rfl:   •  CLONIDINE HCL PO, Take by mouth daily at bedtime as needed (Patient not taking: Reported on 6/22/2021), Disp: , Rfl:   •  hydrOXYzine HCL (ATARAX) 25 mg tablet, Take 25 mg by mouth every 6 (six) hours as needed for itching (Patient not taking: Reported on 1/11/2023), Disp: , Rfl:   •  hydrOXYzine pamoate (VISTARIL) 25 mg capsule, , Disp: , Rfl:   •  ibuprofen (MOTRIN) 600 mg tablet, Take 1 tablet (600 mg total) by mouth every 8 (eight) hours as needed for mild pain (Patient not taking: Reported on 1/11/2023), Disp: 30 tablet, Rfl: 0  •  methocarbamol (ROBAXIN) 500 mg tablet, Take 1 tablet (500 mg total) by mouth 3 (three) times a day as needed for muscle spasms (Patient not taking: Reported on 1/11/2023), Disp: 40 tablet, Rfl: 0  •  oxybutynin (DITROPAN-XL) 5 mg 24 hr tablet, Take 1 tablet (5 mg total) by mouth daily (Patient not taking: Reported on 1/11/2023), Disp: 30 tablet, Rfl: 0  •  propranolol (INDERAL) 10 mg tablet, Take 10 mg by mouth 3 (three) times a day as needed   (Patient not taking: Reported on 1/11/2023), Disp: , Rfl:   •  Ziprasidone HCl (GEODON PO), Take by mouth 2 (two) times a day (Patient not taking: Reported on 6/30/2021), Disp: , Rfl:       Review of Systems  Constitutional: no fever, feels well  CV: No complaints of chest pain, palpitations  Pulm: No shortness of breath or dyspnea on exertion  Gastrointestinal: no complaints of abdominal pain, nausea  Genitourinary : no complaints of dysuria, vaginal discharge       Objective     /71   Pulse 64   Ht 5' 3" (1 6 m)   Wt 91 6 kg (202 lb)   LMP 02/11/2023   BMI 35 78 kg/m²     GEN: The patient was alert and oriented x3, pleasant well-appearing female in no acute distress  CV: Regular rate and rhythm  PULM: nonlabored respirations, CTAB  ABD: BS positive, soft, nontender  : deferred  MSK: Normal gait  Skin: warm, dry  Neuro: no focal deficits  Psych: normal affect and judgement, cooperative    Depression Screening Follow-up Plan: Patient's depression screening was positive with a PHQ-2 score of 5   Their PHQ-9 score was 13   Patient has been in touch of  to setup mental health care

## 2023-03-01 NOTE — H&P (VIEW-ONLY)
Assessment Diagnoses and all orders for this visit:    Preop examination    High grade squamous intraepithelial lesion (HGSIL), grade 3 SHREYA, on biopsy of cervix    High grade squamous intraepithelial lesion (HGSIL), grade 2 SHREYA, on biopsy of cervix         Plan    Vivian You is scheduled for LEEP on 3/17/23  Pre-op instructions, including showering with Hibiclens, discussed with patient and patient received paper copy  The risks, benefits and alternatives to the procedure were discussed  We discussed the risks of pain, bleeding, blood clot, infection, allergic reaction, neurovascular injury, injury to uterus, injury to surrounding structures such as bowel, bladder and/or ureters, and possibility of inability to complete the procedure  We discussed code status - full code  Blood transfusion is acceptable  We discussed resident physician participation in the procedure, including pelvic exam   All questions answered, consent obtained  Subjective     Vivian You is a 40 y o  female here for a pre-op consultation  She is without complaint today  Patient Active Problem List   Diagnosis   • Abnormal Papanicolaou smear of vagina   • Obesity   • Sleep disorder   • Bipolar 1 disorder ([facility] [address] )   • Anxiety   • Closed fracture of bone of left foot   • Depression   • Open fracture of bone of left foot   • Post-traumatic arthritis of right ankle   • Primary osteoarthritis of left foot   • PTSD (post-traumatic stress disorder)   • Developmental disorder of jaw   • Genetic testing   • Mild intermittent asthma without complication   • Acute right-sided low back pain with right-sided sciatica   • Stress incontinence   • Chronic right-sided low back pain with sciatica   • Breast mass in female   • Pap smear abnormality of cervix with HGSIL         Gynecologic History  Patient's last menstrual period was 02/11/2023  Contraception: OCP  Last Pap: 1/31/23  Results were: HGSIL  Last mammogram: 2/23/23   Results were: abnormal    Obstetric History  OB History    Para Term  AB Living   4 1 1   3 1   SAB IAB Ectopic Multiple Live Births   2 1     1      # Outcome Date GA Lbr Mikhail/2nd Weight Sex Delivery Anes PTL Lv   4 IAB            3 SAB            2 SAB            1 Term                Past Medical/Surgical/Family/Social History    Past Medical History:   Diagnosis Date   • Anxiety    • Bipolar 1 disorder ([facility] [address] )    • Depression    • IBS (irritable bowel syndrome)    • Insomnia    • Migraine    • PTSD (post-traumatic stress disorder)      Past Surgical History:   Procedure Laterality Date   • ANKLE HARDWARE REMOVAL     • ANKLE SURGERY     • CHOLECYSTECTOMY     • FOOT SURGERY     • MOUTH SURGERY  2011    metal placed from broken jaw   • ORIF MANDIBULAR FRACTURE Bilateral 6/3/2019    Procedure: MANDIBLE CARROLL REMOVAL AND HARWARE REMOVAL FROM LEFT MANDIBLE ALVEOLAR RIDGE;  Surgeon: Niki Prado DMD;  Location: [facility];  Service: Maxillofacial   • OTHER SURGICAL HISTORY      unlisted craniofacial and maxillofacial procedure     Family History   Problem Relation Age of Onset   • Lung cancer Mother    • Brain cancer Mother    • Bone cancer Mother    • Lung cancer Father    • Bone cancer Father    • Lung cancer Paternal Grandfather    • Liver cancer Maternal Uncle    • Lung cancer Paternal Uncle    • No Known Problems Paternal Aunt    • No Known Problems Paternal Aunt      Social History     Socioeconomic History   • Marital status: Single     Spouse name: Not on file   • Number of children: Not on file   • Years of education: Not on file   • Highest education level: Not on file   Occupational History   • Not on file   Tobacco Use   • Smoking status: Every Day     Packs/day: 0 50     Types: Cigarettes   • Smokeless tobacco: Never   • Tobacco comments:     2-3 cigarettes a day   Substance and Sexual Activity   • Alcohol use: Not Currently     Alcohol/week: 2 0 standard drinks     Types: 2 Glasses of wine per week Comment: social   • Drug use: No     Comment: Denied history of drug use   • Sexual activity: Not Currently     Partners: Male   Other Topics Concern   • Not on file   Social History Narrative   • Not on file     Social Determinants of Health     Financial Resource Strain: Low Risk    • Difficulty of Paying Living Expenses: Not hard at all   Food Insecurity: No Food Insecurity   • Worried About [address] [employer] in the Last Year: Never true   • Ran Out of Food in the Last Year: Never true   Transportation Needs: No Transportation Needs   • Lack of Transportation (Medical): No   • Lack of Transportation (Non-Medical):  No   Physical Activity: Not on file   Stress: Not on file   Social Connections: Not on file   Intimate Partner Violence: Not on file   Housing Stability: Not on file         Codeine, Fentanyl, Penicillins, Naproxen, and Wellbutrin [bupropion]    Current Outpatient Medications:   •  acetaminophen (TYLENOL) 650 mg CR tablet, Take 1 tablet (650 mg total) by mouth every 8 (eight) hours as needed for mild pain, Disp: 30 tablet, Rfl: 0  •  norethindrone (Ortho Micronor) 0 35 MG tablet, Take 1 tablet (0 35 mg total) by mouth daily, Disp: 28 tablet, Rfl: 3  •  albuterol (Ventolin HFA) 90 mcg/act inhaler, Inhale 2 puffs every 6 (six) hours as needed for wheezing (Patient not taking: Reported on 1/11/2023), Disp: 18 g, Rfl: 0  •  baclofen 10 mg tablet, Take 1 tablet (10 mg total) by mouth 3 (three) times a day (Patient not taking: Reported on 1/31/2023), Disp: 90 tablet, Rfl: 0  •  butalbital-acetaminophen-caffeine (FIORICET,ESGIC) -40 mg per tablet, Take 1 tablet by mouth every 6 (six) hours as needed for headaches or migraine (Patient not taking: Reported on 2/12/2020), Disp: 10 tablet, Rfl: 0  •  clonazePAM (KlonoPIN) 0 5 mg tablet, , Disp: , Rfl:   •  cloNIDine (CATAPRES) 0 1 mg tablet, , Disp: , Rfl:   •  CLONIDINE HCL PO, Take by mouth daily at bedtime as needed (Patient not taking: Reported on 6/22/2021), Disp: , Rfl:   •  hydrOXYzine HCL (ATARAX) 25 mg tablet, Take 25 mg by mouth every 6 (six) hours as needed for itching (Patient not taking: Reported on 1/11/2023), Disp: , Rfl:   •  hydrOXYzine pamoate (VISTARIL) 25 mg capsule, , Disp: , Rfl:   •  ibuprofen (MOTRIN) 600 mg tablet, Take 1 tablet (600 mg total) by mouth every 8 (eight) hours as needed for mild pain (Patient not taking: Reported on 1/11/2023), Disp: 30 tablet, Rfl: 0  •  methocarbamol (ROBAXIN) 500 mg tablet, Take 1 tablet (500 mg total) by mouth 3 (three) times a day as needed for muscle spasms (Patient not taking: Reported on 1/11/2023), Disp: 40 tablet, Rfl: 0  •  oxybutynin (DITROPAN-XL) 5 mg 24 hr tablet, Take 1 tablet (5 mg total) by mouth daily (Patient not taking: Reported on 1/11/2023), Disp: 30 tablet, Rfl: 0  •  propranolol (INDERAL) 10 mg tablet, Take 10 mg by mouth 3 (three) times a day as needed   (Patient not taking: Reported on 1/11/2023), Disp: , Rfl:   •  Ziprasidone HCl (GEODON PO), Take by mouth 2 (two) times a day (Patient not taking: Reported on 6/30/2021), Disp: , Rfl:       Review of Systems  Constitutional: no fever, feels well  CV: No complaints of chest pain, palpitations  Pulm: No shortness of breath or dyspnea on exertion  Gastrointestinal: no complaints of abdominal pain, nausea  Genitourinary : no complaints of dysuria, vaginal discharge       Objective     /71   Pulse 64   Ht 5' 3" (1 6 m)   Wt 91 6 kg (202 lb)   LMP 02/11/2023   BMI 35 78 kg/m²     GEN: The patient was alert and oriented x3, pleasant well-appearing female in no acute distress  CV: Regular rate and rhythm  PULM: nonlabored respirations, CTAB  ABD: BS positive, soft, nontender  : deferred  MSK: Normal gait  Skin: warm, dry  Neuro: no focal deficits  Psych: normal affect and judgement, cooperative    Depression Screening Follow-up Plan: Patient's depression screening was positive with a PHQ-2 score of 5   Their PHQ-9 score was 13   Patient has been in touch of  to setup mental health care

## 2023-03-01 NOTE — PATIENT INSTRUCTIONS
Thank you for your confidence in our team    We appreciate you and welcome your feedback  If you receive a survey from us, please take a few moments to let us know how we are doing     Sincerely,  Sandra Bolden, DO

## 2023-03-02 ENCOUNTER — DOCUMENTATION (OUTPATIENT)
Dept: GYNECOLOGY | Facility: CLINIC | Age: 38
End: 2023-03-02

## 2023-03-06 ENCOUNTER — ANESTHESIA EVENT (OUTPATIENT)
Dept: PERIOP | Facility: HOSPITAL | Age: 38
End: 2023-03-06

## 2023-03-07 ENCOUNTER — TELEPHONE (OUTPATIENT)
Dept: OBGYN CLINIC | Facility: CLINIC | Age: 38
End: 2023-03-07

## 2023-03-13 RX ORDER — EPHEDRINE HCL 12.5 MG
TABLET ORAL DAILY PRN
COMMUNITY

## 2023-03-13 NOTE — PRE-PROCEDURE INSTRUCTIONS
Pre-Surgery Instructions:   Medication Instructions   • acetaminophen (TYLENOL) 650 mg CR tablet Continue to take as prescribed including DOS with a small sip of water, unless usually taken at night   • albuterol (Ventolin HFA) 90 mcg/act inhaler Continue as prescribed   • ePHEDrine HCl (Primatene) 12 5 MG TABS Continue to take as prescribed including DOS with a small sip of water, unless usually taken at night   • norethindrone (Ortho Micronor) 0 35 MG tablet Continue to take as prescribed including DOS with a small sip of water, unless usually taken at night    Medication instructions for day surgery reviewed  Please use only a sip of water to take your instructed medications  Avoid all over the counter vitamins, supplements and NSAIDS for one week prior to surgery per anesthesia guidelines  Tylenol is ok to take as needed  You will receive a call one business day prior to surgery with an arrival time and hospital directions  If your surgery is scheduled on a Monday, the hospital will be calling you on the Friday prior to your surgery  If you have not heard from anyone by 8pm, please call the hospital supervisor through the hospital  at [phone]  Yusef Cesar [phone])  Do not eat or drink anything after midnight the night before your surgery, including candy, mints, lifesavers, or chewing gum  Do not drink alcohol 24hrs before your surgery  Try not to smoke at least 24hrs before your surgery  Follow the pre surgery showering instructions as listed in the [facility] Surgical Experience Booklet” or otherwise provided by your surgeon's office  Do not shave the surgical area 24 hours before surgery  Do not apply any lotions, creams, including makeup, cologne, deodorant, or perfumes after showering on the day of your surgery  No contact lenses, eye make-up, or artificial eyelashes  Remove nail polish, including gel polish, and any artificial, gel, or acrylic nails if possible   Remove all jewelry including rings and body piercing jewelry  Wear causal clothing that is easy to take on and off  Consider your type of surgery  Keep any valuables, jewelry, piercings at home  Please bring any specially ordered equipment (sling, braces) if indicated  Arrange for a responsible person to drive you to and from the hospital on the day of your surgery  Visitor Guidelines discussed  Call the surgeon's office with any new illnesses, exposures, or additional questions prior to surgery  Please reference your [facility] Surgical Experience Booklet” for additional information to prepare for your upcoming surgery

## 2023-03-17 ENCOUNTER — HOSPITAL ENCOUNTER (OUTPATIENT)
Facility: HOSPITAL | Age: 38
Setting detail: OUTPATIENT SURGERY
Discharge: HOME | End: 2023-03-17
Attending: OBSTETRICS & GYNECOLOGY | Admitting: OBSTETRICS & GYNECOLOGY

## 2023-03-17 ENCOUNTER — ANESTHESIA (OUTPATIENT)
Dept: PERIOP | Facility: HOSPITAL | Age: 38
End: 2023-03-17

## 2023-03-17 VITALS
BODY MASS INDEX: 34.57 KG/M2 | WEIGHT: 195.11 LBS | TEMPERATURE: 97.4 F | OXYGEN SATURATION: 97 % | RESPIRATION RATE: 16 BRPM | DIASTOLIC BLOOD PRESSURE: 59 MMHG | SYSTOLIC BLOOD PRESSURE: 102 MMHG | HEIGHT: 63 IN | HEART RATE: 66 BPM

## 2023-03-17 DIAGNOSIS — N87.9 DYSPLASIA OF CERVIX: ICD-10-CM

## 2023-03-17 DIAGNOSIS — N87.1 CERVICAL INTRAEPITHELIAL NEOPLASIA II: ICD-10-CM

## 2023-03-17 DIAGNOSIS — T65.291A TOXIC EFFECT OF TOBACCO AND NICOTINE, ACCIDENTAL OR UNINTENTIONAL, INITIAL ENCOUNTER: Primary | ICD-10-CM

## 2023-03-17 LAB
EXT PREGNANCY TEST URINE: NEGATIVE
EXT. CONTROL: NORMAL

## 2023-03-17 RX ORDER — ACETIC ACID 5 %
LIQUID (ML) MISCELLANEOUS AS NEEDED
Status: DISCONTINUED | OUTPATIENT
Start: 2023-03-17 | End: 2023-03-17 | Stop reason: HOSPADM

## 2023-03-17 RX ORDER — ONDANSETRON 2 MG/ML
4 INJECTION INTRAMUSCULAR; INTRAVENOUS ONCE AS NEEDED
Status: DISCONTINUED | OUTPATIENT
Start: 2023-03-17 | End: 2023-03-17 | Stop reason: HOSPADM

## 2023-03-17 RX ORDER — FENTANYL CITRATE/PF 50 MCG/ML
50 SYRINGE (ML) INJECTION
Status: DISCONTINUED | OUTPATIENT
Start: 2023-03-17 | End: 2023-03-17 | Stop reason: HOSPADM

## 2023-03-17 RX ORDER — ONDANSETRON 2 MG/ML
INJECTION INTRAMUSCULAR; INTRAVENOUS AS NEEDED
Status: DISCONTINUED | OUTPATIENT
Start: 2023-03-17 | End: 2023-03-17

## 2023-03-17 RX ORDER — PROPOFOL 10 MG/ML
INJECTION, EMULSION INTRAVENOUS AS NEEDED
Status: DISCONTINUED | OUTPATIENT
Start: 2023-03-17 | End: 2023-03-17

## 2023-03-17 RX ORDER — LIDOCAINE HYDROCHLORIDE 20 MG/ML
INJECTION, SOLUTION EPIDURAL; INFILTRATION; INTRACAUDAL; PERINEURAL AS NEEDED
Status: DISCONTINUED | OUTPATIENT
Start: 2023-03-17 | End: 2023-03-17

## 2023-03-17 RX ORDER — KETOROLAC TROMETHAMINE 30 MG/ML
INJECTION, SOLUTION INTRAMUSCULAR; INTRAVENOUS AS NEEDED
Status: DISCONTINUED | OUTPATIENT
Start: 2023-03-17 | End: 2023-03-17

## 2023-03-17 RX ORDER — MAGNESIUM HYDROXIDE 1200 MG/15ML
LIQUID ORAL AS NEEDED
Status: DISCONTINUED | OUTPATIENT
Start: 2023-03-17 | End: 2023-03-17 | Stop reason: HOSPADM

## 2023-03-17 RX ORDER — ONDANSETRON 2 MG/ML
4 INJECTION INTRAMUSCULAR; INTRAVENOUS EVERY 6 HOURS PRN
Status: DISCONTINUED | OUTPATIENT
Start: 2023-03-17 | End: 2023-03-17 | Stop reason: HOSPADM

## 2023-03-17 RX ORDER — SODIUM CHLORIDE 9 MG/ML
125 INJECTION, SOLUTION INTRAVENOUS CONTINUOUS
Status: DISCONTINUED | OUTPATIENT
Start: 2023-03-17 | End: 2023-03-17 | Stop reason: HOSPADM

## 2023-03-17 RX ORDER — DEXAMETHASONE SODIUM PHOSPHATE 10 MG/ML
INJECTION, SOLUTION INTRAMUSCULAR; INTRAVENOUS AS NEEDED
Status: DISCONTINUED | OUTPATIENT
Start: 2023-03-17 | End: 2023-03-17

## 2023-03-17 RX ORDER — LIDOCAINE HYDROCHLORIDE AND EPINEPHRINE 10; 10 MG/ML; UG/ML
INJECTION, SOLUTION INFILTRATION; PERINEURAL AS NEEDED
Status: DISCONTINUED | OUTPATIENT
Start: 2023-03-17 | End: 2023-03-17 | Stop reason: HOSPADM

## 2023-03-17 RX ORDER — HYDROMORPHONE HCL/PF 1 MG/ML
SYRINGE (ML) INJECTION AS NEEDED
Status: DISCONTINUED | OUTPATIENT
Start: 2023-03-17 | End: 2023-03-17

## 2023-03-17 RX ORDER — HYDROMORPHONE HCL/PF 1 MG/ML
0.5 SYRINGE (ML) INJECTION
Status: DISCONTINUED | OUTPATIENT
Start: 2023-03-17 | End: 2023-03-17 | Stop reason: HOSPADM

## 2023-03-17 RX ORDER — MIDAZOLAM HYDROCHLORIDE 2 MG/2ML
INJECTION, SOLUTION INTRAMUSCULAR; INTRAVENOUS AS NEEDED
Status: DISCONTINUED | OUTPATIENT
Start: 2023-03-17 | End: 2023-03-17

## 2023-03-17 RX ORDER — IBUPROFEN 600 MG/1
600 TABLET ORAL EVERY 6 HOURS PRN
Status: DISCONTINUED | OUTPATIENT
Start: 2023-03-17 | End: 2023-03-17 | Stop reason: HOSPADM

## 2023-03-17 RX ORDER — RISPERIDONE 4 MG/1
10 TABLET ORAL DAILY
COMMUNITY

## 2023-03-17 RX ORDER — EPHEDRINE SULFATE 50 MG/ML
INJECTION INTRAVENOUS AS NEEDED
Status: DISCONTINUED | OUTPATIENT
Start: 2023-03-17 | End: 2023-03-17

## 2023-03-17 RX ORDER — ACETAMINOPHEN 325 MG/1
975 TABLET ORAL EVERY 6 HOURS PRN
Status: DISCONTINUED | OUTPATIENT
Start: 2023-03-17 | End: 2023-03-17 | Stop reason: HOSPADM

## 2023-03-17 RX ADMIN — EPHEDRINE SULFATE 10 MG: 50 INJECTION, SOLUTION INTRAVENOUS at 09:04

## 2023-03-17 RX ADMIN — ONDANSETRON 4 MG: 2 INJECTION INTRAMUSCULAR; INTRAVENOUS at 08:37

## 2023-03-17 RX ADMIN — DEXAMETHASONE SODIUM PHOSPHATE 10 MG: 10 INJECTION INTRAMUSCULAR; INTRAVENOUS at 08:29

## 2023-03-17 RX ADMIN — EPHEDRINE SULFATE 10 MG: 50 INJECTION, SOLUTION INTRAVENOUS at 08:57

## 2023-03-17 RX ADMIN — LIDOCAINE HYDROCHLORIDE 80 MG: 20 INJECTION, SOLUTION EPIDURAL; INFILTRATION; INTRACAUDAL; PERINEURAL at 08:25

## 2023-03-17 RX ADMIN — HYDROMORPHONE HYDROCHLORIDE 0.5 MG: 1 INJECTION, SOLUTION INTRAMUSCULAR; INTRAVENOUS; SUBCUTANEOUS at 09:45

## 2023-03-17 RX ADMIN — PROPOFOL 200 MG: 10 INJECTION, EMULSION INTRAVENOUS at 08:25

## 2023-03-17 RX ADMIN — HYDROMORPHONE HYDROCHLORIDE 0.5 MG: 1 INJECTION, SOLUTION INTRAMUSCULAR; INTRAVENOUS; SUBCUTANEOUS at 08:25

## 2023-03-17 RX ADMIN — SODIUM CHLORIDE: 0.9 INJECTION, SOLUTION INTRAVENOUS at 09:05

## 2023-03-17 RX ADMIN — KETOROLAC TROMETHAMINE 30 MG: 30 INJECTION, SOLUTION INTRAMUSCULAR at 08:59

## 2023-03-17 RX ADMIN — HYDROMORPHONE HYDROCHLORIDE 0.5 MG: 1 INJECTION, SOLUTION INTRAMUSCULAR; INTRAVENOUS; SUBCUTANEOUS at 08:37

## 2023-03-17 RX ADMIN — MIDAZOLAM 2 MG: 1 INJECTION INTRAMUSCULAR; INTRAVENOUS at 08:24

## 2023-03-17 RX ADMIN — SODIUM CHLORIDE: 0.9 INJECTION, SOLUTION INTRAVENOUS at 08:22

## 2023-03-17 NOTE — OP NOTE
OPERATIVE REPORT  PATIENT NAME: Keila You    :  1985  MRN: [id number]  Pt Location: [facility] OR ROOM 04    SURGERY DATE: 3/17/2023    Surgeon(s) and Role:     Lodema Osier Toussaint-Foster, DO - Primary     * Baldemar Lesches, MD - Assisting    Preop Diagnosis:  Cervical intraepithelial neoplasia II [N87 1]  Dysplasia of cervix [N87 9]    Post-Op Diagnosis Codes:     * Cervical intraepithelial neoplasia II [N87 1]     * Dysplasia of cervix [N87 9]    Procedure(s):  BIOPSY LEEP CERVIX    Specimen(s):  ID Type Source Tests Collected by Time Destination   1 : CERVICAL CONE BIOPSY Tissue Cervix, Endocervical TISSUE EXAM Alexander Eaton DO 3/17/2023  8:57 AM        Estimated Blood Loss:   15cc    Drains:  * No LDAs found *    Anesthesia Type:   General/LMA    Operative Indications:  Cervical intraepithelial neoplasia II [N87 1]  Dysplasia of cervix [N87 9]    Operative Findings:  Normal external female genitalia  Anteverted uterus, cervix with acetowhite lesions after application of acetic acid    Complications:   None    Procedure and Technique:  Description of Procedure    Patient was taken to the operating room were a time out was performed to confirm correct patient and correct procedure  General LMA anesthesia (LMA) was administered and the patient was positioned on the OR table in the dorsal lithotomy position  All pressure points were padded and a renée hugger was placed to maintain control of core body temperature  A bimanual exam was performed and the uterus was noted to be anteverted, normal in size and consistency with no palpable adnexal masses or fullness  The patient was prepped and draped in the usual sterile fashion  Operative Technique    The bladder was emptied with a straight catheter and 5 milliliters of clear yellow urine were obtained  A coated speculum was inserted into the vagina and used to visualize the cervix   Lidocaine with epinephrine was injected circumferentially on the face of the cervix and blanching was noted  The transformation zone was identified  Acetic acid solution was applied to the cervix and a lesion was identified at the cervical os  The deep cone loop electrode was selected and used to excise the lesion  Cervical specimen was sent for pathology  The cervical bed was cauterized using a ball tip Bovie electrocautery, taking care to avoid the cervical canal  Monsel's solution was applied  2 figure-of-8 stitches was placed at 2'o'clock  Good hemostasis was confirmed at the conclusion of the procedure  Coated speculum was removed from vagina  At the conclusion of the procedure, all needle, sponge, and instrument counts were noted to be correct x2  Patient tolerated the procedure well and was transferred to PACU in stable condition prior to discharge with follow up in 1-2 weeks  Dr Aidee Jacques was present and participated in all key portions of the case      Patient Disposition:  PACU         SIGNATURE: Fabi Delgado MD  DATE: March 17, 2023  TIME: 9:27 AM

## 2023-03-17 NOTE — INTERVAL H&P NOTE
H&P reviewed  After examining the patient I find no changes in the patients condition since the H&P had been written      Vitals:    03/17/23 0742   BP: 122/73   Pulse: 72   Resp: 16   Temp: (!) 96 8 °F (36 °C)   SpO2: 94%

## 2023-03-17 NOTE — ANESTHESIA PREPROCEDURE EVALUATION
Procedure:  BIOPSY LEEP CERVIX (Cervix)    Relevant Problems   MUSCULOSKELETAL   (+) Acute right-sided low back pain with right-sided sciatica   (+) Chronic right-sided low back pain with sciatica   (+) Post-traumatic arthritis of right ankle   (+) Primary osteoarthritis of left foot      NEURO/PSYCH   (+) Anxiety   (+) Chronic right-sided low back pain with sciatica   (+) Depression   (+) PTSD (post-traumatic stress disorder)      PULMONARY   (+) Asthma   (+) Mild intermittent asthma without complication   (+) Smoker      Other   (+) Abnormal Papanicolaou smear of vagina   (+) Bipolar 1 disorder (HCC)   (+) Obesity        Physical Exam    Airway    Mallampati score: II  TM Distance: >3 FB  Neck ROM: full     Dental   upper dentures and lower dentures,     Cardiovascular  Rhythm: regular, Rate: normal, Cardiovascular exam normal    Pulmonary  Pulmonary exam normal Breath sounds clear to auscultation,     Other Findings        Anesthesia Plan  ASA Score- 2     Anesthesia Type- general with ASA Monitors  Additional Monitors:   Airway Plan: LMA  Plan Factors-    Chart reviewed  Existing labs reviewed  Patient summary reviewed  Patient is a current smoker  Patient instructed to abstain from smoking on day of procedure  Patient did not smoke on day of surgery  There is medical exclusion for perioperative obstructive sleep apnea risk education  Induction- intravenous  Postoperative Plan-     Informed Consent- Anesthetic plan and risks discussed with patient and sibling Maurice Fragoso (brother))

## 2023-03-17 NOTE — ANESTHESIA POSTPROCEDURE EVALUATION
Post-Op Assessment Note    CV Status:  Stable  Pain Score: 2    Pain management: adequate     Mental Status:  Alert and awake   Hydration Status:  Euvolemic and stable   PONV Controlled:  Controlled   Airway Patency:  Patent      Post Op Vitals Reviewed: Yes      Staff: Anesthesiologist         No notable events documented      BP      Temp     Pulse     Resp      SpO2      /58   Pulse 63   Temp (!) 97 4 °F (36 3 °C) (Tympanic)   Resp 16   Ht 5' 3" (1 6 m)   Wt 88 5 kg (195 lb 1 7 oz)   LMP 03/10/2023   SpO2 98%   BMI 34 56 kg/m²

## 2023-03-20 ENCOUNTER — TELEPHONE (OUTPATIENT)
Dept: OBGYN CLINIC | Facility: CLINIC | Age: 38
End: 2023-03-20

## 2023-03-21 ENCOUNTER — TELEMEDICINE (OUTPATIENT)
Dept: PULMONOLOGY | Facility: HOSPITAL | Age: 38
End: 2023-03-21

## 2023-03-21 VITALS — WEIGHT: 202 LBS | BODY MASS INDEX: 35.78 KG/M2

## 2023-03-21 DIAGNOSIS — N89.8 VAGINAL ODOR: Primary | ICD-10-CM

## 2023-03-21 DIAGNOSIS — T65.294A TOXIC EFFECT OF TOBACCO, UNDETERMINED INTENT, INITIAL ENCOUNTER: Primary | ICD-10-CM

## 2023-03-21 DIAGNOSIS — N30.00 ACUTE CYSTITIS WITHOUT HEMATURIA: Primary | ICD-10-CM

## 2023-03-21 RX ORDER — METRONIDAZOLE 500 MG/1
500 TABLET ORAL EVERY 12 HOURS SCHEDULED
Qty: 14 TABLET | Refills: 0 | Status: SHIPPED | OUTPATIENT
Start: 2023-03-21 | End: 2023-03-28

## 2023-03-21 RX ORDER — NITROFURANTOIN 25; 75 MG/1; MG/1
100 CAPSULE ORAL 2 TIMES DAILY
Qty: 14 CAPSULE | Refills: 0 | Status: SHIPPED | OUTPATIENT
Start: 2023-03-21 | End: 2023-03-28

## 2023-03-21 NOTE — PROGRESS NOTES
Virtual Visit with Tobacco Cessation    Verification of patient location:    Patient is located in the following state in which I hold an active license [place]    Problem List Items Addressed This Visit    None  Visit Diagnoses     Toxic effect of tobacco, undetermined intent, initial encounter    -  Primary    Relevant Medications    nicotine (NICOTROL) 10 MG inhaler            Patient presenting for initial visit, appears very motivated  Her nicotine dependence is severe  Management complicated by Bipolar disorder  We agreed to avoid Wellbutrin and Chantix  Discussed multiple other smoking cessation options  Patient has opted for the Nicotrol inhaler  Proper use and potential side effects discussed  If it is unavailable at her pharmacy or not covered by her insurance, we will plan for nicotine lozenges instead  She will be joining the group support classes for smoking cessation  We discussed her smoking habits/patterns and methods to help increase her chances of success and prevent relapse   Provided support and encouragement    F/u in 4 weeks or sooner if needed    Reason for visit is smoking cessation    Encounter provider Manual BRANDON Villanueva    Provider located at [address] [facility] [address]      Recent Visits  No visits were found meeting these conditions  Showing recent visits within past 7 days and meeting all other requirements  Today's Visits  Date Type Provider Dept   03/21/23 Telemedicine Manual BRANDON Villanueva [facility] [place]   Showing today's visits and meeting all other requirements  Future Appointments  No visits were found meeting these conditions  Showing future appointments within next 150 days and meeting all other requirements       The patient was identified by name and date of birth   Vivian A Day was informed that this is a telemedicine visit and that the visit is being conducted throughRespectance platform  She agrees to proceed     My office door was closed  No one else was in the room  She acknowledged consent and understanding of privacy and security of the platform  Vivian You verbally agrees to participate in [employer]  Pt is aware that [employer] could be limited without vital signs or the ability to perform a full hands-on physical exam  Vivian You understands she or the provider may request at any time to terminate the video visit and request the patient to seek care or treatment in person  Patient is aware this is a billable service  Subjective  HPI  Vivian You is a 40 y o  female senting for her initial smoking cessation visit  She was referred by her gynecology team   She is currently smoking 1/2 to 1 pack/day  She has a strong desire to quit smoking due to concern for possible cervical cancer  Only time she has been able to maintain prolonged periods of smoking cessation in the past were when there were external factors such as when she was in juvenile custodial or admitted to the hospital   She has a history of bipolar disorder taking respite all  She recently reestablished with a psychiatrist yesterday for the first time in a while  She will also be seeing a therapist   She reports previous adverse reaction to Wellbutrin and would prefer to avoid Chantix, which I also feel is reasonable  Depression and anxiety levels are not optimally managed yet  She tried nicotine patches in the past and this gave her nausea and vomiting  Her triggers for smoking include soon after waking up, boredom, reading, and after meals  Stress is also a major trigger for her  She tries to stay fairly busy  She works full-time as a  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  She is returning to work tomorrow       Smoking history:  What type of tobacco product used:  Cigarettes     How many Packs of Cigarettes per day on average:  1/2 - 1 PPD Largest amount of tobacco EVER used regularly: 2 PPD, 5 years ago      How old when started using Tobacco regularly:  14     How many years using tobacco regularly:   20+     How soon after waking up do you smoke:  almost immediately      Past Attempts to go Tobacco free:              Intentionally Cut Down or limit use:  YES/NO: Yes                How many serious attempt to go tobacco free:   Multiple                            Last Serious attempt:   Current                 Which symptoms experienced when tried to go Tobacco Free:   Strong cravings, stress, increased appetite                 Methods to used on prior attempt:   Nicotine patches, vaping                 Desire to Quit:   Yes     Motivations to Quit:   Health     Has a Healthcare Professional recommend smoking cessation?   Yes     What Obstacles do you face for quitting:   Boredom, stress, fear of gaining weight    Past Medical History:   Diagnosis Date   • Anxiety    • Asthma    • Bipolar 1 disorder ([facility] [address] )    • Depression    • IBS (irritable bowel syndrome)    • Insomnia    • Migraine    • PTSD (post-traumatic stress disorder)        Past Surgical History:   Procedure Laterality Date   • ANKLE HARDWARE REMOVAL Right    • ANKLE SURGERY Right    • CHOLECYSTECTOMY     • FOOT SURGERY Left     with hardware   • MOUTH SURGERY  2011    metal placed from broken jaw   • ORIF MANDIBULAR FRACTURE Bilateral 06/03/2019    Procedure: [address] FROM LEFT MANDIBLE ALVEOLAR RIDGE;  Surgeon: Amada Macedo DMD;  Location:  MAIN OR;  Service: Maxillofacial   • OTHER SURGICAL HISTORY      unlisted craniofacial and maxillofacial procedure   • GA COLPOSCOPY CERVIX VAG LOOP ELTRD BX CERVIX N/A 3/17/2023    Procedure: BIOPSY LEEP CERVIX;  Surgeon: Francisco Franklin DO;  [facility];  Service: Gynecology       Social History     Tobacco Use   Smoking Status Every Day   • Packs/day: 0 50   • Years: 24 00   • Pack years: 12 00   • Types: Cigarettes   • Start date: 1999   Smokeless Tobacco Never   Tobacco Comments    2-3 cigarettes a day       E-Cigarette/Vaping   • E-Cigarette Use Current Some Day User      E-Cigarette/Vaping Substances   • Nicotine No    • THC No    • CBD No    • Flavoring Yes    • Other No    • Unknown No        Current Outpatient Medications   Medication Sig Dispense Refill   • acetaminophen (TYLENOL) 650 mg CR tablet Take 1 tablet (650 mg total) by mouth every 8 (eight) hours as needed for mild pain 30 tablet 0   • albuterol (Ventolin HFA) 90 mcg/act inhaler Inhale 2 puffs every 6 (six) hours as needed for wheezing 18 g 0   • ePHEDrine HCl (Primatene) 12 5 MG TABS Take by mouth daily as needed     • metroNIDAZOLE (FLAGYL) 500 mg tablet Take 1 tablet (500 mg total) by mouth every 12 (twelve) hours for 7 days 14 tablet 0   • nicotine (NICOTROL) 10 MG inhaler Inhale 1 puff as needed for smoking cessation 168 each 0   • nitrofurantoin (MACROBID) 100 mg capsule Take 1 capsule (100 mg total) by mouth 2 (two) times a day for 7 days 14 capsule 0   • norethindrone (Ortho Micronor) 0 35 MG tablet Take 1 tablet (0 35 mg total) by mouth daily 28 tablet 3   • risperiDONE (RisperDAL) 4 mg tablet Take 10 mg by mouth daily       No current facility-administered medications for this visit  Allergies   Allergen Reactions   • Codeine GI Intolerance   • Fentanyl      Flushing   • Penicillins Headache, Other (See Comments) and Vomiting     Severe vomitting     • Naproxen Rash   • Wellbutrin [Bupropion] Rash       Review of Systems   Respiratory: Positive for cough and shortness of breath  Psychiatric/Behavioral: Positive for dysphoric mood  The patient is nervous/anxious  All other systems reviewed and are negative  Video Exam    Vitals:    03/21/23 1449   Weight: 91 6 kg (202 lb)       Physical Exam   General:  Awake, alert, and oriented  No acute distress      HEENT: Normocephalic, without obvious abnormality, atraumatic  Sclera non-icteric  Hearing intact  Pulm:  Effort normal  No respiratory distress  Musculoskeletal:  No gross deformity noted  Neuro:  No aphasia  No facial asymmetry  No acute focal neurologic deficit noted  Skin:  No jaundice, erythema, or cyanosis appreciated  Psych:  Cooperative  Normal mood and affect  Behavior is normal         Tobacco Use/Cessation  I assessed Vivian A Kenyatta to be in a contemplative stage with respect to tobacco use  I advised patient to quit, and offered support  Phil Darden SHERLYN Day is a 40 y o  female here for a discussion regarding smoking cessation  She began smoking several years ago  She currently smokes 1 packs per day  She has attempted to quit smoking in the past, most recently a few weeks ago  Barriers to quitting include: fear of failing again, fear of weight gain and under a lot of stress now  As a result of this visit, I have referred the patient for further respiratory evaluation   No - advised to follow-up w PCP for management of asthma, may need referral to pulmonary for optimization but will defer to her PCP    I spent 18 minutes with patient today in which >10 minutes of the time was spent in counseling/coordination of care regarding tobacco cessation

## 2023-03-22 ENCOUNTER — TELEPHONE (OUTPATIENT)
Dept: PULMONOLOGY | Facility: CLINIC | Age: 38
End: 2023-03-22

## 2023-03-22 DIAGNOSIS — T65.294A TOXIC EFFECT OF TOBACCO, UNDETERMINED INTENT, INITIAL ENCOUNTER: Primary | ICD-10-CM

## 2023-03-22 NOTE — TELEPHONE ENCOUNTER
Pt calling in to advise that the Nicotine Inhaler is not covered by her insurance  Pt requesting the Lozenges be sent in as discussed during her visit yesterday   Please advise

## 2023-03-23 ENCOUNTER — TELEPHONE (OUTPATIENT)
Dept: OBGYN CLINIC | Facility: CLINIC | Age: 38
End: 2023-03-23

## 2023-03-24 ENCOUNTER — OFFICE VISIT (OUTPATIENT)
Dept: OBGYN CLINIC | Facility: CLINIC | Age: 38
End: 2023-03-24

## 2023-03-24 VITALS
DIASTOLIC BLOOD PRESSURE: 83 MMHG | BODY MASS INDEX: 35.15 KG/M2 | HEIGHT: 63 IN | SYSTOLIC BLOOD PRESSURE: 121 MMHG | WEIGHT: 198.4 LBS | HEART RATE: 76 BPM

## 2023-03-24 DIAGNOSIS — Z09 POSTOP CHECK: Primary | ICD-10-CM

## 2023-03-24 DIAGNOSIS — L23.89 ALLERGIC CONTACT DERMATITIS DUE TO OTHER AGENTS: ICD-10-CM

## 2023-03-24 RX ORDER — HYDROCORTISONE VALERATE CREAM 2 MG/G
CREAM TOPICAL 2 TIMES DAILY
Qty: 45 G | Refills: 0 | Status: SHIPPED | OUTPATIENT
Start: 2023-03-24

## 2023-03-24 NOTE — PROGRESS NOTES
García You is a 40 y o  female who presents to the clinic 1 weeks status post LEEP for SHREYA 2& SHREYA 3  Eating a regular diet without difficulty  Bowel movements are normal  The patient is not having any pain  The following portions of the patient's history were reviewed and updated as appropriate: allergies, current medications, past family history, past medical history, past social history and past surgical history  Review of Systems  Pertinent items are noted in HPI  Objective     /83   Pulse 76   Ht 5' 3" (1 6 m)   Wt 90 kg (198 lb 6 4 oz)   LMP 03/10/2023   BMI 35 14 kg/m²   General:  alert and oriented, in no acute distress   Inner thigh: Positive erythema noted, no lesion              Assessment      Doing well postoperatively  Operative findings again reviewed  Pathology report discussed  Plan     1  Continue any current medications  2  Contact dermatis resolving will escribe westcort cream   2    Keep scheduled postop visit

## 2023-03-24 NOTE — PATIENT INSTRUCTIONS
Thank you for your confidence in our team    We appreciate you and welcome your feedback  If you receive a survey from us, please take a few moments to let us know how we are doing     Sincerely,  Lisa Albarran, DO

## 2023-03-27 DIAGNOSIS — L23.89 ALLERGIC CONTACT DERMATITIS DUE TO OTHER AGENTS: Primary | ICD-10-CM

## 2023-03-29 ENCOUNTER — HOSPITAL ENCOUNTER (OUTPATIENT)
Dept: ULTRASOUND IMAGING | Facility: CLINIC | Age: 38
Discharge: HOME | End: 2023-03-29

## 2023-03-29 ENCOUNTER — HOSPITAL ENCOUNTER (OUTPATIENT)
Dept: MAMMOGRAPHY | Facility: CLINIC | Age: 38
Discharge: HOME | End: 2023-03-29

## 2023-03-29 VITALS — DIASTOLIC BLOOD PRESSURE: 66 MMHG | SYSTOLIC BLOOD PRESSURE: 103 MMHG | HEART RATE: 53 BPM

## 2023-03-29 VITALS — WEIGHT: 198 LBS | HEIGHT: 63 IN | BODY MASS INDEX: 35.08 KG/M2

## 2023-03-29 DIAGNOSIS — R92.8 ABNORMAL ULTRASOUND OF BREAST: ICD-10-CM

## 2023-03-29 RX ORDER — LIDOCAINE HYDROCHLORIDE 10 MG/ML
5 INJECTION, SOLUTION EPIDURAL; INFILTRATION; INTRACAUDAL; PERINEURAL ONCE
Status: COMPLETED | OUTPATIENT
Start: 2023-03-29 | End: 2023-03-29

## 2023-03-29 RX ADMIN — LIDOCAINE HYDROCHLORIDE 5 ML: 10 INJECTION, SOLUTION EPIDURAL; INFILTRATION; INTRACAUDAL; PERINEURAL at 10:18

## 2023-03-29 NOTE — PROGRESS NOTES
Procedure type:    __x___ultrasound guided _____stereotactic    Breast:    _____Left __x___Right     Location: 10 oclock 9 cmfn    Needle: 12g Erinn    # of passes: 3    Clip: Ultraclip Heart    Performed by: Dr Brittney Worley held for 5 minutes by: Lynann Angelucci    Steri Strips:    __x___yes _____no    Band aid:    __x___yes_____no    Tape and guaze:    _____yes __x___no    Tolerated procedure:    __x___yes _____no

## 2023-03-29 NOTE — DISCHARGE INSTR - OTHER ORDERS
POST LARGE CORE BREAST BIOPSY PATIENT INFORMATION      Place an ice pack inside your bra over the top of the dressing every hour for 20 minutes (20 minutes on, 60 minutes off)  Do this until bedtime  Do not shower or bathe until the following morning  You may bathe your breast carefully with the steri-strips in place  Be careful    Not to loosen them  The steri-strips will fall off in 3-5 days  You may have mild discomfort, and you may have some bruising where the   Needle entered the skin  This should clear within 5-7 days  If you need medicine for discomfort, take acetaminophen products such as   Tylenol  You may also take Advil or Motrin products  Do not participate in strenuous activities such as-tennis, aerobics, skiing,  Weight lifting, etc  for 24 hours  Refrain from swimming/soaking for 72 hours  Wearing a bra for sleeping may be more comfortable for the first 24-48 hours  Watch for continued bleeding, pain or fever over 101  If any of these symptoms occur, please contact our    breast nurse navigator at the location where your biopsy was performed  During normal business hours (7:30 am-4:00 pm) please call the nurse navigator at the site where your   procedure was performed:    [address]:  [phone] or   [address]:     [phone] or [address] [facility]:    [phone] or [phone]  [address] [facility]:   [phone] or [phone]  [facility]:   [address] [place] [address] Street:     [phone]              After 4 PM - please call your physician or go to the nearest Emergency Department location  9          The final results of your biopsy are usually available within one week

## 2023-03-29 NOTE — LETTER
[facility]  [address] [facility] [address]  Dept: [phone]    March 29, 2023     Patient: Vahid You   YOB: 1985   Date of Visit: 3/29/2023       To Whom it May Concern:    Augustine You is under our professional care  She was seen in the office from 3/29/2023 to 03/29/23  She may return to work on 3/31/23  If you have any questions or concerns, please don't hesitate to call           Sincerely,          Gilford Ply

## 2023-03-31 ENCOUNTER — TELEPHONE (OUTPATIENT)
Dept: MAMMOGRAPHY | Facility: CLINIC | Age: 38
End: 2023-03-31

## 2023-04-03 ENCOUNTER — TELEPHONE (OUTPATIENT)
Dept: MAMMOGRAPHY | Facility: CLINIC | Age: 38
End: 2023-04-03

## 2023-04-17 ENCOUNTER — TELEPHONE (OUTPATIENT)
Dept: OBGYN CLINIC | Facility: CLINIC | Age: 38
End: 2023-04-17

## 2023-04-20 DIAGNOSIS — Z30.011 ENCOUNTER FOR INITIAL PRESCRIPTION OF CONTRACEPTIVE PILLS: ICD-10-CM

## 2023-04-20 RX ORDER — ACETAMINOPHEN AND CODEINE PHOSPHATE 120; 12 MG/5ML; MG/5ML
1 SOLUTION ORAL DAILY
Qty: 28 TABLET | Refills: 3 | Status: SHIPPED | OUTPATIENT
Start: 2023-04-20

## 2023-04-24 DIAGNOSIS — J45.20 MILD INTERMITTENT ASTHMA WITHOUT COMPLICATION: ICD-10-CM

## 2023-04-24 RX ORDER — ALBUTEROL SULFATE 90 UG/1
2 AEROSOL, METERED RESPIRATORY (INHALATION) EVERY 6 HOURS PRN
Qty: 18 G | Refills: 0 | Status: SHIPPED | OUTPATIENT
Start: 2023-04-24

## 2023-04-25 ENCOUNTER — OFFICE VISIT (OUTPATIENT)
Dept: OBGYN CLINIC | Facility: CLINIC | Age: 38
End: 2023-04-25

## 2023-04-25 VITALS
HEIGHT: 63 IN | WEIGHT: 195.2 LBS | DIASTOLIC BLOOD PRESSURE: 84 MMHG | SYSTOLIC BLOOD PRESSURE: 124 MMHG | BODY MASS INDEX: 34.59 KG/M2 | HEART RATE: 87 BPM

## 2023-04-25 DIAGNOSIS — N39.46 MIXED STRESS AND URGE URINARY INCONTINENCE: Primary | ICD-10-CM

## 2023-04-25 DIAGNOSIS — L23.89 ALLERGIC CONTACT DERMATITIS DUE TO OTHER AGENTS: ICD-10-CM

## 2023-04-25 DIAGNOSIS — N92.0 MENORRHAGIA WITH REGULAR CYCLE: ICD-10-CM

## 2023-04-25 RX ORDER — TOLTERODINE 4 MG/1
4 CAPSULE, EXTENDED RELEASE ORAL DAILY
Qty: 30 CAPSULE | Refills: 11 | Status: SHIPPED | OUTPATIENT
Start: 2023-04-25

## 2023-04-25 RX ORDER — MISOPROSTOL 200 UG/1
TABLET ORAL
Qty: 2 TABLET | Refills: 0 | Status: SHIPPED | OUTPATIENT
Start: 2023-04-25

## 2023-04-25 NOTE — PROGRESS NOTES
"García PLATA Day is a 40 y o  female who presents to the clinic 6 weeks status post LEEP for HGSIL   Eating a regular diet without difficulty  Bowel movements are normal  The patient is not having any pain  The following portions of the patient's history were reviewed and updated as appropriate: allergies, current medications, past family history, past medical history, past social history, past surgical history and problem list     Review of Systems  Pertinent items are noted in HPI  Objective     /84   Pulse 87   Ht 5' 3\" (1 6 m)   Wt 88 5 kg (195 lb 3 2 oz)   LMP 04/06/2023   BMI 34 58 kg/m²   General:  alert and oriented, in no acute distress   Abdomen: soft, bowel sounds active, non-tender   SSPE:   cervical crater well healed         Assessment      Doing well postoperatively  Operative findings again reviewed  Pathology report discussed  Plan     1  Continue any current medications  2  Wound care discussed  3  Activity restrictions: none  4  Follow up: 5 mos for repap     "

## 2023-04-27 ENCOUNTER — PATIENT OUTREACH (OUTPATIENT)
Dept: OBGYN CLINIC | Facility: CLINIC | Age: 38
End: 2023-04-27

## 2023-05-08 ENCOUNTER — TELEPHONE (OUTPATIENT)
Dept: OTHER | Facility: OTHER | Age: 38
End: 2023-05-08

## 2023-05-08 ENCOUNTER — PROCEDURE VISIT (OUTPATIENT)
Dept: OBGYN CLINIC | Facility: CLINIC | Age: 38
End: 2023-05-08

## 2023-05-08 VITALS
HEIGHT: 63 IN | HEART RATE: 80 BPM | BODY MASS INDEX: 34.41 KG/M2 | WEIGHT: 194.2 LBS | DIASTOLIC BLOOD PRESSURE: 74 MMHG | SYSTOLIC BLOOD PRESSURE: 115 MMHG

## 2023-05-08 DIAGNOSIS — Z30.430 ENCOUNTER FOR IUD INSERTION: Primary | ICD-10-CM

## 2023-05-08 LAB — SL AMB POCT URINE HCG: NORMAL

## 2023-05-08 RX ORDER — LEVONORGESTREL 52 MG/1
INTRAUTERINE DEVICE INTRAUTERINE
COMMUNITY
Start: 2023-04-28

## 2023-05-08 RX ORDER — NICOTINE 21 MG/24HR
PATCH, TRANSDERMAL 24 HOURS TRANSDERMAL
COMMUNITY
Start: 2023-04-05

## 2023-05-08 NOTE — PROGRESS NOTES
Iud insertions    Date/Time: 5/8/2023 10:11 AM  Performed by: Yuan Chong DO  Authorized by: Yuan Chong, DO     Verbal consent obtained?: Yes    Written consent obtained?: Yes    Risks and benefits: Risks, benefits and alternatives were discussed    Consent given by:  Patient  Time Out:     Time out: Immediately prior to the procedure a time out was called      Time out performed at:  5/8/2023 9:44 AM  Patient states understanding of procedure being performed: Yes    Patient's understanding of procedure matches consent: Yes    Procedure consent matches procedure scheduled: Yes    Relevant documents present and verified: Yes    Patient identity confirmed:  Verbally with patient and provided demographic data  Procedure:     Negative urine pregnancy test: yes      Cervix cleaned and prepped: yes      Speculum placed in vagina: yes      Tenaculum applied to cervix: yes      Uterus sounded: yes      Uterus sound depth (cm):  6    IUD inserted with no complications: yes      IUD type:  Mirena    Strings trimmed: yes    Post-procedure:     Patient tolerated procedure well: yes      Patient will follow up after next period: yes    Comments:      Depression Screening Follow-up Plan: Patient's depression screening was positive with a PHQ-2 score of   Their PHQ-9 score was 7  Clinically patient does not have depression  No treatment is required      LOT#  [id number]  EXP:  April 2025

## 2023-05-08 NOTE — PATIENT INSTRUCTIONS
Thank you for your confidence in our team    We appreciate you and welcome your feedback  If you receive a survey from us, please take a few moments to let us know how we are doing     Sincerely,  Alex Jara, DO

## 2023-05-24 DIAGNOSIS — J45.20 MILD INTERMITTENT ASTHMA WITHOUT COMPLICATION: ICD-10-CM

## 2023-05-26 RX ORDER — ALBUTEROL SULFATE 90 UG/1
AEROSOL, METERED RESPIRATORY (INHALATION)
Qty: 18 G | Refills: 0 | Status: SHIPPED | OUTPATIENT
Start: 2023-05-26

## 2023-06-07 ENCOUNTER — OFFICE VISIT (OUTPATIENT)
Dept: OBGYN CLINIC | Facility: CLINIC | Age: 38
End: 2023-06-07

## 2023-06-07 ENCOUNTER — PATIENT OUTREACH (OUTPATIENT)
Dept: OBGYN CLINIC | Facility: CLINIC | Age: 38
End: 2023-06-07

## 2023-06-07 VITALS
SYSTOLIC BLOOD PRESSURE: 110 MMHG | BODY MASS INDEX: 34.66 KG/M2 | HEIGHT: 63 IN | HEART RATE: 52 BPM | DIASTOLIC BLOOD PRESSURE: 73 MMHG | WEIGHT: 195.6 LBS

## 2023-06-07 DIAGNOSIS — Z30.431 IUD CHECK UP: Primary | ICD-10-CM

## 2023-06-07 DIAGNOSIS — F31.9 BIPOLAR 1 DISORDER (HCC): ICD-10-CM

## 2023-06-07 DIAGNOSIS — Z13.31 POSITIVE SCREENING FOR DEPRESSION ON 2-ITEM PATIENT HEALTH QUESTIONNAIRE (PHQ-2): Primary | ICD-10-CM

## 2023-06-07 DIAGNOSIS — J45.20 MILD INTERMITTENT ASTHMA WITHOUT COMPLICATION: ICD-10-CM

## 2023-06-07 PROCEDURE — 99213 OFFICE O/P EST LOW 20 MIN: CPT | Performed by: OBSTETRICS & GYNECOLOGY

## 2023-06-07 RX ORDER — RISPERIDONE 4 MG/1
4 TABLET, ORALLY DISINTEGRATING ORAL 2 TIMES DAILY
Qty: 60 TABLET | Refills: 2 | Status: SHIPPED | OUTPATIENT
Start: 2023-06-07

## 2023-06-07 RX ORDER — RISPERIDONE 4 MG/1
10 TABLET ORAL DAILY
Status: CANCELLED | OUTPATIENT
Start: 2023-06-07

## 2023-06-07 NOTE — PROGRESS NOTES
ROSALES HORAN saw pt in the office, she was going to [facility], however, hours did not work with her work schedule  She works until [employer] everyday  ROSALES HORAN let her know [facility] has a new male therapist who works late hours, provided her with their contact and enc her to call, she will call today  ROSALES HORAN will f/u in one week

## 2023-06-08 NOTE — PATIENT INSTRUCTIONS
Thank you for your confidence in our team    We appreciate you and welcome your feedback  If you receive a survey from us, please take a few moments to let us know how we are doing     Sincerely,  Hollie Johnson, DO

## 2023-06-08 NOTE — PROGRESS NOTES
N Romulo Colunga A Day is a 40 y o  female who presents today for iud string check  Patient reports it as follows:    Past Medical History:   Diagnosis Date   • Anxiety    • Asthma    • Bipolar 1 disorder ([facility] [address] )    • Depression    • IBS (irritable bowel syndrome)    • Insomnia    • Migraine    • PTSD (post-traumatic stress disorder)      Past Surgical History:   Procedure Laterality Date   • ANKLE HARDWARE REMOVAL Right    • ANKLE SURGERY Right    • CHOLECYSTECTOMY     • FOOT SURGERY Left     with hardware   • MOUTH SURGERY      metal placed from broken jaw   • ORIF MANDIBULAR FRACTURE Bilateral 2019    Procedure: MANDIBLE CARROLL REMOVAL AND HARWARE REMOVAL FROM LEFT MANDIBLE ALVEOLAR RIDGE;  Surgeon: Marcus Arredondo DMD;  Location:  MAIN OR;  Service: Maxillofacial   • OTHER SURGICAL HISTORY      unlisted craniofacial and maxillofacial procedure   • ND COLPOSCOPY CERVIX VAG LOOP ELTRD BX CERVIX N/A 3/17/2023    Procedure: BIOPSY LEEP CERVIX;  Surgeon: Cynthia Minor DO;  [facility];  Service: Gynecology   • US GUIDED BREAST BIOPSY RIGHT COMPLETE Right 3/29/2023     OB History        4    Para   1    Term   1            AB   3    Living   1       SAB   2    IAB   1    Ectopic        Multiple        Live Births   1               Social History     Tobacco Use   • Smoking status: Every Day     Packs/day: 0 50     Years: 24 00     Total pack years: 12 00     Types: Cigarettes     Start date:    • Smokeless tobacco: Never   • Tobacco comments:     2-3 cigarettes a day   Vaping Use   • Vaping Use: Some days   • Substances: Flavoring   Substance Use Topics   • Alcohol use: Not Currently     Alcohol/week: 2 0 standard drinks of alcohol     Types: 2 Glasses of wine per week   • Drug use: No     Comment: Denied history of drug use     Social History     Substance and Sexual Activity   Sexual Activity Yes   • Partners: Male   • Birth control/protection: I U D  "      Current Outpatient Medications   Medication Instructions   • acetaminophen (TYLENOL) 650 mg, Oral, Every 8 hours PRN   • albuterol (PROVENTIL HFA,VENTOLIN HFA) 90 mcg/act inhaler INHALE 2 PUFFS EVERY 6 HOURS AS NEEDED FOR WHEEZING   • ePHEDrine HCl (Primatene) 12 5 MG TABS Oral, Daily PRN   • hydrocortisone (WESTCORT) 0 2 % cream Topical, 2 times daily   • hydrocortisone 2 5 % cream Topical, 2 times daily   • Mirena, 52 MG, 20 MCG/DAY IUD No dose, route, or frequency recorded  • miSOPROStol (Cytotec) 200 mcg tablet Take one tablet orally the night prior to the procedure and take one tablet insert intravaginally the morning of the procedure at 5:30am   • nicotine (NICODERM CQ) 21 mg/24 hr TD 24 hr patch No dose, route, or frequency recorded  • nicotine polacrilex (COMMIT) 4 mg, Mouth/Throat, As needed   • norethindrone (ORTHO MICRONOR) 0 35 mg, Oral, Daily   • ondansetron (ZOFRAN) 4 mg, Oral, Every 8 hours PRN   • risperiDONE (RISPERDAL M-TAB) 4 mg, Oral, 2 times daily   • risperiDONE (RISPERDAL) 10 mg, Oral, Daily   • tolterodine (DETROL LA) 4 mg, Oral, Daily       History of Present Illness:   Patient present for mirena iud string check with no complaint  Patient requesting a script for bipolar disorder unable to get in to see a psychiatrist in a couple of weeks and is out of her medication  Review of Systems:  Review of Systems   Genitourinary:        Spotting   All other systems reviewed and are negative  Physical Exam:  /73   Pulse (!) 52   Ht 5' 3\" (1 6 m)   Wt 88 7 kg (195 lb 9 6 oz)   LMP 05/30/2023   BMI 34 65 kg/m²   Physical Exam  Constitutional:       Appearance: Normal appearance  Genitourinary:      Bladder and urethral meatus normal       No lesions in the vagina  Right Labia: No rash, tenderness or lesions  Left Labia: No tenderness, lesions or rash  Vaginal discharge and bleeding present  Cervical discharge present  No cervical motion tenderness   " IUD strings visualized  No urethral tenderness or mass present  Neurological:      Mental Status: She is alert  Vitals reviewed  Assessment:   1  Iud string check    Plan:   1  Return to office keep scheduled appt for repap  Reviewed with patient [facility], but that they will not necessarily be reviewed by me immediately  Explained that I will review results at my earliest opportunity and contact patient appropriately

## 2023-06-09 RX ORDER — ALBUTEROL SULFATE 90 UG/1
AEROSOL, METERED RESPIRATORY (INHALATION)
Qty: 18 G | Refills: 0 | Status: SHIPPED | OUTPATIENT
Start: 2023-06-09

## 2023-06-14 ENCOUNTER — PATIENT OUTREACH (OUTPATIENT)
Dept: OBGYN CLINIC | Facility: CLINIC | Age: 38
End: 2023-06-14

## 2023-06-14 NOTE — PROGRESS NOTES
ROSALES HORAN called Hany Arevalo to see if she was able to schedule with [facility] for therapy, left  for a return call

## 2023-06-21 ENCOUNTER — PATIENT OUTREACH (OUTPATIENT)
Dept: OBGYN CLINIC | Facility: CLINIC | Age: 38
End: 2023-06-21

## 2023-06-23 DIAGNOSIS — F31.9 BIPOLAR 1 DISORDER (HCC): Primary | ICD-10-CM

## 2023-06-23 RX ORDER — RISPERIDONE 4 MG/1
4 TABLET ORAL DAILY
Qty: 30 TABLET | Refills: 6 | Status: SHIPPED | OUTPATIENT
Start: 2023-06-23

## 2023-06-23 NOTE — PROGRESS NOTES
"ROSALES HORAN called Calvin Arrieta on 6/21, she asked if ROSALES MARLINE could call back in 10 minutes to which I agreed but then was unable to call back  ROSALES HORAN called back again today 6/23    Pt reports that she does have an upcoming appointment with [facility], she does not remember the exact date, she thinks it is sometime next week  We reviewed that she will likely have two therapy appointments and then a psychiatry appointment after that, she reports this is what she was told by them  She will either call me back or send her appointment dates to us via my chart, she will sign a record release at [facility] for Dr Noy Stoll to obtain her psychiatric notes as needed  Dr houston prescribed her risperidone 4mg, nilesh reports that when she went to pick it up they stated that it was not covered or that it needed a prior auth, ROSALES MARLINE called the [facility] on [address] the disintegrating tab needs a prior auth but regular tabs do not, ROSALES HORAN called Calvin Arrieta back to see if she's okay with tabs being switched to swallow able pills  Dr Maycol Stubbss aware and I will update her on what is needed once I hear back from [facility]  Voice message from [facility]: \"Hi Idalmis, it's Calvin Arrieta returning your call  I'm able to swallow pills, so if you want to send that over, I'd appreciate it  \" - ROSALES Horan notified Dr Noy Stoll who will change the rx    "

## 2023-06-27 ENCOUNTER — PATIENT OUTREACH (OUTPATIENT)
Dept: OBGYN CLINIC | Facility: CLINIC | Age: 38
End: 2023-06-27

## 2023-06-27 NOTE — PROGRESS NOTES
Maribel Crespo today to check in and see if she was able to  the medication dr houston called in on Friday and also see when her appointments are at [facility], left VM

## 2023-06-29 DIAGNOSIS — J45.20 MILD INTERMITTENT ASTHMA WITHOUT COMPLICATION: ICD-10-CM

## 2023-06-29 RX ORDER — ALBUTEROL SULFATE 90 UG/1
2 AEROSOL, METERED RESPIRATORY (INHALATION) EVERY 6 HOURS PRN
Qty: 18 G | Refills: 0 | Status: SHIPPED | OUTPATIENT
Start: 2023-06-29

## 2023-07-11 ENCOUNTER — PATIENT OUTREACH (OUTPATIENT)
Dept: OBGYN CLINIC | Facility: CLINIC | Age: 38
End: 2023-07-11

## 2023-07-11 NOTE — PROGRESS NOTES
ROSALES HORAN f/u with [facility] today to see if she started care with [facility], she was supposed to have an appointment last week but had to rush her great aunt to the hospital. She reports she was able to re-schedule and that [facility] was very understanding, she is scheduled on 7/20 for the intake, ROSALES HORAN will call her after that time to see when her psychiatry appointment is scheduled for.

## 2023-07-12 DIAGNOSIS — L23.89 ALLERGIC CONTACT DERMATITIS DUE TO OTHER AGENTS: ICD-10-CM

## 2023-07-27 ENCOUNTER — PATIENT OUTREACH (OUTPATIENT)
Dept: OBGYN CLINIC | Facility: CLINIC | Age: 38
End: 2023-07-27

## 2023-07-28 NOTE — PROGRESS NOTES
ROSALES CM left VM with Vivian to see how things are going with [facility] and if she was able to start care with the psychiatrist.

## 2023-07-29 DIAGNOSIS — J45.20 MILD INTERMITTENT ASTHMA WITHOUT COMPLICATION: ICD-10-CM

## 2023-07-29 DIAGNOSIS — L23.89 ALLERGIC CONTACT DERMATITIS DUE TO OTHER AGENTS: ICD-10-CM

## 2023-08-01 RX ORDER — ALBUTEROL SULFATE 90 UG/1
AEROSOL, METERED RESPIRATORY (INHALATION)
Qty: 18 G | Refills: 1 | Status: SHIPPED | OUTPATIENT
Start: 2023-08-01

## 2023-08-03 ENCOUNTER — PATIENT OUTREACH (OUTPATIENT)
Dept: OBGYN CLINIC | Facility: CLINIC | Age: 38
End: 2023-08-03

## 2023-08-03 NOTE — PROGRESS NOTES
Left VM to check in and see how things are going with [facility] and if she's met with the psychiatrist yet.

## 2023-08-04 ENCOUNTER — OFFICE VISIT (OUTPATIENT)
Dept: OBGYN CLINIC | Facility: CLINIC | Age: 38
End: 2023-08-04

## 2023-08-04 VITALS
SYSTOLIC BLOOD PRESSURE: 108 MMHG | HEART RATE: 79 BPM | BODY MASS INDEX: 33.23 KG/M2 | WEIGHT: 187.6 LBS | DIASTOLIC BLOOD PRESSURE: 58 MMHG

## 2023-08-04 DIAGNOSIS — N39.3 STRESS INCONTINENCE: Primary | ICD-10-CM

## 2023-08-04 PROCEDURE — 99213 OFFICE O/P EST LOW 20 MIN: CPT | Performed by: OBSTETRICS & GYNECOLOGY

## 2023-08-04 NOTE — PROGRESS NOTES
Urogynecology - New Patient Visit  Shira Luo Day   [id number]    Malay-speaking: no     Chief complaint: urinary leakage     HPI: 40 y.o. presents for symptoms of urinary incontinence, which she states that she's had since the birth of her child in . She describes her symptoms starting with IRMA which progressively worsened over time.      Referred by gynecologist:  Yes - Dr. Chris Whitten   History of prolapse surgery: no  Primary care doctor: yes    Prolapse symptoms  Bulge: no   Pressure: yes  Rubbing on clothing: no  Stenting for urine: no  Stenting for stool: no  Profession:      Urinary symptoms  Urgency: yes  Frequency: yes 13 / day  Incontinence with stress (exercise, valsalva, laugh, sneeze, cough): yes  Incontinence with urge: yes  #pads used/24 hours: yes  Liners/pads/diapers   Postural incontinence: yes  Nocturia:yes 3 / night - insensible losses   Dysuria: yes - when I push to pee   Hematuria:no  Incomplete emptying: yes  Slow stream:yes  Hesitancy: yes  Straining with urination: yes  Bladder irritants: soda with meals, juice, water, tea, coffee   Smoker: less than 1/2 ppd    Defecatory symptoms  Constipation:yes - 5 to 7 per week   Frequency:no  1/day  Urgency: yes  Fecal Incontinence: yes  Gas incontinence:  yes  Loose stools:  Yes, at times  Bowel regimen:  yes Immodium     Gynecologic history  Pap history: LEEP with SHREYA 2-3 in 2023  Sexually active: yes - IUD in place   Dyspareunia: no     OB History    Para Term  AB Living   4 1 1   3 1   SAB IAB Ectopic Multiple Live Births   2 1     1      # Outcome Date GA Lbr Mikhail/2nd Weight Sex Delivery Anes PTL Lv   4 IAB            3 SAB            2 SAB            1 Term                OB History   P1 , VAVD   Lacerations yes   OVD yes - episiotomy   Largest baby 8lb7oz    Past Medical History:   Diagnosis Date   • Anxiety    • Asthma    • Bipolar 1 disorder ([address])    • Depression    • IBS (irritable bowel syndrome)    • Insomnia • Migraine    • PTSD (post-traumatic stress disorder)        Past Surgical History:   Procedure Laterality Date   • ANKLE HARDWARE REMOVAL Right    • ANKLE SURGERY Right    • CHOLECYSTECTOMY     • FOOT SURGERY Left     with hardware   • MOUTH SURGERY  2011    metal placed from broken jaw   • ORIF MANDIBULAR FRACTURE Bilateral 06/03/2019    Procedure: [place] FROM LEFT MANDIBLE ALVEOLAR RIDGE;  Surgeon: Fam Bradford DMD;  Location:  MAIN OR;  Service: Maxillofacial   • OTHER SURGICAL HISTORY      unlisted craniofacial and maxillofacial procedure   • SD COLPOSCOPY CERVIX VAG LOOP ELTRD BX CERVIX N/A 3/17/2023    Procedure: BIOPSY LEEP CERVIX;  Surgeon: Zachariah Lantigua DO;  Location: [facility];  Service: Gynecology   • US GUIDED BREAST BIOPSY RIGHT COMPLETE Right 3/29/2023       Family History   Problem Relation Age of Onset   • Lung cancer Mother    • Brain cancer Mother    • Bone cancer Mother    • Lung cancer Father    • Bone cancer Father    • Lung cancer Paternal Grandfather    • Liver cancer Maternal Uncle    • Lung cancer Paternal Uncle    • No Known Problems Paternal Aunt    • No Known Problems Paternal Aunt        Social History     Socioeconomic History   • Marital status: Single     Spouse name: Not on file   • Number of children: Not on file   • Years of education: Not on file   • Highest education level: Not on file   Occupational History   • Not on file   Tobacco Use   • Smoking status: Every Day     Packs/day: 0.50     Years: 24.00     Total pack years: 12.00     Types: Cigarettes     Start date: 1999   • Smokeless tobacco: Never   • Tobacco comments:     2-3 cigarettes a day   Vaping Use   • Vaping Use: Some days   • Substances: Flavoring   Substance and Sexual Activity   • Alcohol use: Not Currently     Alcohol/week: 2.0 standard drinks of alcohol     Types: 2 Glasses of wine per week   • Drug use: No     Comment: Denied history of drug use   • Sexual activity: Yes     Partners: Male     Birth control/protection: I.U.D. Other Topics Concern   • Not on file   Social History Narrative   • Not on file     Social Determinants of Health     Financial Resource Strain: Low Risk  (6/7/2023)    Overall Financial Resource Strain (CARDIA)    • Difficulty of Paying Living Expenses: Not hard at all   Food Insecurity: No Food Insecurity (6/7/2023)    Hunger Vital Sign    • Worried About Running Out of Food in the Last Year: Never true    • Ran Out of Food in the Last Year: Never true   Transportation Needs: Unmet Transportation Needs (6/7/2023)    PRAPARE - Transportation    • Lack of Transportation (Medical):  Yes    • Lack of Transportation (Non-Medical): Yes   Physical Activity: Not on file   Stress: Not on file   Social Connections: Not on file   Intimate Partner Violence: Not on file   Housing Stability: Not on file       Current Outpatient Medications on File Prior to Visit   Medication Sig Dispense Refill   • acetaminophen (TYLENOL) 650 mg CR tablet Take 1 tablet (650 mg total) by mouth every 8 (eight) hours as needed for mild pain 30 tablet 0   • albuterol (PROVENTIL HFA,VENTOLIN HFA) 90 mcg/act inhaler INHALE 2 PUFFS EVERY 6 HOURS AS NEEDED FOR WHEEZING 18 g 1   • ePHEDrine HCl (Primatene) 12.5 MG TABS Take by mouth daily as needed     • hydrocortisone (WESTCORT) 0.2 % cream Apply topically 2 (two) times a day 45 g 0   • hydrocortisone 2.5 % cream Apply topically 2 (two) times a day 30 g 0   • Mirena, 52 MG, 20 MCG/DAY IUD      • miSOPROStol (Cytotec) 200 mcg tablet Take one tablet orally the night prior to the procedure and take one tablet insert intravaginally the morning of the procedure at 5:30am 2 tablet 0   • nicotine (NICODERM CQ) 21 mg/24 hr TD 24 hr patch      • nicotine polacrilex (COMMIT) 4 MG lozenge Apply 1 lozenge (4 mg total) to the mouth or throat as needed for smoking cessation 100 each 1   • norethindrone (Ortho Micronor) 0.35 MG tablet Take 1 tablet (0.35 mg total) by mouth daily 28 tablet 3   • ondansetron (ZOFRAN) 4 mg tablet Take 1 tablet (4 mg total) by mouth every 8 (eight) hours as needed for nausea or vomiting 20 tablet 0   • risperiDONE (RisperDAL) 4 mg tablet Take 1 tablet (4 mg total) by mouth daily 30 tablet 6   • tolterodine (DETROL LA) 4 mg 24 hr capsule Take 1 capsule (4 mg total) by mouth daily 30 capsule 11     No current facility-administered medications on file prior to visit. Allergies   Allergen Reactions   • Codeine GI Intolerance   • Fentanyl      Flushing   • Penicillins Headache, Other (See Comments) and Vomiting     Severe vomitting     • Naproxen Rash   • Wellbutrin [Bupropion] Rash       Physical exam:    Vitals:    08/04/23 1104   BP: 108/58   Pulse: 79       Physical Exam  Constitutional:       General: She is not in acute distress. Appearance: Normal appearance. She is obese. She is not ill-appearing, toxic-appearing or diaphoretic. Genitourinary:      No vaginal discharge or tenderness. Posterior vaginal prolapse present. No vaginal atrophy present. Cervix is parous. IUD strings visualized. HENT:      Head: Normocephalic and atraumatic. Pulmonary:      Effort: Pulmonary effort is normal. No respiratory distress. Abdominal:      Palpations: Abdomen is soft. Tenderness: There is no abdominal tenderness. Musculoskeletal:      Right lower leg: No edema. Left lower leg: No edema. Neurological:      General: No focal deficit present. Mental Status: She is alert and oriented to person, place, and time. Psychiatric:         Mood and Affect: Mood normal.         Behavior: Behavior normal.         Thought Content:  Thought content normal.         Judgment: Judgment normal.       POP-Q Assessment  Aa    -3 Ba   -2 C   -6   gh   3 pb   2 TVL    10   Ap   0 Bp    0 D   -8       Kegel strength: 1.5    Q-tip test: Resting degree of -10 with straining degree of 10    Q-tip with decreased resistance: no    Posterior wall relaxation: yes     Assessment:  40 y.o. G1 with mixed incontinence, IRMA>UUI with large volume losses and postural incontinence, asymptomatic grade 2 rectocele and defecation dysfunction. Plan:    IRMA  Consider urethral bulking given lack of urethral hypermobility and normal urethral resistance. Discussed with Dr. Leretha Lennox. Defecation Dysfunction  Recommend daily Fiber gummies     OAB  Detrol started 4 months ago with some improvement     Hypertonic pelvic floor   May benefit from relaxation focused PT eventually and/or Botox     Over 50% of time spent on counseling and coordination of care.

## 2023-08-15 ENCOUNTER — PATIENT OUTREACH (OUTPATIENT)
Dept: OBGYN CLINIC | Facility: CLINIC | Age: 38
End: 2023-08-15

## 2023-08-15 NOTE — PROGRESS NOTES
Spoke to [place], she reports things are going really well with preventive measures, she has one more therapy appointment until she starts care with the psychiatrist. Herminio Mcdonough CM reviewed that once the psychiatrist starts care he/she will be the prescriber for any mental health medication. Pt verbalized understanding.

## 2023-08-18 ENCOUNTER — DOCUMENTATION (OUTPATIENT)
Dept: GYNECOLOGY | Facility: CLINIC | Age: 38
End: 2023-08-18

## 2023-09-20 DIAGNOSIS — J45.20 MILD INTERMITTENT ASTHMA WITHOUT COMPLICATION: ICD-10-CM

## 2023-09-21 RX ORDER — ALBUTEROL SULFATE 90 UG/1
AEROSOL, METERED RESPIRATORY (INHALATION)
Qty: 18 G | Refills: 1 | Status: SHIPPED | OUTPATIENT
Start: 2023-09-21

## 2023-10-13 ENCOUNTER — OFFICE VISIT (OUTPATIENT)
Dept: OBGYN CLINIC | Facility: CLINIC | Age: 38
End: 2023-10-13

## 2023-10-13 VITALS
DIASTOLIC BLOOD PRESSURE: 78 MMHG | WEIGHT: 185 LBS | SYSTOLIC BLOOD PRESSURE: 122 MMHG | HEART RATE: 82 BPM | BODY MASS INDEX: 32.77 KG/M2

## 2023-10-13 DIAGNOSIS — N39.3 STRESS INCONTINENCE: Primary | ICD-10-CM

## 2023-10-13 RX ORDER — SULFAMETHOXAZOLE AND TRIMETHOPRIM 800; 160 MG/1; MG/1
1 TABLET ORAL EVERY 12 HOURS SCHEDULED
Qty: 10 TABLET | Refills: 0 | Status: SHIPPED | OUTPATIENT
Start: 2023-10-22 | End: 2023-10-27

## 2023-10-13 NOTE — LETTER
To whom it may concern:    Zamzam You was present for her appointment today on 10/13/2023 at [address]     If you have any questions please feel free to call.     Sincerely,    Sunny Fofana MD

## 2023-10-13 NOTE — PROGRESS NOTES
Preoperative History and Physical  Procedure:  Urethral bulking    DOS:  10/24/2023                                                                 Location: [facility] You is a 45 y.o.  who presents for preoperative history and physical.  Please see previous clinic notes for full details. Briefly, Radha You has stress urinary incontinence. She will be undergoing urethral bulking. She states that she had been started on Detrol by Dr. Briana Starkey in . She states that her incontinence has improved. She denies having urge symptoms but states that this medication has helped where she is now losing less quantity of urine. She is still leaking 3-4 times a day and would like to move forward with the procedure. She denies any changes in her interim health since she was last seen.     Past Medical History:   Diagnosis Date    Anxiety     Asthma     Bipolar 1 disorder (HCC)     Depression     IBS (irritable bowel syndrome)     Insomnia     Migraine     PTSD (post-traumatic stress disorder)      Past Surgical History:   Procedure Laterality Date    ANKLE HARDWARE REMOVAL Right     ANKLE SURGERY Right     CHOLECYSTECTOMY      FOOT SURGERY Left     with hardware    MOUTH SURGERY      metal placed from broken jaw    ORIF MANDIBULAR FRACTURE Bilateral 2019    Procedure: [place] FROM LEFT MANDIBLE ALVEOLAR RIDGE;  Surgeon: Velma Thacker DMD;  Location:  [facility] OR;  Service: Maxillofacial    OTHER SURGICAL HISTORY      unlisted craniofacial and maxillofacial procedure    MD COLPOSCOPY CERVIX VAG LOOP ELTRD BX CERVIX N/A 3/17/2023    Procedure: BIOPSY LEEP CERVIX;  Surgeon: Adam Feng DO;  Location: [facility];  Service: Gynecology    US GUIDED BREAST BIOPSY RIGHT COMPLETE Right 3/29/2023       Family History   Problem Relation Age of Onset    Lung cancer Mother     Brain cancer Mother     Bone cancer Mother     Lung cancer Father     Bone cancer Father Lung cancer Paternal Grandfather     Liver cancer Maternal Uncle     Lung cancer Paternal Uncle     No Known Problems Paternal Aunt     No Known Problems Paternal Aunt      Social History:  Social History     Socioeconomic History    Marital status: Single     Spouse name: Not on file    Number of children: Not on file    Years of education: Not on file    Highest education level: Not on file   Occupational History    Not on file   Tobacco Use    Smoking status: Every Day     Packs/day: 0.50     Years: 24.00     Total pack years: 12.00     Types: Cigarettes     Start date: 1999    Smokeless tobacco: Never    Tobacco comments:     2-3 cigarettes a day   Vaping Use    Vaping Use: Some days    Substances: Flavoring   Substance and Sexual Activity    Alcohol use: Not Currently     Alcohol/week: 2.0 standard drinks of alcohol     Types: 2 Glasses of wine per week    Drug use: No     Comment: Denied history of drug use    Sexual activity: Yes     Partners: Male     Birth control/protection: I.U.D.    Other Topics Concern    Not on file   Social History Narrative    Not on file     Social Determinants of Health     Financial Resource Strain: Low Risk  (6/7/2023)    Overall Financial Resource Strain (CARDIA)     Difficulty of Paying Living Expenses: Not hard at all   Food Insecurity: No Food Insecurity (6/7/2023)    Hunger Vital Sign     Worried About Running Out of Food in the Last Year: Never true     Ran Out of Food in the Last Year: Never true   Transportation Needs: Unmet Transportation Needs (6/7/2023)    PRAPARE - Transportation     Lack of Transportation (Medical): Yes     Lack of Transportation (Non-Medical): Yes   Physical Activity: Not on file   Stress: Not on file   Social Connections: Not on file   Intimate Partner Violence: Not on file   Housing Stability: Not on file     Allergies   Allergen Reactions    Codeine GI Intolerance    Fentanyl      Flushing    Penicillins Headache, Other (See Comments) and Vomiting     Severe vomitting      Naproxen Rash    Wellbutrin [Bupropion] Rash       Current Outpatient Medications:     acetaminophen (TYLENOL) 650 mg CR tablet, Take 1 tablet (650 mg total) by mouth every 8 (eight) hours as needed for mild pain, Disp: 30 tablet, Rfl: 0    albuterol (PROVENTIL HFA,VENTOLIN HFA) 90 mcg/act inhaler, TAKE 2 PUFFS BY MOUTH EVERY 6 HOURS AS NEEDED FOR WHEEZE, Disp: 18 g, Rfl: 1    ePHEDrine HCl (Primatene) 12.5 MG TABS, Take by mouth daily as needed, Disp: , Rfl:     hydrocortisone (WESTCORT) 0.2 % cream, Apply topically 2 (two) times a day, Disp: 45 g, Rfl: 0    hydrocortisone 2.5 % cream, Apply topically 2 (two) times a day, Disp: 30 g, Rfl: 0    Mirena, 52 MG, 20 MCG/DAY IUD, , Disp: , Rfl:     miSOPROStol (Cytotec) 200 mcg tablet, Take one tablet orally the night prior to the procedure and take one tablet insert intravaginally the morning of the procedure at 5:30am, Disp: 2 tablet, Rfl: 0    nicotine (NICODERM CQ) 21 mg/24 hr TD 24 hr patch, , Disp: , Rfl:     nicotine polacrilex (COMMIT) 4 MG lozenge, Apply 1 lozenge (4 mg total) to the mouth or throat as needed for smoking cessation, Disp: 100 each, Rfl: 1    norethindrone (Ortho Micronor) 0.35 MG tablet, Take 1 tablet (0.35 mg total) by mouth daily, Disp: 28 tablet, Rfl: 3    ondansetron (ZOFRAN) 4 mg tablet, Take 1 tablet (4 mg total) by mouth every 8 (eight) hours as needed for nausea or vomiting, Disp: 20 tablet, Rfl: 0    risperiDONE (RisperDAL) 4 mg tablet, Take 1 tablet (4 mg total) by mouth daily, Disp: 30 tablet, Rfl: 6    tolterodine (DETROL LA) 4 mg 24 hr capsule, Take 1 capsule (4 mg total) by mouth daily, Disp: 30 capsule, Rfl: 11    Review of Systems:  A complete review of systems was performed and was negative, except as listed. Physical Exam:  There were no vitals taken for this visit. GEN: The patient was alert and oriented x3, pleasant well-appearing female in no acute distress.    HEENT:  Unremarkable, no anterior or posterior lymphadenopathy, no thyromegaly  CV:  RRR, no murmurs  RESP:  Clear to auscultation bilaterally  BREAST:  Deferred  ABD:  Soft, nontender, nondistended, normoactive bowel sounds  EXT:  WWP, nontender, no edema  BACK:  No CVA tenderness, no tenderness to palpation along spine  PELVIC:  Deferred today. Assessment & Plan: Liz You is a 45 y.o. [id number] with stress urinary incontinence, she is planning urethral bulking on 10/24/23.    -Discussed need for urine culture as well as starting Bactrim x5 days 2 days before surgery and continuing until the 5 days are completed.    -If urine culture results with positive UTI patient understands that the procedure may need to be delayed  [facility] to call with final time  -Hibiclens and Ariella were given along with pre-operative instructions

## 2023-10-16 ENCOUNTER — HOSPITAL ENCOUNTER (EMERGENCY)
Facility: HOSPITAL | Age: 38
Discharge: HOME | End: 2023-10-16
Attending: EMERGENCY MEDICINE
Payer: MEDICARE

## 2023-10-16 VITALS
RESPIRATION RATE: 18 BRPM | TEMPERATURE: 98 F | SYSTOLIC BLOOD PRESSURE: 105 MMHG | DIASTOLIC BLOOD PRESSURE: 74 MMHG | OXYGEN SATURATION: 96 % | HEART RATE: 64 BPM

## 2023-10-16 DIAGNOSIS — G43.809 OTHER MIGRAINE WITHOUT STATUS MIGRAINOSUS, NOT INTRACTABLE: Primary | ICD-10-CM

## 2023-10-16 DIAGNOSIS — R11.2 NAUSEA AND VOMITING, UNSPECIFIED VOMITING TYPE: ICD-10-CM

## 2023-10-16 LAB
ANION GAP SERPL CALCULATED.3IONS-SCNC: 5 MMOL/L
BASOPHILS # BLD AUTO: 0.03 THOUSANDS/ÂΜL (ref 0–0.1)
BASOPHILS NFR BLD AUTO: 0 % (ref 0–1)
BUN SERPL-MCNC: 7 MG/DL (ref 5–25)
CALCIUM SERPL-MCNC: 8.8 MG/DL (ref 8.4–10.2)
CHLORIDE SERPL-SCNC: 109 MMOL/L (ref 96–108)
CO2 SERPL-SCNC: 22 MMOL/L (ref 21–32)
CREAT SERPL-MCNC: 0.67 MG/DL (ref 0.6–1.3)
EOSINOPHIL # BLD AUTO: 0.03 THOUSAND/ÂΜL (ref 0–0.61)
EOSINOPHIL NFR BLD AUTO: 0 % (ref 0–6)
ERYTHROCYTE [DISTWIDTH] IN BLOOD BY AUTOMATED COUNT: 13 % (ref 11.6–15.1)
GFR SERPL CREATININE-BSD FRML MDRD: 111 ML/MIN/1.73SQ M
GLUCOSE SERPL-MCNC: 121 MG/DL (ref 65–140)
HCG SERPL QL: NEGATIVE
HCT VFR BLD AUTO: 42.7 % (ref 34.8–46.1)
HGB BLD-MCNC: 14.4 G/DL (ref 11.5–15.4)
IMM GRANULOCYTES # BLD AUTO: 0.04 THOUSAND/UL (ref 0–0.2)
IMM GRANULOCYTES NFR BLD AUTO: 0 % (ref 0–2)
LYMPHOCYTES # BLD AUTO: 1.36 THOUSANDS/ÂΜL (ref 0.6–4.47)
LYMPHOCYTES NFR BLD AUTO: 11 % (ref 14–44)
MCH RBC QN AUTO: 32.8 PG (ref 26.8–34.3)
MCHC RBC AUTO-ENTMCNC: 33.7 G/DL (ref 31.4–37.4)
MCV RBC AUTO: 97 FL (ref 82–98)
MONOCYTES # BLD AUTO: 0.55 THOUSAND/ÂΜL (ref 0.17–1.22)
MONOCYTES NFR BLD AUTO: 5 % (ref 4–12)
NEUTROPHILS # BLD AUTO: 10.24 THOUSANDS/ÂΜL (ref 1.85–7.62)
NEUTS SEG NFR BLD AUTO: 84 % (ref 43–75)
NRBC BLD AUTO-RTO: 0 /100 WBCS
PLATELET # BLD AUTO: 190 THOUSANDS/UL (ref 149–390)
PMV BLD AUTO: 10.4 FL (ref 8.9–12.7)
POTASSIUM SERPL-SCNC: 4 MMOL/L (ref 3.5–5.3)
RBC # BLD AUTO: 4.39 MILLION/UL (ref 3.81–5.12)
SODIUM SERPL-SCNC: 136 MMOL/L (ref 135–147)
WBC # BLD AUTO: 12.25 THOUSAND/UL (ref 4.31–10.16)

## 2023-10-16 PROCEDURE — 80048 BASIC METABOLIC PNL TOTAL CA: CPT | Performed by: PHYSICIAN ASSISTANT

## 2023-10-16 PROCEDURE — 84703 CHORIONIC GONADOTROPIN ASSAY: CPT | Performed by: PHYSICIAN ASSISTANT

## 2023-10-16 PROCEDURE — 85025 COMPLETE CBC W/AUTO DIFF WBC: CPT | Performed by: PHYSICIAN ASSISTANT

## 2023-10-16 PROCEDURE — 36415 COLL VENOUS BLD VENIPUNCTURE: CPT | Performed by: PHYSICIAN ASSISTANT

## 2023-10-16 RX ORDER — DIPHENHYDRAMINE HYDROCHLORIDE 50 MG/ML
25 INJECTION INTRAMUSCULAR; INTRAVENOUS ONCE
Status: COMPLETED | OUTPATIENT
Start: 2023-10-16 | End: 2023-10-16

## 2023-10-16 RX ORDER — ONDANSETRON 2 MG/ML
4 INJECTION INTRAMUSCULAR; INTRAVENOUS ONCE
Status: COMPLETED | OUTPATIENT
Start: 2023-10-16 | End: 2023-10-16

## 2023-10-16 RX ORDER — ONDANSETRON 4 MG/1
4 TABLET, FILM COATED ORAL EVERY 6 HOURS PRN
Qty: 8 TABLET | Refills: 0 | Status: SHIPPED | OUTPATIENT
Start: 2023-10-16

## 2023-10-16 RX ORDER — METOCLOPRAMIDE HYDROCHLORIDE 5 MG/ML
10 INJECTION INTRAMUSCULAR; INTRAVENOUS ONCE
Status: COMPLETED | OUTPATIENT
Start: 2023-10-16 | End: 2023-10-16

## 2023-10-16 RX ADMIN — METOCLOPRAMIDE 10 MG: 5 INJECTION, SOLUTION INTRAMUSCULAR; INTRAVENOUS at 08:53

## 2023-10-16 RX ADMIN — ONDANSETRON 4 MG: 2 INJECTION INTRAMUSCULAR; INTRAVENOUS at 10:21

## 2023-10-16 RX ADMIN — DIPHENHYDRAMINE HYDROCHLORIDE 25 MG: 50 INJECTION, SOLUTION INTRAMUSCULAR; INTRAVENOUS at 08:51

## 2023-10-16 NOTE — Clinical Note
Zamzam You was seen and treated in our emergency department on 10/16/2023. Diagnosis:     Zamzam Mckenzie  may return to school on return date. She may return on this date: 10/17/2023         If you have any questions or concerns, please don't hesitate to call.       Dale Valera MD    ______________________________           _______________          _______________  Hospital Representative                              Date                                Time

## 2023-10-16 NOTE — ED PROVIDER NOTES
History  Chief Complaint   Patient presents with    Migraine     Pt reports migraine since Saturday. Hx of migraines, c/o nausea as well. Patient is a 42-year-old female with a history of migraines presenting to emergency department for evaluation of occipital headache that began gradually about 2 days ago. Associated sxs include nausea and vomiting x1 today, photophobia, and phonophobia. Patient reports this headache feels like a typical migraine. Pain at its max was 10/10 but is currently 7/10. She has been taking Tylenol and ibuprofen yesterday with minimal improvement. Patient reports her PCP is with [facility]. She called her PCP and was told to come to the emergency department if headache got worse. Patient denies recent head injury, blood thinner medication, history of CVA, weakness, numbness, blurred vision, tinnitus, hearing loss, recent illness, neck stiffness, fever, CP, and dyspnea. Patient typically has about 2 migraines a month and was taking a medication she thinks is called "Bulbate" which helped but she ran out of medication about 1 month ago and has not contacted her PCP for more. History provided by:  Patient   used: No    Migraine  Severity:  Moderate  Onset quality:  Gradual  Duration:  2 days  Timing:  Constant  Associated symptoms: headaches, nausea and vomiting    Associated symptoms: no abdominal pain, no chest pain, no cough, no ear pain, no fever, no rash, no shortness of breath and no sore throat        Prior to Admission Medications   Prescriptions Last Dose Informant Patient Reported? Taking?    Mirena, 52 MG, 20 MCG/DAY IUD   Yes No   acetaminophen (TYLENOL) 650 mg CR tablet   No No   Sig: Take 1 tablet (650 mg total) by mouth every 8 (eight) hours as needed for mild pain   albuterol (PROVENTIL HFA,VENTOLIN HFA) 90 mcg/act inhaler   No No   Sig: TAKE 2 PUFFS BY MOUTH EVERY 6 HOURS AS NEEDED FOR WHEEZE   ePHEDrine HCl (Primatene) 12.5 MG TABS   Yes No   Sig: Take by mouth daily as needed   hydrocortisone (WESTCORT) 0.2 % cream   No No   Sig: Apply topically 2 (two) times a day   hydrocortisone 2.5 % cream   No No   Sig: Apply topically 2 (two) times a day   miSOPROStol (Cytotec) 200 mcg tablet   No No   Sig: Take one tablet orally the night prior to the procedure and take one tablet insert intravaginally the morning of the procedure at 5:30am   nicotine (NICODERM CQ) 21 mg/24 hr TD 24 hr patch   Yes No   Patient not taking: Reported on 8/4/2023   nicotine polacrilex (COMMIT) 4 MG lozenge   No No   Sig: Apply 1 lozenge (4 mg total) to the mouth or throat as needed for smoking cessation   norethindrone (Ortho Micronor) 0.35 MG tablet   No No   Sig: Take 1 tablet (0.35 mg total) by mouth daily   ondansetron (ZOFRAN) 4 mg tablet   No No   Sig: Take 1 tablet (4 mg total) by mouth every 8 (eight) hours as needed for nausea or vomiting   risperiDONE (RisperDAL) 4 mg tablet   No No   Sig: Take 1 tablet (4 mg total) by mouth daily   sulfamethoxazole-trimethoprim (BACTRIM DS) 800-160 mg per tablet   No No   Sig: Take 1 tablet by mouth every 12 (twelve) hours for 5 days Do not start before October 22, 2023.    tolterodine (DETROL LA) 4 mg 24 hr capsule   No No   Sig: Take 1 capsule (4 mg total) by mouth daily      Facility-Administered Medications: None       Past Medical History:   Diagnosis Date    Anxiety     Asthma     Bipolar 1 disorder (HCC)     Depression     IBS (irritable bowel syndrome)     Insomnia     Migraine     PTSD (post-traumatic stress disorder)        Past Surgical History:   Procedure Laterality Date    ANKLE HARDWARE REMOVAL Right     ANKLE SURGERY Right     CHOLECYSTECTOMY      FOOT SURGERY Left     with hardware    MOUTH SURGERY  2011    metal placed from broken jaw    ORIF MANDIBULAR FRACTURE Bilateral 06/03/2019    Procedure: [place] FROM LEFT MANDIBLE ALVEOLAR RIDGE;  Surgeon: Oresets Al DMD;  Location: [facility];  Service: Maxillofacial    OTHER SURGICAL HISTORY      unlisted craniofacial and maxillofacial procedure    KS COLPOSCOPY CERVIX VAG LOOP ELTRD BX CERVIX N/A 3/17/2023    Procedure: BIOPSY LEEP CERVIX;  Surgeon: Alex Simpson DO;  Location: [facility];  Service: Gynecology    US GUIDED BREAST BIOPSY RIGHT COMPLETE Right 3/29/2023       Family History   Problem Relation Age of Onset    Lung cancer Mother     Brain cancer Mother     Bone cancer Mother     Lung cancer Father     Bone cancer Father     Lung cancer Paternal Grandfather     Liver cancer Maternal Uncle     Lung cancer Paternal Uncle     No Known Problems Paternal Aunt     No Known Problems Paternal Aunt      I have reviewed and agree with the history as documented. E-Cigarette/Vaping    E-Cigarette Use Current Some Day User      E-Cigarette/Vaping Substances    Nicotine No     THC No     CBD No     Flavoring Yes     Other No     Unknown No      Social History     Tobacco Use    Smoking status: Every Day     Packs/day: 0.50     Years: 24.00     Total pack years: 12.00     Types: Cigarettes     Start date: 1999    Smokeless tobacco: Never    Tobacco comments:     2-3 cigarettes a day   Vaping Use    Vaping Use: Some days    Substances: Flavoring   Substance Use Topics    Alcohol use: Not Currently     Alcohol/week: 2.0 standard drinks of alcohol     Types: 2 Glasses of wine per week    Drug use: No     Comment: Denied history of drug use       Review of Systems   Constitutional:  Negative for chills and fever. HENT:  Negative for ear pain, hearing loss, sinus pain, sore throat and tinnitus. Eyes:  Positive for photophobia. Negative for pain and visual disturbance. Respiratory:  Negative for cough and shortness of breath. Cardiovascular:  Negative for chest pain and palpitations. Gastrointestinal:  Positive for nausea and vomiting. Negative for abdominal pain.    Genitourinary:  Negative for dysuria, hematuria and vaginal bleeding. Musculoskeletal:  Negative for arthralgias, back pain, gait problem, neck pain and neck stiffness. Skin:  Negative for color change and rash. Neurological:  Positive for headaches. Negative for dizziness, seizures, syncope, facial asymmetry, speech difficulty, weakness, light-headedness and numbness. Psychiatric/Behavioral:  Negative for confusion. All other systems reviewed and are negative. Physical Exam  Physical Exam  Vitals and nursing note reviewed. Constitutional:       General: She is not in acute distress. Appearance: She is well-developed. HENT:      Head: Normocephalic and atraumatic. Right Ear: Tympanic membrane and ear canal normal. No mastoid tenderness. No hemotympanum. Tympanic membrane is not injected, erythematous or bulging. Left Ear: Tympanic membrane and ear canal normal. No mastoid tenderness. No hemotympanum. Tympanic membrane is not injected, erythematous or bulging. Mouth/Throat:      Lips: Pink. Mouth: Mucous membranes are moist.      Pharynx: Oropharynx is clear. Uvula midline. No pharyngeal swelling, oropharyngeal exudate, posterior oropharyngeal erythema or uvula swelling. Eyes:      Extraocular Movements: Extraocular movements intact. Conjunctiva/sclera: Conjunctivae normal.      Pupils: Pupils are equal, round, and reactive to light. Cardiovascular:      Rate and Rhythm: Normal rate and regular rhythm. Heart sounds: No murmur heard. Pulmonary:      Effort: Pulmonary effort is normal. No respiratory distress. Breath sounds: Normal breath sounds. Abdominal:      Palpations: Abdomen is soft. Tenderness: There is no abdominal tenderness. Musculoskeletal:         General: No swelling. Cervical back: Neck supple. Skin:     General: Skin is warm and dry. Capillary Refill: Capillary refill takes less than 2 seconds. Neurological:      Mental Status: She is alert. GCS: GCS eye subscore is 4. GCS verbal subscore is 5. GCS motor subscore is 6. Gait: Gait is intact. Deep Tendon Reflexes:      Reflex Scores:       Tricep reflexes are 2+ on the right side and 2+ on the left side. Bicep reflexes are 2+ on the right side and 2+ on the left side. Brachioradialis reflexes are 2+ on the right side and 2+ on the left side. Patellar reflexes are 2+ on the right side and 2+ on the left side. Achilles reflexes are 2+ on the right side and 2+ on the left side.   Psychiatric:         Mood and Affect: Mood normal.         Vital Signs  ED Triage Vitals   Temperature Pulse Respirations Blood Pressure SpO2   10/16/23 0817 10/16/23 0817 10/16/23 0817 10/16/23 0817 10/16/23 0817   98 °F (36.7 °C) 94 18 121/53 95 %      Temp Source Heart Rate Source Patient Position - Orthostatic VS BP Location FiO2 (%)   10/16/23 0817 10/16/23 0817 10/16/23 1024 10/16/23 0817 --   Oral Monitor Lying Right arm       Pain Score       --                  Vitals:    10/16/23 0817 10/16/23 1024   BP: 121/53 105/74   Pulse: 94 64   Patient Position - Orthostatic VS:  Lying         Visual Acuity  Visual Acuity      Flowsheet Row Most Recent Value   L Pupil Size (mm) 4   R Pupil Size (mm) 4            ED Medications  Medications   diphenhydrAMINE (BENADRYL) injection 25 mg (25 mg Intravenous Given 10/16/23 0851)   metoclopramide (REGLAN) injection 10 mg (10 mg Intravenous Given 10/16/23 0853)   ondansetron (ZOFRAN) injection 4 mg (4 mg Intravenous Given 10/16/23 1021)       Diagnostic Studies  Results Reviewed       Procedure Component Value Units Date/Time    hCG, qualitative pregnancy [[id number]]  (Normal) Collected: 10/16/23 0851    Lab Status: Final result Specimen: Blood from Arm, Right Updated: 10/16/23 0925     Preg, Serum Negative    Basic metabolic panel [[id number]]  (Abnormal) Collected: 10/16/23 0851    Lab Status: Final result Specimen: Blood from Arm, Right Updated: 10/16/23 0915     Sodium 136 mmol/L Potassium 4.0 mmol/L      Chloride 109 mmol/L      CO2 22 mmol/L      ANION GAP 5 mmol/L      BUN 7 mg/dL      Creatinine 0.67 mg/dL      Glucose 121 mg/dL      Calcium 8.8 mg/dL      eGFR 111 ml/min/1.73sq m     Narrative:      National Kidney Disease Foundation guidelines for Chronic Kidney Disease (CKD):     Stage 1 with normal or high GFR (GFR > 90 mL/min/1.73 square meters)    Stage 2 Mild CKD (GFR = 60-89 mL/min/1.73 square meters)    Stage 3A Moderate CKD (GFR = 45-59 mL/min/1.73 square meters)    Stage 3B Moderate CKD (GFR = 30-44 mL/min/1.73 square meters)    Stage 4 Severe CKD (GFR = 15-29 mL/min/1.73 square meters)    Stage 5 End Stage CKD (GFR <15 mL/min/1.73 square meters)  Note: GFR calculation is accurate only with a steady state creatinine    CBC and differential [[id number]]  (Abnormal) Collected: 10/16/23 0851    Lab Status: Final result Specimen: Blood from Arm, Right Updated: 10/16/23 0901     WBC 12.25 Thousand/uL      RBC 4.39 Million/uL      Hemoglobin 14.4 g/dL      Hematocrit 42.7 %      MCV 97 fL      MCH 32.8 pg      MCHC 33.7 g/dL      RDW 13.0 %      MPV 10.4 fL      Platelets 974 Thousands/uL      nRBC 0 /100 WBCs      Neutrophils Relative 84 %      Immat GRANS % 0 %      Lymphocytes Relative 11 %      Monocytes Relative 5 %      Eosinophils Relative 0 %      Basophils Relative 0 %      Neutrophils Absolute 10.24 Thousands/µL      Immature Grans Absolute 0.04 Thousand/uL      Lymphocytes Absolute 1.36 Thousands/µL      Monocytes Absolute 0.55 Thousand/µL      Eosinophils Absolute 0.03 Thousand/µL      Basophils Absolute 0.03 Thousands/µL                    No orders to display              Procedures  Procedures         ED Course  ED Course as of 10/16/23 1147   Mon Oct 16, 2023   1018 Patient reports headache improved from 7/10 to a 2/10. She does report still having some nausea. 1136 Patient reports her nausea has completely resolved and her HA is just minimal now. SBIRT 20yo+      Flowsheet Row Most Recent Value   Initial Alcohol Screen: US AUDIT-C     1. How often do you have a drink containing alcohol? 0 Filed at: 10/16/2023 0821   2. How many drinks containing alcohol do you have on a typical day you are drinking? 0 Filed at: 10/16/2023 0821   3b. FEMALE Any Age, or MALE 65+: How often do you have 4 or more drinks on one occassion? 0 Filed at: 10/16/2023 1646   Audit-C Score 0 Filed at: 10/16/2023 1605   ROXI: How many times in the past year have you. .. Used an illegal drug or used a prescription medication for non-medical reasons? Never Filed at: 10/16/2023 2888                      Medical Decision Making  Pt in NAD prior to discharge. Patient's migraine and nausea improved prior to discharge. Pt reports this headache feels like her classic migraine symptoms. Recommended to f/u with her PCP closely but return to the ED immediately for worsening symptoms. Amount and/or Complexity of Data Reviewed  Labs: ordered. Risk  Prescription drug management. Disposition  Final diagnoses:   Other migraine without status migrainosus, not intractable   Nausea and vomiting, unspecified vomiting type     Time reflects when diagnosis was documented in both MDM as applicable and the Disposition within this note       Time User Action Codes Description Comment    10/16/2023 11:36 AM Spring Laura [G43.809] Other migraine without status migrainosus, not intractable     10/16/2023 11:38 AM Spring Laura [R11.2] Nausea and vomiting, unspecified vomiting type           ED Disposition       ED Disposition   Discharge    Condition   Stable    Date/Time   Mon Oct 16, 2023 1138    Comment   Vivian A Kenyatta discharge to home/self care.                    Follow-up Information       Follow up With Specialties Details Why Contact Info    Leo Cedeño, Sussy Morgan, Nurse Practitioner Schedule an appointment as soon as possible for a visit in 3 days  [address]              Patient's Medications   Discharge Prescriptions    ONDANSETRON (ZOFRAN) 4 MG TABLET    Take 1 tablet (4 mg total) by mouth every 6 (six) hours as needed for nausea or vomiting       Start Date: 10/16/2023End Date: --       Order Dose: 4 mg       Quantity: 8 tablet    Refills: 0       No discharge procedures on file.     PDMP Review       None            ED Provider  Electronically Signed by             Patricio Pineda PA-C  10/16/23 0588

## 2023-10-17 ENCOUNTER — APPOINTMENT (OUTPATIENT)
Dept: LAB | Facility: HOSPITAL | Age: 38
End: 2023-10-17
Payer: MEDICARE

## 2023-10-17 ENCOUNTER — VBI (OUTPATIENT)
Dept: FAMILY MEDICINE CLINIC | Facility: CLINIC | Age: 38
End: 2023-10-17

## 2023-10-17 DIAGNOSIS — N39.3 STRESS INCONTINENCE: ICD-10-CM

## 2023-10-17 PROCEDURE — 87147 CULTURE TYPE IMMUNOLOGIC: CPT

## 2023-10-17 PROCEDURE — 87086 URINE CULTURE/COLONY COUNT: CPT

## 2023-10-17 NOTE — TELEPHONE ENCOUNTER
10/17/23 11:34 AM    Patient contacted post ED visit, first outreach attempt made. Message was left for patient to return a call to the VBI Department at [facility]: Phone [phone]. Thank you.   Louise Barclay  [facility]

## 2023-10-18 RX ORDER — DIPHENOXYLATE HYDROCHLORIDE AND ATROPINE SULFATE 2.5; .025 MG/1; MG/1
1 TABLET ORAL DAILY
COMMUNITY

## 2023-10-18 NOTE — PRE-PROCEDURE INSTRUCTIONS
Pre-Surgery Instructions:   Medication Instructions    acetaminophen (TYLENOL) 650 mg CR tablet Uses PRN- OK to take day of surgery    albuterol (PROVENTIL HFA,VENTOLIN HFA) 90 mcg/act inhaler Uses PRN- OK to take day of surgery    ePHEDrine HCl (Primatene) 12.5 MG TABS Uses PRN- OK to take day of surgery    hydrocortisone (WESTCORT) 0.2 % cream Hold day of surgery. Mirena, 52 MG, 20 MCG/DAY IUD In place    multivitamin (THERAGRAN) TABS Stop taking 5 days prior to surgery. nicotine (NICODERM CQ) 21 mg/24 hr TD 24 hr patch Take night before surgery    ondansetron (ZOFRAN) 4 mg tablet Uses PRN- OK to take day of surgery    risperiDONE (RisperDAL) 4 mg tablet Take night before surgery    tolterodine (DETROL LA) 4 mg 24 hr capsule Hold day of surgery. Medication instructions for day surgery reviewed. Please use only a sip of water to take your instructed medications. Avoid all over the counter vitamins, supplements and NSAIDS for one week prior to surgery per anesthesia guidelines. Tylenol is ok to take as needed. You will receive a call one business day prior to surgery with an arrival time and hospital directions. If your surgery is scheduled on a Monday, the hospital will be calling you on the Friday prior to your surgery. If you have not heard from anyone by 8pm, please call the hospital supervisor through the hospital  at [phone]. [address] [phone]). Do not eat or drink anything after midnight the night before your surgery, including candy, mints, lifesavers, or chewing gum. Do not drink alcohol 24hrs before your surgery. Try not to smoke at least 24hrs before your surgery. Follow the pre surgery showering instructions as listed in the [facility] Surgical Experience Booklet” or otherwise provided by your surgeon's office. Do not shave the surgical area 24 hours before surgery.  Do not apply any lotions, creams, including makeup, cologne, deodorant, or perfumes after showering on the day of your surgery. No contact lenses, eye make-up, or artificial eyelashes. Remove nail polish, including gel polish, and any artificial, gel, or acrylic nails if possible. Remove all jewelry including rings and body piercing jewelry. Wear causal clothing that is easy to take on and off. Consider your type of surgery. Keep any valuables, jewelry, piercings at home. Please bring any specially ordered equipment (sling, braces) if indicated. Arrange for a responsible person to drive you to and from the hospital on the day of your surgery. Visitor Guidelines discussed. Call the surgeon's office with any new illnesses, exposures, or additional questions prior to surgery. Please reference your [facility] Surgical Experience Booklet” for additional information to prepare for your upcoming surgery.

## 2023-10-18 NOTE — TELEPHONE ENCOUNTER
10/18/23 10:06 AM    Patient contacted post ED visit, VBI department spoke with patient/caregiver and outreach was successful. Thank you.   Louise Barclay  [facility]

## 2023-10-19 LAB
BACTERIA UR CULT: ABNORMAL
BACTERIA UR CULT: ABNORMAL

## 2023-10-23 ENCOUNTER — ANESTHESIA EVENT (OUTPATIENT)
Dept: PERIOP | Facility: HOSPITAL | Age: 38
End: 2023-10-23
Payer: MEDICARE

## 2023-10-24 ENCOUNTER — HOSPITAL ENCOUNTER (OUTPATIENT)
Facility: HOSPITAL | Age: 38
Setting detail: OUTPATIENT SURGERY
Discharge: HOME | End: 2023-10-24
Attending: OBSTETRICS & GYNECOLOGY | Admitting: OBSTETRICS & GYNECOLOGY
Payer: MEDICARE

## 2023-10-24 ENCOUNTER — ANESTHESIA (OUTPATIENT)
Dept: PERIOP | Facility: HOSPITAL | Age: 38
End: 2023-10-24
Payer: MEDICARE

## 2023-10-24 VITALS
RESPIRATION RATE: 16 BRPM | WEIGHT: 183.42 LBS | OXYGEN SATURATION: 99 % | TEMPERATURE: 97.5 F | BODY MASS INDEX: 32.49 KG/M2 | DIASTOLIC BLOOD PRESSURE: 66 MMHG | HEART RATE: 53 BPM | SYSTOLIC BLOOD PRESSURE: 137 MMHG

## 2023-10-24 LAB
EXT PREGNANCY TEST URINE: NEGATIVE
EXT. CONTROL: NORMAL

## 2023-10-24 PROCEDURE — L8606 SYNTHETIC IMPLNT URINARY 1ML: HCPCS | Performed by: OBSTETRICS & GYNECOLOGY

## 2023-10-24 PROCEDURE — 81025 URINE PREGNANCY TEST: CPT | Performed by: ANESTHESIOLOGY

## 2023-10-24 PROCEDURE — 51715 ENDOSCOPIC INJECTION/IMPLANT: CPT | Performed by: OBSTETRICS & GYNECOLOGY

## 2023-10-24 DEVICE — BULKAMID URETHRAL BULKING SYSTEM 2ML
Type: IMPLANTABLE DEVICE | Status: FUNCTIONAL
Brand: BULKAMID

## 2023-10-24 RX ORDER — ACETAMINOPHEN 325 MG/1
975 TABLET ORAL EVERY 6 HOURS PRN
Status: DISCONTINUED | OUTPATIENT
Start: 2023-10-24 | End: 2023-10-24 | Stop reason: HOSPADM

## 2023-10-24 RX ORDER — HYDROMORPHONE HCL/PF 1 MG/ML
SYRINGE (ML) INJECTION AS NEEDED
Status: DISCONTINUED | OUTPATIENT
Start: 2023-10-24 | End: 2023-10-24

## 2023-10-24 RX ORDER — ONDANSETRON 2 MG/ML
4 INJECTION INTRAMUSCULAR; INTRAVENOUS ONCE AS NEEDED
Status: DISCONTINUED | OUTPATIENT
Start: 2023-10-24 | End: 2023-10-24 | Stop reason: HOSPADM

## 2023-10-24 RX ORDER — SODIUM CHLORIDE 9 MG/ML
125 INJECTION, SOLUTION INTRAVENOUS CONTINUOUS
Status: DISCONTINUED | OUTPATIENT
Start: 2023-10-24 | End: 2023-10-24 | Stop reason: HOSPADM

## 2023-10-24 RX ORDER — ONDANSETRON 2 MG/ML
INJECTION INTRAMUSCULAR; INTRAVENOUS AS NEEDED
Status: DISCONTINUED | OUTPATIENT
Start: 2023-10-24 | End: 2023-10-24

## 2023-10-24 RX ORDER — LIDOCAINE HYDROCHLORIDE 20 MG/ML
INJECTION, SOLUTION EPIDURAL; INFILTRATION; INTRACAUDAL; PERINEURAL AS NEEDED
Status: DISCONTINUED | OUTPATIENT
Start: 2023-10-24 | End: 2023-10-24

## 2023-10-24 RX ORDER — KETAMINE HCL IN NACL, ISO-OSM 100MG/10ML
SYRINGE (ML) INJECTION AS NEEDED
Status: DISCONTINUED | OUTPATIENT
Start: 2023-10-24 | End: 2023-10-24

## 2023-10-24 RX ORDER — KETOROLAC TROMETHAMINE 30 MG/ML
INJECTION, SOLUTION INTRAMUSCULAR; INTRAVENOUS AS NEEDED
Status: DISCONTINUED | OUTPATIENT
Start: 2023-10-24 | End: 2023-10-24

## 2023-10-24 RX ORDER — PROPOFOL 10 MG/ML
INJECTION, EMULSION INTRAVENOUS CONTINUOUS PRN
Status: DISCONTINUED | OUTPATIENT
Start: 2023-10-24 | End: 2023-10-24

## 2023-10-24 RX ORDER — ONDANSETRON 2 MG/ML
4 INJECTION INTRAMUSCULAR; INTRAVENOUS EVERY 6 HOURS PRN
Status: DISCONTINUED | OUTPATIENT
Start: 2023-10-24 | End: 2023-10-24 | Stop reason: HOSPADM

## 2023-10-24 RX ORDER — MIDAZOLAM HYDROCHLORIDE 2 MG/2ML
INJECTION, SOLUTION INTRAMUSCULAR; INTRAVENOUS AS NEEDED
Status: DISCONTINUED | OUTPATIENT
Start: 2023-10-24 | End: 2023-10-24

## 2023-10-24 RX ORDER — SODIUM CHLORIDE 9 MG/ML
INJECTION, SOLUTION INTRAVENOUS AS NEEDED
Status: DISCONTINUED | OUTPATIENT
Start: 2023-10-24 | End: 2023-10-24 | Stop reason: HOSPADM

## 2023-10-24 RX ORDER — PROPOFOL 10 MG/ML
INJECTION, EMULSION INTRAVENOUS AS NEEDED
Status: DISCONTINUED | OUTPATIENT
Start: 2023-10-24 | End: 2023-10-24

## 2023-10-24 RX ADMIN — PROPOFOL 140 MCG/KG/MIN: 10 INJECTION, EMULSION INTRAVENOUS at 15:37

## 2023-10-24 RX ADMIN — PROPOFOL 50 MG: 10 INJECTION, EMULSION INTRAVENOUS at 15:39

## 2023-10-24 RX ADMIN — SODIUM CHLORIDE: 0.9 INJECTION, SOLUTION INTRAVENOUS at 15:32

## 2023-10-24 RX ADMIN — MIDAZOLAM 2 MG: 1 INJECTION INTRAMUSCULAR; INTRAVENOUS at 15:28

## 2023-10-24 RX ADMIN — PROPOFOL 50 MG: 10 INJECTION, EMULSION INTRAVENOUS at 15:52

## 2023-10-24 RX ADMIN — ONDANSETRON 4 MG: 2 INJECTION INTRAMUSCULAR; INTRAVENOUS at 16:11

## 2023-10-24 RX ADMIN — HYDROMORPHONE HYDROCHLORIDE 0.5 MG: 1 INJECTION, SOLUTION INTRAMUSCULAR; INTRAVENOUS; SUBCUTANEOUS at 15:28

## 2023-10-24 RX ADMIN — KETOROLAC TROMETHAMINE 15 MG: 30 INJECTION, SOLUTION INTRAMUSCULAR; INTRAVENOUS at 16:12

## 2023-10-24 RX ADMIN — LIDOCAINE HYDROCHLORIDE 100 MG: 20 INJECTION, SOLUTION EPIDURAL; INFILTRATION; INTRACAUDAL at 15:37

## 2023-10-24 RX ADMIN — Medication 30 MG: at 15:55

## 2023-10-24 RX ADMIN — SODIUM CHLORIDE 8 MCG: 9 INJECTION, SOLUTION INTRAVENOUS at 15:50

## 2023-10-24 NOTE — ANESTHESIA PREPROCEDURE EVALUATION
Procedure:  CYSTO; INJECTION BULKING AGENT URETHRAL (Bladder)    Relevant Problems   MUSCULOSKELETAL   (+) Acute right-sided low back pain with right-sided sciatica   (+) Chronic right-sided low back pain with sciatica   (+) Post-traumatic arthritis of right ankle   (+) Primary osteoarthritis of left foot      NEURO/PSYCH   (+) Anxiety   (+) Chronic right-sided low back pain with sciatica   (+) Depression   (+) PTSD (post-traumatic stress disorder)      PULMONARY   (+) Asthma   (+) Mild intermittent asthma without complication   (+) Smoker        Physical Exam    Airway    Mallampati score: II  TM Distance: >3 FB  Neck ROM: full     Dental    upper dentures and lower dentures    Cardiovascular  Rhythm: regular, Rate: normal, Cardiovascular exam normal    Pulmonary  Pulmonary exam normal Breath sounds clear to auscultation    Other Findings        Anesthesia Plan  ASA Score- 2     Anesthesia Type- general with ASA Monitors. Additional Monitors:     Airway Plan: LMA. Plan Factors-    Chart reviewed. Existing labs reviewed. Patient summary reviewed. Patient is a current smoker. Patient instructed to abstain from smoking on day of procedure. Patient did not smoke on day of surgery. There is medical exclusion for perioperative obstructive sleep apnea risk education. Induction- intravenous. Postoperative Plan-     Informed Consent- Anesthetic plan and risks discussed with patient and sibling Jaya Esqueda (brother)).

## 2023-10-24 NOTE — OP NOTE
OPERATIVE REPORT  PATIENT NAME: Yolanda You    :  1985  MRN: [id number]  Pt Location: [facility] ROOM 04    SURGERY DATE: 10/24/2023    Surgeon(s) and Role:     * Lynda Ortega MD - Primary     * India Bennett MD - Kimberly Friday, MD - Observing     * Quyen Su MD    Preop Diagnosis:  Overactive bladder [N32.81]  Female stress incontinence [N39.3]    Post-Op Diagnosis Codes:     * Overactive bladder [N32.81]     * Female stress incontinence [N39.3]    Procedure(s):  CYSTO; INJECTION BULKING AGENT URETHRAL    Specimen(s):  * No specimens in log *    Estimated Blood Loss:   Minimal    Anesthesia Type: General    Operative Indications:  Overactive bladder [N32.81]  Female stress incontinence [N39.3]    Operative Findings:  - OAB was visible during the procedure, as the patient was having active bladder contractions  - Redundant bladder tissue     Complications:   None    Procedure and Technique:  The patient was properly identified in the holding area by the operating room staff and attending physician. She was taken to the operating room where sedation was instituted without complication. She was placed in the dorsal lithotomy position with her legs in [address] with care taken to avoid excessive flexion or extension of her lower extremities. Time-out was taken. The patient was again properly identified by the operating room staff and operating physician. At this time, the patient was prepped and draped in normal sterile fashion. The 0 degree Bulkamid cyststoscope was then inserted gently and advanced to the bladder neck. The needle with the bulking agent was advanced and the 2 cm naif was noted and advanced to the bladder. It was pulled back 2 cm into the urethra and the injecting needle was advanced to about 1 cm deep into the urethral tissue. Bulking agent was injected circumferentially at 4 sites roughly 0.3-0.5 mL on each site and good coaptation was noted.  About 2 mL of Bulkamid was injected. The scope was then withdrawn gently. The scope was then used to drain her bladder of remaining fluid. No bladder, ureteral, viscus, or solid organ injury were noted at the end of the procedure. The sponge, needle and instrument count were correct x 2. The patient tolerated the procedure well. She was awakened from anesthesia and transferred to the recovery room in stable condition. A co surgeon was needed for complexity of the case.      Patient Disposition:  PACU         SIGNATURE: Nabeel Augustine MD  DATE: October 24, 2023  TIME: 4:22 PM

## 2023-10-24 NOTE — ANESTHESIA POSTPROCEDURE EVALUATION
Post-Op Assessment Note    CV Status:  Stable    Pain management: adequate     Mental Status:  Alert and awake   Hydration Status:  Euvolemic   PONV Controlled:  Controlled   Airway Patency:  Patent      Post Op Vitals Reviewed: Yes      Staff: Anesthesiologist         No notable events documented.     BP      Temp      Pulse     Resp      SpO2        /62   Pulse 56   Temp 97.5 °F (36.4 °C) (Temporal)   Resp 16   Wt 83.2 kg (183 lb 6.8 oz)   LMP 06/21/2023 (Approximate) Comment: has implant-has not had menses since  SpO2 98%   BMI 32.49 kg/m²

## 2023-10-24 NOTE — DISCHARGE INSTR - AVS FIRST PAGE
Urethral Bulking Procedure   WHAT YOU NEED TO KNOW:   A urethral bulking procedure is surgery to treat urinary incontinence in women. You may have small amounts of bleeding with urination or small black beads in your urine for up to a week after your surgery. Use sanitary pads. . You may have some pelvic discomfort or trouble urinating. DISCHARGE INSTRUCTIONS:   Call 911 for any of the following: You have sudden trouble breathing. Seek care immediately if:   Your bleeding gets worse. You have yellow or foul smelling discharge from your vagina. You cannot urinate, or you are urinating less than what is normal for you. You feel confused. Contact your healthcare provider if:   You have a fever. You do not feel like you are able to empty your bladder completely when you urinate. You feel the need to urinate very suddenly. You have burning or stinging when you urinate. You have blood in your urine. Your skin is itchy, swollen, or you have a rash. You have questions or concerns about your condition or care. Medicines:   Prescription pain medicine  may be given. Ask your how to take this medicine safely. Take your medicine as directed. Contact your healthcare provider if you think your medicine is not helping or if you have side effects. Tell him or her if you are allergic to any medicine. Keep a list of the medicines, vitamins, and herbs you take. Include the amounts, and when and why you take them. Bring the list or the pill bottles to follow-up visits. Carry your medicine list with you in case of an emergency. Self-catheterization:  You may need to put a catheter into your bladder after you urinate to empty any remaining urine. A catheter is a small rubber tube used to drain urine. Healthcare providers will teach you how to put the catheter in safely. This may be needed until you are completely emptying your bladder. Melo catheter:   You may have a Melo catheter for a short period of time. The Melo is a tube put into your bladder to drain urine into a bag. Keep the bag below your waist. This will prevent urine from flowing back into your bladder and causing an infection or other problems. Also, keep the tube free of kinks so the urine will drain properly. Do not pull on the catheter. This can cause pain and bleeding, and may cause the catheter to come out. Activity:  Do not lift heavy objects for 6 weeks after your procedure. Do not have intercourse for 4 to 6 weeks. Do not use a tampon for 4 weeks. Ask your healthcare provider when you can return to work or your usual activities. Do pelvic muscle exercises: These are also called Kegel exercises. These exercises help strengthen your pelvic muscles and help prevent urine leakage. Tighten the muscles of your pelvis and hold them tight for 5 seconds, then relax for 5 seconds. Gradually work up to tightening them for 10 seconds and relaxing for 10 seconds. Do this 3 times each day. Keep a record:  Keep a record of when you urinate and if you leak any urine. Write down what you were doing when you leaked urine, such as coughing or sneezing. Bring the log to your follow-up visits. Prevent constipation:  Drink liquids as directed. You may need to drink more water than usual to soften your bowel movements. Eat a variety of healthy foods, especially fruit and foods high in fiber. You may need to use an over-the-counter bowel movement softener. Follow up with your healthcare provider as directed: You may need a test to check how much urine remains in your bladder after you urinate. This will help show how the sling is working. Write down your questions so you remember to ask them during your visits. © 2017 [address] [place] [address] [place] Information is for End User's use only and may not be sold, redistributed or otherwise used for commercial purposes.  All illustrations and images included in CareNotes® are the copyrighted property of [employer] or [facility]. The above information is an  only. It is not intended as medical advice for individual conditions or treatments. Talk to your doctor, nurse or pharmacist before following any medical regimen to see if it is safe and effective for you.

## 2023-11-10 ENCOUNTER — TELEPHONE (OUTPATIENT)
Dept: OTHER | Facility: OTHER | Age: 38
End: 2023-11-10

## 2023-11-10 ENCOUNTER — TELEPHONE (OUTPATIENT)
Dept: OBGYN CLINIC | Facility: CLINIC | Age: 38
End: 2023-11-10

## 2023-11-10 NOTE — TELEPHONE ENCOUNTER
Pt called to cancel their apt for today. as she is sick. Possible Covid.   Please call pt back to reschedule

## 2024-01-31 DIAGNOSIS — J45.20 MILD INTERMITTENT ASTHMA WITHOUT COMPLICATION: ICD-10-CM

## 2024-01-31 RX ORDER — ALBUTEROL SULFATE 90 UG/1
1 AEROSOL, METERED RESPIRATORY (INHALATION) EVERY 4 HOURS PRN
Qty: 8 G | Refills: 0 | Status: SHIPPED | OUTPATIENT
Start: 2024-01-31

## 2024-02-19 ENCOUNTER — ANNUAL EXAM (OUTPATIENT)
Dept: OBGYN CLINIC | Facility: CLINIC | Age: 39
End: 2024-02-19

## 2024-02-19 VITALS
DIASTOLIC BLOOD PRESSURE: 64 MMHG | SYSTOLIC BLOOD PRESSURE: 100 MMHG | HEART RATE: 56 BPM | HEIGHT: 62 IN | BODY MASS INDEX: 34.96 KG/M2 | WEIGHT: 190 LBS

## 2024-02-19 DIAGNOSIS — Z11.3 SCREEN FOR STD (SEXUALLY TRANSMITTED DISEASE): ICD-10-CM

## 2024-02-19 DIAGNOSIS — D24.1 FIBROADENOMA OF RIGHT BREAST: ICD-10-CM

## 2024-02-19 DIAGNOSIS — R10.2 PELVIC PAIN: ICD-10-CM

## 2024-02-19 DIAGNOSIS — Z01.419 ENCOUNTER FOR ANNUAL ROUTINE GYNECOLOGICAL EXAMINATION: Primary | ICD-10-CM

## 2024-02-19 PROCEDURE — 87491 CHLMYD TRACH DNA AMP PROBE: CPT | Performed by: NURSE PRACTITIONER

## 2024-02-19 PROCEDURE — G0145 SCR C/V CYTO,THINLAYER,RESCR: HCPCS | Performed by: NURSE PRACTITIONER

## 2024-02-19 PROCEDURE — 99395 PREV VISIT EST AGE 18-39: CPT | Performed by: NURSE PRACTITIONER

## 2024-02-19 PROCEDURE — 87591 N.GONORRHOEAE DNA AMP PROB: CPT | Performed by: NURSE PRACTITIONER

## 2024-02-19 PROCEDURE — G0476 HPV COMBO ASSAY CA SCREEN: HCPCS | Performed by: NURSE PRACTITIONER

## 2024-02-19 NOTE — LETTER
2024    To Vivian You  : 1985      This letter is to advise you that your recent PAP SMEAR results were reviewed by me and are NORMAL.  We will see you in 1 year for your annual exam.    GISSELLE Devlin

## 2024-02-19 NOTE — PATIENT INSTRUCTIONS
Schedule pelvic U/S and mammogram  PAP results can take up to 2 weeks  Call with needs or concerns  Return in 1 year

## 2024-02-19 NOTE — LETTER
2024    To Vivian You  : 1985      This letter is to advise you that your recent CULTURES for gonorrhea and chlamydia were reviewed by me and are NORMAL.  Please contact the office for an appointment if you have any additional concerns.    GISSELLE Devlin

## 2024-02-19 NOTE — PROGRESS NOTES
Annual Exam    Assessment   1. Encounter for annual routine gynecological examination          well woman       Plan       All questions answered.  Breast self exam technique reviewed and patient encouraged to perform self-exam monthly.  Contraception: IUD.  Discussed healthy lifestyle modifications.  Follow up in 1 year.  Thin prep Pap smear.     There are no Patient Instructions on file for this visit.    García You is a 38 y.o.  female who presents for annual well woman exam. Periods are not occurring with mirena IUD, lasting 0 days. No intermenstrual bleeding, spotting, or discharge.  2023 HGSIL PAP,,   2023 SHREYA II/III on colpo, 3/17/2023 LEEP  3/29/2023 fibroadenoma of R breast  1 partner x 16 years, denies domestic violence  Pt states she has noted sharp LLQ pain and is concerned her IUD moved      Explained IUD string check was WNL, pelvic U/S would be ordered    Depression Screening Follow-up Plan: Patient's depression screening was negative with a PHQ-2 score of 3. Their PHQ-9 score was 7. Clinically patient does not have depression. No treatment is required.      Current contraception: IUD  History of abnormal Pap smear: yes - 2023 HGSIL, colpo SHREYA II/III, LEEP  Family history of uterine or ovarian cancer: no  Regular self breast exam: yes  History of abnormal mammogram: R breast fibroadenoma  Family history of breast cancer: no  History of abnormal lipids: unknown  Menstrual History:  OB History          4    Para   1    Term   1            AB   3    Living   1         SAB   2    IAB   1    Ectopic        Multiple        Live Births   1                Menarche age: 13  No LMP recorded. Patient has had an implant.       The following portions of the patient's history were reviewed and updated as appropriate: allergies, current medications, past family history, past medical history, past social history, past surgical history and problem list.    Review of  "Systems  Pertinent items are noted in HPI.      Objective      /64   Pulse 56   Ht 5' 2\" (1.575 m)   Wt 86.2 kg (190 lb)   BMI 34.75 kg/m²     General: alert and oriented, in no acute distress, alert, appears stated age, and cooperative   Heart: NSR   Lungs: clear to auscultation bilaterally, WNL respiratory effort, negative cough or breath   Thyroid: Negative masses palpable   Abdomen: soft, non-tender, without masses or organomegaly palpable   Vulva: normal   Vagina: normal mucosa   Cervix: no bleeding following Pap, no cervical motion tenderness, no lesions, and Mirena IUD string noted at cervical os   Uterus: normal size, non-tender, normal shape and consistency   Adnexa: normal adnexa   Urethra: normal   Breasts: NT,negative masses palpable, negative discharge, or dimpling             "

## 2024-02-20 LAB
HPV HR 12 DNA CVX QL NAA+PROBE: NEGATIVE
HPV16 DNA CVX QL NAA+PROBE: NEGATIVE
HPV18 DNA CVX QL NAA+PROBE: NEGATIVE

## 2024-02-21 LAB
C TRACH DNA SPEC QL NAA+PROBE: NEGATIVE
N GONORRHOEA DNA SPEC QL NAA+PROBE: NEGATIVE

## 2024-02-22 LAB
LAB AP GYN PRIMARY INTERPRETATION: NORMAL
Lab: NORMAL

## 2024-03-04 ENCOUNTER — HOSPITAL ENCOUNTER (OUTPATIENT)
Dept: ULTRASOUND IMAGING | Facility: HOSPITAL | Age: 39
Discharge: HOME | End: 2024-03-04
Payer: MEDICARE

## 2024-03-04 DIAGNOSIS — R10.2 PELVIC PAIN: ICD-10-CM

## 2024-03-04 PROCEDURE — 76856 US EXAM PELVIC COMPLETE: CPT

## 2024-03-04 PROCEDURE — 76830 TRANSVAGINAL US NON-OB: CPT

## 2024-04-30 DIAGNOSIS — J45.20 MILD INTERMITTENT ASTHMA WITHOUT COMPLICATION: ICD-10-CM

## 2024-05-01 RX ORDER — ALBUTEROL SULFATE 90 UG/1
AEROSOL, METERED RESPIRATORY (INHALATION)
Qty: 16 G | Refills: 2 | Status: SHIPPED | OUTPATIENT
Start: 2024-05-01

## 2024-07-07 ENCOUNTER — HOSPITAL ENCOUNTER (EMERGENCY)
Facility: HOSPITAL | Age: 39
Discharge: HOME | End: 2024-07-08
Attending: EMERGENCY MEDICINE
Payer: MEDICARE

## 2024-07-07 VITALS
BODY MASS INDEX: 34.4 KG/M2 | OXYGEN SATURATION: 98 % | HEART RATE: 76 BPM | TEMPERATURE: 98 F | DIASTOLIC BLOOD PRESSURE: 77 MMHG | SYSTOLIC BLOOD PRESSURE: 121 MMHG | WEIGHT: 188.05 LBS | RESPIRATION RATE: 20 BRPM

## 2024-07-07 DIAGNOSIS — K52.9 GASTROENTERITIS: Primary | ICD-10-CM

## 2024-07-07 DIAGNOSIS — R19.7 DIARRHEA: ICD-10-CM

## 2024-07-07 DIAGNOSIS — R11.2 NAUSEA AND VOMITING: ICD-10-CM

## 2024-07-07 LAB
ALBUMIN SERPL BCG-MCNC: 4.3 G/DL (ref 3.5–5)
ALP SERPL-CCNC: 70 U/L (ref 34–104)
ALT SERPL W P-5'-P-CCNC: 17 U/L (ref 7–52)
ANION GAP SERPL CALCULATED.3IONS-SCNC: 10 MMOL/L (ref 4–13)
AST SERPL W P-5'-P-CCNC: 45 U/L (ref 13–39)
BASOPHILS # BLD AUTO: 0.03 THOUSANDS/ÂΜL (ref 0–0.1)
BASOPHILS NFR BLD AUTO: 0 % (ref 0–1)
BILIRUB SERPL-MCNC: 0.97 MG/DL (ref 0.2–1)
BILIRUB UR QL STRIP: ABNORMAL
BUN SERPL-MCNC: 10 MG/DL (ref 5–25)
CALCIUM SERPL-MCNC: 9.2 MG/DL (ref 8.4–10.2)
CHLORIDE SERPL-SCNC: 107 MMOL/L (ref 96–108)
CLARITY UR: CLEAR
CO2 SERPL-SCNC: 17 MMOL/L (ref 21–32)
COLOR UR: YELLOW
CREAT SERPL-MCNC: 0.84 MG/DL (ref 0.6–1.3)
EOSINOPHIL # BLD AUTO: 0.01 THOUSAND/ÂΜL (ref 0–0.61)
EOSINOPHIL NFR BLD AUTO: 0 % (ref 0–6)
ERYTHROCYTE [DISTWIDTH] IN BLOOD BY AUTOMATED COUNT: 12.9 % (ref 11.6–15.1)
GFR SERPL CREATININE-BSD FRML MDRD: 88 ML/MIN/1.73SQ M
GLUCOSE SERPL-MCNC: 109 MG/DL (ref 65–140)
GLUCOSE UR STRIP-MCNC: NEGATIVE MG/DL
HCT VFR BLD AUTO: 44.9 % (ref 34.8–46.1)
HGB BLD-MCNC: 16 G/DL (ref 11.5–15.4)
HGB UR QL STRIP.AUTO: NEGATIVE
IMM GRANULOCYTES # BLD AUTO: 0.03 THOUSAND/UL (ref 0–0.2)
IMM GRANULOCYTES NFR BLD AUTO: 0 % (ref 0–2)
KETONES UR STRIP-MCNC: ABNORMAL MG/DL
LEUKOCYTE ESTERASE UR QL STRIP: NEGATIVE
LIPASE SERPL-CCNC: 20 U/L (ref 11–82)
LYMPHOCYTES # BLD AUTO: 1.03 THOUSANDS/ÂΜL (ref 0.6–4.47)
LYMPHOCYTES NFR BLD AUTO: 11 % (ref 14–44)
MCH RBC QN AUTO: 32.8 PG (ref 26.8–34.3)
MCHC RBC AUTO-ENTMCNC: 35.6 G/DL (ref 31.4–37.4)
MCV RBC AUTO: 92 FL (ref 82–98)
MONOCYTES # BLD AUTO: 0.66 THOUSAND/ÂΜL (ref 0.17–1.22)
MONOCYTES NFR BLD AUTO: 7 % (ref 4–12)
NEUTROPHILS # BLD AUTO: 7.57 THOUSANDS/ÂΜL (ref 1.85–7.62)
NEUTS SEG NFR BLD AUTO: 82 % (ref 43–75)
NITRITE UR QL STRIP: NEGATIVE
NRBC BLD AUTO-RTO: 0 /100 WBCS
PH UR STRIP.AUTO: 6 [PH] (ref 4.5–8)
PLATELET # BLD AUTO: 178 THOUSANDS/UL (ref 149–390)
PMV BLD AUTO: 10.9 FL (ref 8.9–12.7)
POTASSIUM SERPL-SCNC: 4.3 MMOL/L (ref 3.5–5.3)
PROT SERPL-MCNC: 7.3 G/DL (ref 6.4–8.4)
PROT UR STRIP-MCNC: ABNORMAL MG/DL
RBC # BLD AUTO: 4.88 MILLION/UL (ref 3.81–5.12)
SODIUM SERPL-SCNC: 134 MMOL/L (ref 135–147)
SP GR UR STRIP.AUTO: >=1.03 (ref 1–1.03)
UROBILINOGEN UR QL STRIP.AUTO: 1 E.U./DL
WBC # BLD AUTO: 9.33 THOUSAND/UL (ref 4.31–10.16)

## 2024-07-07 PROCEDURE — 96375 TX/PRO/DX INJ NEW DRUG ADDON: CPT

## 2024-07-07 PROCEDURE — 96365 THER/PROPH/DIAG IV INF INIT: CPT

## 2024-07-07 PROCEDURE — 81001 URINALYSIS AUTO W/SCOPE: CPT

## 2024-07-07 PROCEDURE — 83690 ASSAY OF LIPASE: CPT

## 2024-07-07 PROCEDURE — 80053 COMPREHEN METABOLIC PANEL: CPT

## 2024-07-07 PROCEDURE — 36415 COLL VENOUS BLD VENIPUNCTURE: CPT

## 2024-07-07 PROCEDURE — 99284 EMERGENCY DEPT VISIT MOD MDM: CPT | Performed by: EMERGENCY MEDICINE

## 2024-07-07 PROCEDURE — 85025 COMPLETE CBC W/AUTO DIFF WBC: CPT

## 2024-07-07 PROCEDURE — 99283 EMERGENCY DEPT VISIT LOW MDM: CPT

## 2024-07-07 RX ORDER — ONDANSETRON 4 MG/1
4 TABLET, ORALLY DISINTEGRATING ORAL EVERY 6 HOURS PRN
Qty: 12 TABLET | Refills: 0 | Status: SHIPPED | OUTPATIENT
Start: 2024-07-07

## 2024-07-07 RX ORDER — METOCLOPRAMIDE HYDROCHLORIDE 5 MG/ML
10 INJECTION INTRAMUSCULAR; INTRAVENOUS ONCE
Status: COMPLETED | OUTPATIENT
Start: 2024-07-07 | End: 2024-07-07

## 2024-07-07 RX ORDER — DROPERIDOL 2.5 MG/ML
0.62 INJECTION, SOLUTION INTRAMUSCULAR; INTRAVENOUS ONCE
Status: COMPLETED | OUTPATIENT
Start: 2024-07-07 | End: 2024-07-07

## 2024-07-07 RX ADMIN — SODIUM CHLORIDE, SODIUM LACTATE, POTASSIUM CHLORIDE, AND CALCIUM CHLORIDE 1000 ML: .6; .31; .03; .02 INJECTION, SOLUTION INTRAVENOUS at 21:39

## 2024-07-07 RX ADMIN — METOCLOPRAMIDE 10 MG: 5 INJECTION, SOLUTION INTRAMUSCULAR; INTRAVENOUS at 23:22

## 2024-07-07 RX ADMIN — DROPERIDOL 0.62 MG: 2.5 INJECTION, SOLUTION INTRAMUSCULAR; INTRAVENOUS at 21:35

## 2024-07-07 NOTE — Clinical Note
Vivian You was seen and treated in our emergency department on 7/7/2024.                Diagnosis: Viral gastroenteritis    Vivian  may return to work on return date.    She may return on this date: 07/10/2024         If you have any questions or concerns, please don't hesitate to call.      Smith Gonzalez, DO    ______________________________           _______________          _______________  Hospital Representative                              Date                                Time

## 2024-07-08 LAB
BACTERIA UR QL AUTO: ABNORMAL /HPF
MUCOUS THREADS UR QL AUTO: ABNORMAL
NON-SQ EPI CELLS URNS QL MICRO: ABNORMAL /HPF
RBC #/AREA URNS AUTO: ABNORMAL /HPF
WBC #/AREA URNS AUTO: ABNORMAL /HPF

## 2024-07-08 NOTE — ED PROVIDER NOTES
History  Chief Complaint   Patient presents with    Vomiting     Vomiting, diarrhea since yesterday. Chills. Abdominal discomfort today. Patient reports is only able to keep down small sips of fluids.      38-year-old female with a history of asthma, migraines, bipolar disorder, anxiety, and depression who presents for evaluation of nausea, vomiting, diarrhea, and epigastric discomfort since yesterday.  The patient reports numerous episodes of nonbloody, nonbilious vomiting since yesterday.  The patient additionally reports watery diarrhea.  The patient reports that she has been unable to keep down anything other than small sips of Gatorade since this morning.  The patient reports that since this morning she has had intermittent epigastric discomfort.  The patient reports subjective fever and chills but has not checked her temperature.  The patient denies headache, nasal congestion, cough, chest pain, shortness of breath, dysuria, hematuria, vaginal bleeding, and vaginal discharge.        Prior to Admission Medications   Prescriptions Last Dose Informant Patient Reported? Taking?   Mirena, 52 MG, 20 MCG/DAY IUD   Yes No   acetaminophen (TYLENOL) 650 mg CR tablet   No No   Sig: Take 1 tablet (650 mg total) by mouth every 8 (eight) hours as needed for mild pain   albuterol (PROVENTIL HFA,VENTOLIN HFA) 90 mcg/act inhaler   No No   Sig: INHALE 1 PUFF EVERY 4 HOURS AS NEEDED FOR WHEEZING.   ePHEDrine HCl (Primatene) 12.5 MG TABS   Yes No   Sig: Take by mouth daily as needed (wheezing)   hydrocortisone (WESTCORT) 0.2 % cream   No No   Sig: Apply topically 2 (two) times a day   Patient taking differently: Apply topically 2 (two) times a day as needed   hydrocortisone 2.5 % cream   No No   Sig: Apply topically 2 (two) times a day   miSOPROStol (Cytotec) 200 mcg tablet   No No   Sig: Take one tablet orally the night prior to the procedure and take one tablet insert intravaginally the morning of the procedure at 5:30am    multivitamin (THERAGRAN) TABS   Yes No   Sig: Take 1 tablet by mouth daily   nicotine (NICODERM CQ) 21 mg/24 hr TD 24 hr patch   Yes No   Si patch every 24 hours   nicotine polacrilex (COMMIT) 4 MG lozenge   No No   Sig: Apply 1 lozenge (4 mg total) to the mouth or throat as needed for smoking cessation   norethindrone (Ortho Micronor) 0.35 MG tablet   No No   Sig: Take 1 tablet (0.35 mg total) by mouth daily   ondansetron (ZOFRAN) 4 mg tablet   No No   Sig: Take 1 tablet (4 mg total) by mouth every 8 (eight) hours as needed for nausea or vomiting   ondansetron (ZOFRAN) 4 mg tablet   No No   Sig: Take 1 tablet (4 mg total) by mouth every 6 (six) hours as needed for nausea or vomiting   risperiDONE (RisperDAL) 4 mg tablet   No No   Sig: Take 1 tablet (4 mg total) by mouth daily   Patient taking differently: Take 4 mg by mouth daily at bedtime   tolterodine (DETROL LA) 4 mg 24 hr capsule   No No   Sig: Take 1 capsule (4 mg total) by mouth daily      Facility-Administered Medications: None       Past Medical History:   Diagnosis Date    Anxiety     Asthma     Bipolar 1 disorder (HCC)     Depression     IBS (irritable bowel syndrome)     diarrhea    Insomnia     Migraine     PTSD (post-traumatic stress disorder)     UTI (urinary tract infection)        Past Surgical History:   Procedure Laterality Date    ANKLE HARDWARE REMOVAL Right     ANKLE SURGERY Right     CHOLECYSTECTOMY      FOOT SURGERY Left     with hardware    MOUTH SURGERY      metal placed from broken jaw    ORIF MANDIBULAR FRACTURE Bilateral 2019    Procedure: MANDIBLE CARROLL REMOVAL AND HARWARE REMOVAL FROM LEFT MANDIBLE ALVEOLAR RIDGE;  Surgeon: Lucretia Sierra DMD;  Location: [facility];  Service: Maxillofacial    OTHER SURGICAL HISTORY      unlisted craniofacial and maxillofacial procedure    CA COLPOSCOPY CERVIX VAG LOOP ELTRD BX CERVIX N/A 3/17/2023    Procedure: BIOPSY LEEP CERVIX;  Surgeon: Yardlie Toussaint-Foster, DO;  Location: [facility];  Service: Gynecology    NE CYSTOURETHROSCOPY N/A 10/24/2023    Procedure: CYSTO; INJECTION BULKING AGENT URETHRAL;  Surgeon: Jenny Wagner MD;  Location: [facility];  Service: Gynecology    US GUIDED BREAST BIOPSY RIGHT COMPLETE Right 3/29/2023       Family History   Problem Relation Age of Onset    Lung cancer Mother     Brain cancer Mother     Bone cancer Mother     Lung cancer Father     Bone cancer Father     Lung cancer Paternal Grandfather     Liver cancer Maternal Uncle     Lung cancer Paternal Uncle     No Known Problems Paternal Aunt     No Known Problems Paternal Aunt      I have reviewed and agree with the history as documented.    E-Cigarette/Vaping    E-Cigarette Use Never User      E-Cigarette/Vaping Substances    Nicotine No     THC No     CBD No     Flavoring No     Other No     Unknown No      Social History     Tobacco Use    Smoking status: Every Day     Current packs/day: 0.25     Average packs/day: 0.3 packs/day for 25.5 years (6.4 ttl pk-yrs)     Types: Cigarettes     Start date: 1999     Passive exposure: Current    Smokeless tobacco: Never    Tobacco comments:     2-3 cigarettes a day last smoked 10.23.23   Vaping Use    Vaping status: Never Used   Substance Use Topics    Alcohol use: Not Currently    Drug use: Yes     Types: Marijuana     Comment: medicated marijuana        Review of Systems   Constitutional:  Negative for chills, diaphoresis and fever.   HENT:  Negative for congestion and sore throat.    Eyes:  Negative for pain and redness.   Respiratory:  Negative for cough and shortness of breath.    Cardiovascular:  Negative for chest pain, palpitations and leg swelling.   Gastrointestinal:  Positive for abdominal pain, diarrhea, nausea and vomiting. Negative for abdominal distention and blood in stool.   Genitourinary:  Positive for decreased urine volume. Negative for dysuria, flank pain, hematuria, vaginal bleeding and vaginal discharge.   Musculoskeletal:  Negative for  arthralgias and myalgias.   Skin:  Negative for color change, pallor and rash.   Neurological:  Negative for dizziness, syncope, weakness, light-headedness, numbness and headaches.   All other systems reviewed and are negative.      Physical Exam  ED Triage Vitals [07/07/24 2102]   Temperature Pulse Respirations Blood Pressure SpO2   98 °F (36.7 °C) 76 20 121/77 98 %      Temp Source Heart Rate Source Patient Position - Orthostatic VS BP Location FiO2 (%)   Oral Monitor Sitting Right arm --      Pain Score       7             Orthostatic Vital Signs  Vitals:    07/07/24 2102   BP: 121/77   Pulse: 76   Patient Position - Orthostatic VS: Sitting       Physical Exam  Vitals and nursing note reviewed.   Constitutional:       General: She is not in acute distress.     Appearance: Normal appearance. She is not ill-appearing, toxic-appearing or diaphoretic.   HENT:      Head: Normocephalic and atraumatic.      Nose: Nose normal. No congestion or rhinorrhea.      Mouth/Throat:      Mouth: Mucous membranes are moist.      Pharynx: Oropharynx is clear.   Eyes:      General: No scleral icterus.     Extraocular Movements: Extraocular movements intact.      Conjunctiva/sclera: Conjunctivae normal.      Pupils: Pupils are equal, round, and reactive to light.   Cardiovascular:      Rate and Rhythm: Normal rate and regular rhythm.      Pulses: Normal pulses.      Heart sounds: Normal heart sounds. No murmur heard.     No friction rub. No gallop.   Pulmonary:      Effort: Pulmonary effort is normal.      Breath sounds: Normal breath sounds. No wheezing, rhonchi or rales.   Abdominal:      General: Abdomen is flat. There is no distension.      Palpations: Abdomen is soft.      Tenderness: There is abdominal tenderness in the epigastric area. There is no right CVA tenderness, left CVA tenderness, guarding or rebound.   Musculoskeletal:         General: No swelling, tenderness, deformity or signs of injury. Normal range of motion.       Cervical back: Normal range of motion and neck supple. No rigidity or tenderness.      Right lower leg: No edema.      Left lower leg: No edema.   Lymphadenopathy:      Cervical: No cervical adenopathy.   Skin:     General: Skin is warm and dry.      Capillary Refill: Capillary refill takes less than 2 seconds.      Coloration: Skin is not jaundiced or pale.      Findings: No bruising, erythema, lesion or rash.   Neurological:      General: No focal deficit present.      Mental Status: She is alert and oriented to person, place, and time.         ED Medications  Medications   lactated ringers bolus 1,000 mL (0 mL Intravenous Stopped 7/7/24 2303)   droperidol (INAPSINE) injection 0.625 mg (0.625 mg Intravenous Given 7/7/24 2135)   metoclopramide (REGLAN) injection 10 mg (10 mg Intravenous Given 7/7/24 2322)       Diagnostic Studies  Results Reviewed       Procedure Component Value Units Date/Time    Urine Microscopic [[id number]]  (Abnormal) Collected: 07/07/24 2325    Lab Status: Final result Specimen: Urine, Clean Catch Updated: 07/08/24 0000     RBC, UA 2-4 /hpf      WBC, UA 2-4 /hpf      Epithelial Cells Moderate /hpf      Bacteria, UA None Seen /hpf      MUCUS THREADS Innumerable    Urine Macroscopic, POC [[id number]]  (Abnormal) Collected: 07/07/24 2325    Lab Status: Final result Specimen: Urine Updated: 07/07/24 2327     Color, UA Yellow     Clarity, UA Clear     pH, UA 6.0     Leukocytes, UA Negative     Nitrite, UA Negative     Protein, UA 30 (1+) mg/dl      Glucose, UA Negative mg/dl      Ketones, UA Trace mg/dl      Urobilinogen, UA 1.0 E.U./dl      Bilirubin, UA Small     Occult Blood, UA Negative     Specific Gravity, UA >=1.030    Narrative:      CLINITEK RESULT    Comprehensive metabolic panel [[id number]]  (Abnormal) Collected: 07/07/24 2208    Lab Status: Final result Specimen: Blood from Arm, Left Updated: 07/07/24 2228     Sodium 134 mmol/L      Potassium 4.3 mmol/L      Chloride 107 mmol/L      CO2  17 mmol/L      ANION GAP 10 mmol/L      BUN 10 mg/dL      Creatinine 0.84 mg/dL      Glucose 109 mg/dL      Calcium 9.2 mg/dL      AST 45 U/L      ALT 17 U/L      Alkaline Phosphatase 70 U/L      Total Protein 7.3 g/dL      Albumin 4.3 g/dL      Total Bilirubin 0.97 mg/dL      eGFR 88 ml/min/1.73sq m     Narrative:      National Kidney Disease Foundation guidelines for Chronic Kidney Disease (CKD):     Stage 1 with normal or high GFR (GFR > 90 mL/min/1.73 square meters)    Stage 2 Mild CKD (GFR = 60-89 mL/min/1.73 square meters)    Stage 3A Moderate CKD (GFR = 45-59 mL/min/1.73 square meters)    Stage 3B Moderate CKD (GFR = 30-44 mL/min/1.73 square meters)    Stage 4 Severe CKD (GFR = 15-29 mL/min/1.73 square meters)    Stage 5 End Stage CKD (GFR <15 mL/min/1.73 square meters)  Note: GFR calculation is accurate only with a steady state creatinine    Lipase [[id number]]  (Normal) Collected: 07/07/24 2208    Lab Status: Final result Specimen: Blood from Arm, Left Updated: 07/07/24 2228     Lipase 20 u/L     CBC and differential [[id number]]  (Abnormal) Collected: 07/07/24 2129    Lab Status: Final result Specimen: Blood from Arm, Left Updated: 07/07/24 2136     WBC 9.33 Thousand/uL      RBC 4.88 Million/uL      Hemoglobin 16.0 g/dL      Hematocrit 44.9 %      MCV 92 fL      MCH 32.8 pg      MCHC 35.6 g/dL      RDW 12.9 %      MPV 10.9 fL      Platelets 178 Thousands/uL      nRBC 0 /100 WBCs      Segmented % 82 %      Immature Grans % 0 %      Lymphocytes % 11 %      Monocytes % 7 %      Eosinophils Relative 0 %      Basophils Relative 0 %      Absolute Neutrophils 7.57 Thousands/µL      Absolute Immature Grans 0.03 Thousand/uL      Absolute Lymphocytes 1.03 Thousands/µL      Absolute Monocytes 0.66 Thousand/µL      Eosinophils Absolute 0.01 Thousand/µL      Basophils Absolute 0.03 Thousands/µL     POCT urinalysis dipstick [[id number]]     Lab Status: No result Specimen: Urine     POCT pregnancy, urine [[id number]]     Lab  Status: No result                    No orders to display         Procedures  Procedures      ED Course                             SBIRT 22yo+      Flowsheet Row Most Recent Value   Initial Alcohol Screen: US AUDIT-C     1. How often do you have a drink containing alcohol? 0 Filed at: 07/07/2024 2130   2. How many drinks containing alcohol do you have on a typical day you are drinking?  0 Filed at: 07/07/2024 2130   3a. Male UNDER 65: How often do you have five or more drinks on one occasion? 0 Filed at: 07/07/2024 2130   3b. FEMALE Any Age, or MALE 65+: How often do you have 4 or more drinks on one occassion? 0 Filed at: 07/07/2024 2130   Audit-C Score 0 Filed at: 07/07/2024 2130   ROXI: How many times in the past year have you...    Used an illegal drug or used a prescription medication for non-medical reasons? Never Filed at: 07/07/2024 2130                  Medical Decision Making  38-year-old female with a history of asthma, migraines, bipolar disorder, anxiety, and depression who presents for evaluation of nausea, vomiting, diarrhea, and epigastric discomfort since yesterday.  Vitals are within the normal limits.  The patient has mild epigastric tenderness without rebound or guarding.  The remainder the patient's exam is unremarkable.  The patient's symptoms are consistent with gastroenteritis.  Will check CBC, CMP, lipase, UA, and urine pregnancy as the patient has not been able to eat and drink today.  Will treat the patient with IV fluid bolus and droperidol and reassess.    The patient has 1+ ketones on UA.  The remainder the patient's lab work is reviewed without actionable derangements.  The patient reports improvement in her symptoms but is still experiencing some nausea.  The patient is given Reglan with further improvement in her symptoms.  The patient is able to tolerate p.o. intake.  A prescription for Zofran is sent to the patient's pharmacy.  Return precautions are given and the patient is  discharged.    Amount and/or Complexity of Data Reviewed  Labs: ordered.    Risk  Prescription drug management.          Disposition  Final diagnoses:   Gastroenteritis   Nausea and vomiting   Diarrhea     Time reflects when diagnosis was documented in both MDM as applicable and the Disposition within this note       Time User Action Codes Description Comment    7/7/2024 11:44 PM Smith Gonzalez Add [K52.9] Gastroenteritis     7/7/2024 11:44 PM Smith Gonzalez Add [R11.2] Nausea and vomiting     7/7/2024 11:44 PM Smith Gonzalez Add [R19.7] Diarrhea           ED Disposition       ED Disposition   Discharge    Condition   Stable    Date/Time   Sun Jul 7, 2024 0642    Comment   Vivian SHERLYN Day discharge to home/self care.                   Follow-up Information       Follow up With Specialties Details Why Contact Info Additional Information    Critical access hospital Emergency Department Emergency Medicine Go to  If symptoms worsen [address] [facility] [id number]  [phone] [facility] Emergency Department, [address] [place], [place], [address]    [facility], Nurse Practitioner   [address] [place] [address]  [phone]               Discharge Medication List as of 7/7/2024 11:51 PM        START taking these medications    Details   ondansetron (ZOFRAN-ODT) 4 mg disintegrating tablet Take 1 tablet (4 mg total) by mouth every 6 (six) hours as needed for nausea or vomiting, Starting Sun 7/7/2024, Normal           CONTINUE these medications which have NOT CHANGED    Details   acetaminophen (TYLENOL) 650 mg CR tablet Take 1 tablet (650 mg total) by mouth every 8 (eight) hours as needed for mild pain, Starting Tue 7/13/2021, Normal      albuterol (PROVENTIL HFA,VENTOLIN HFA) 90 mcg/act inhaler INHALE 1 PUFF EVERY 4 HOURS AS NEEDED FOR WHEEZING., Normal      ePHEDrine HCl (Primatene) 12.5 MG TABS Take by mouth daily as  needed (wheezing), Historical Med      hydrocortisone (WESTCORT) 0.2 % cream Apply topically 2 (two) times a day, Starting Fri 3/24/2023, Normal      hydrocortisone 2.5 % cream Apply topically 2 (two) times a day, Starting Tue 4/25/2023, Normal      Mirena, 52 MG, 20 MCG/DAY IUD Starting Fri 4/28/2023, Historical Med      miSOPROStol (Cytotec) 200 mcg tablet Take one tablet orally the night prior to the procedure and take one tablet insert intravaginally the morning of the procedure at 5:30am, Normal      multivitamin (THERAGRAN) TABS Take 1 tablet by mouth daily, Historical Med      nicotine (NICODERM CQ) 21 mg/24 hr TD 24 hr patch 1 patch every 24 hours, Starting Wed 4/5/2023, Historical Med      nicotine polacrilex (COMMIT) 4 MG lozenge Apply 1 lozenge (4 mg total) to the mouth or throat as needed for smoking cessation, Starting Wed 3/22/2023, Normal      norethindrone (Ortho Micronor) 0.35 MG tablet Take 1 tablet (0.35 mg total) by mouth daily, Starting Thu 4/20/2023, Normal      !! ondansetron (ZOFRAN) 4 mg tablet Take 1 tablet (4 mg total) by mouth every 8 (eight) hours as needed for nausea or vomiting, Starting Thu 4/13/2023, Normal      !! ondansetron (ZOFRAN) 4 mg tablet Take 1 tablet (4 mg total) by mouth every 6 (six) hours as needed for nausea or vomiting, Starting Mon 10/16/2023, Normal      risperiDONE (RisperDAL) 4 mg tablet Take 1 tablet (4 mg total) by mouth daily, Starting Fri 6/23/2023, Normal      tolterodine (DETROL LA) 4 mg 24 hr capsule Take 1 capsule (4 mg total) by mouth daily, Starting Tue 4/25/2023, Normal       !! - Potential duplicate medications found. Please discuss with provider.        No discharge procedures on file.    PDMP Review       None             ED Provider  Attending physically available and evaluated Vivian A Day. I managed the patient along with the ED Attending.    Electronically Signed by           Smith Gonzalez DO  07/08/24 0013

## 2024-07-09 ENCOUNTER — VBI (OUTPATIENT)
Dept: ADMINISTRATIVE | Facility: OTHER | Age: 39
End: 2024-07-09

## 2024-07-09 NOTE — TELEPHONE ENCOUNTER
07/09/24 2:05 PM    Patient contacted post ED visit, [facility] department spoke with patient/caregiver and outreach was successful.    Thank you.  Jackelyn Barrera  [facility]

## 2024-07-11 NOTE — ED ATTENDING ATTESTATION
7/7/2024  I, Ed Hay MD, saw and evaluated the patient. I have discussed the patient with the resident/non-physician practitioner and agree with the resident's/non-physician practitioner's findings, Plan of Care, and MDM as documented in the resident's/non-physician practitioner's note, except where noted. All available labs and Radiology studies were reviewed.  I was present for key portions of any procedure(s) performed by the resident/non-physician practitioner and I was immediately available to provide assistance.       At this point I agree with the current assessment done in the Emergency Department.  I have conducted an independent evaluation of this patient a history and physical is as follows:    37 YO female presents with nausea, vomiting, diarrhea, upper abdominal pain. States she has had multiple episodes of NBNB emesis. States epigastric discomfort that has been intermittent. She has been tolerating small sips of fluids. Pt denies CP/SOB/F/C/N/V/D/C, no dysuria, burning on urination or blood in urine.     Gen: Pt is in NAD, appears uncomfortable  HEENT: Head is atraumatic, EOM's intact, neck has FROM  Chest: CTAB, non-tender  Heart: RRR  Abdomen: Soft, Tender to palpation in the upper abdomen  Musculoskeletal: FROM in all extremities  Skin: No rash, no ecchymosis  Neuro: Awake, alert, oriented x4; Cranial nerves II-XII intact  Psych: Normal affect    MDM -  Will check CBC for leukocytosis, metabolic panel for electrolyte abnormalities and dehydration,  LFT's to assess GB dysfunction, lipase for pancreatitis, metabolic panel for electrolyte abnormalities and dehydration. Will give fluids, antiemetics.    ED Course         Critical Care Time  Procedures

## 2024-10-01 ENCOUNTER — AMB VIDEO VISIT (OUTPATIENT)
Dept: OTHER | Facility: HOSPITAL | Age: 39
End: 2024-10-01
Payer: MEDICARE

## 2024-10-01 DIAGNOSIS — J06.9 VIRAL URI: Primary | ICD-10-CM

## 2024-10-01 PROBLEM — M54.41 ACUTE RIGHT-SIDED LOW BACK PAIN WITH RIGHT-SIDED SCIATICA: Status: RESOLVED | Noted: 2021-06-30 | Resolved: 2024-10-01

## 2024-10-01 PROCEDURE — 99212 OFFICE O/P EST SF 10 MIN: CPT | Performed by: PHYSICIAN ASSISTANT

## 2024-10-01 RX ORDER — BUSPIRONE HYDROCHLORIDE 10 MG/1
10 TABLET ORAL 2 TIMES DAILY
COMMUNITY
Start: 2024-08-05

## 2024-10-01 RX ORDER — CLONAZEPAM 0.5 MG/1
0.5 TABLET ORAL DAILY
COMMUNITY
Start: 2024-08-06

## 2024-10-01 RX ORDER — TRAZODONE HYDROCHLORIDE 50 MG/1
50 TABLET, FILM COATED ORAL DAILY
COMMUNITY
Start: 2024-08-05

## 2024-10-01 RX ORDER — OXCARBAZEPINE 150 MG/1
150 TABLET, FILM COATED ORAL DAILY
COMMUNITY
Start: 2024-08-05

## 2024-10-01 RX ORDER — ZOLPIDEM TARTRATE 10 MG/1
10 TABLET ORAL
COMMUNITY
Start: 2024-08-05

## 2024-10-01 NOTE — LETTER
October 1, 2024     Patient: Vivian You   YOB: 1985   Date of Visit: 10/1/2024       To Whom it May Concern:    Vivian You was seen and evaluated virtually on 10/1/2024 and is being tested for COVID/Flu. Please note if COVID and Flu tests are negative, She may return to work when fever free for 24 hours without the use of a fever reducing agent.     If COVID or Flu test is positive, She may return on 10/3/24 if fever free for 24 hours and symptoms overall improving. Upon return, She must then adhere to strict masking for an additional 5 days.    If you have any questions or concerns, please don't hesitate to call.         Sincerely,          Shannon D Severino, PA-C        CC: No Recipients

## 2024-10-01 NOTE — CARE ANYWHERE EVISITS
Visit Summary for ALICIA MATHIS - Gender: Female - Date of Birth: 1985  Date: [id number] - Duration: 16 minutes  Patient: ALICIA MATHIS  Provider: Shannon Severino PA-C    Patient Contact Information  Address  [address] [facility] [address]  [place]; [place] [address]  [phone]    Visit Topics  Fever [Added By: Self - 2024-10-01]  Headache [Added By: Self - 2024-10-01]  Cold [Added By: Self - 2024-10-01]    Triage Questions   What is your current physical address in the event of a medical emergency? Answer []  Are you allergic to any medications? Answer []  Are you now or could you be pregnant? Answer []  Do you have any immune system compromise or chronic lung   disease? Answer []  Do you have any vulnerable family members in the home (infant, pregnant, cancer, elderly)? Answer []     Conversation Transcripts  [0A][0A] [Notification] You are connected with Shannon Severino PA-C, Urgent Care Specialist.[0A][Notification] ALICIA MATHIS is located in [place].[0A][Notification] ALICIA MATHIS has shared health history...[0A]    Diagnosis  Acute upper respiratory infection, unspecified    Procedures  Value: [id number] Code: CPT-4 UNLISTED E&M SERVICE    Medications Prescribed    No prescriptions ordered    Electronically signed by: Severino PA-C, Shannon(NPI [id number])

## 2024-10-01 NOTE — PROGRESS NOTES
Virtual Regular Visit  Name: Vivian You      : 1985      MRN: [id number]  Encounter Provider: Shannon D Severino, PA-C  Encounter Date: 10/1/2024   Encounter department: VIRTUAL CARE     Verification of patient location:    Patient is located at Home in the following state in which I hold an active license [place]    Assessment & Plan  Viral URI    Orders:    Covid/Flu- Lab Collect    Throat culture; Future         Encounter provider Shannon D Severino, PA-C    The patient was identified by name and date of birth. Vivian You was informed that this is a telemedicine visit and that the visit is being conducted through the [employer] platform. She agrees to proceed..  My office door was closed. No one else was in the room.  She acknowledged consent and understanding of privacy and security of the video platform. The patient has agreed to participate and understands they can discontinue the visit at any time.    Patient is aware this is a billable service.     History of Present Illness     Woke up drenched with sweat. Fever 101.9, feels congested, throat hurts, generalized weakness, dull HA. Endorses slight cough and chest tightness, diarrhea yesterday. Tylenol 1g with some relief. COVID negative. [facility]-recently cleaned a COVID room. No CP, NV. Hasn't been smoking        History obtained from : patient  Review of Systems   Constitutional:  Positive for diaphoresis, fatigue and fever.   HENT:  Positive for congestion and sore throat. Negative for ear pain and nosebleeds.    Eyes:  Negative for redness.   Respiratory:  Positive for cough and chest tightness.    Cardiovascular:  Negative for chest pain.   Gastrointestinal:  Positive for diarrhea. Negative for blood in stool, nausea and vomiting.   Genitourinary:  Negative for hematuria.   Musculoskeletal:  Negative for gait problem.   Skin:  Negative for rash.   Neurological:  Positive for headaches. Negative for seizures.    Psychiatric/Behavioral:  Negative for behavioral problems.      Medical History Reviewed by provider this encounter:  Meds  Problems           Objective     There were no vitals taken for this visit.  Physical Exam  Constitutional:       General: She is not in acute distress.     Appearance: Normal appearance. She is ill-appearing (mild). She is not toxic-appearing.   HENT:      Head: Normocephalic and atraumatic.      Nose: No rhinorrhea.      Mouth/Throat:      Mouth: Mucous membranes are moist.      Pharynx: Uvula midline. No posterior oropharyngeal erythema or uvula swelling.      Tonsils: 0 on the right. 0 on the left.      Comments: Mostly edentulous. Somewhat of a white coating to tongue. Jaw dyskinesia   Eyes:      Conjunctiva/sclera: Conjunctivae normal.   Pulmonary:      Effort: Pulmonary effort is normal. No respiratory distress.      Breath sounds: No wheezing (no gross audible wheeze through computer).   Musculoskeletal:      Cervical back: Normal range of motion.   Skin:     Findings: No rash (on face or neck).   Neurological:      Mental Status: She is alert.      Cranial Nerves: No dysarthria or facial asymmetry.   Psychiatric:         Mood and Affect: Mood normal.         Behavior: Behavior normal.         Visit Time  Total Visit Duration: 17 min

## 2024-10-01 NOTE — PATIENT INSTRUCTIONS
"You can go to any outpatient [facility] to complete the testing that was ordered  Just provide your name and date of birth at registration and they will be able to pull up the orders.  You can search for a lab using [facility] \"Find Care\" and filter by \"Labs\" or click the link below:  [email or web address]    Call [phone], option 2 to request a mobile lab visit to your home    The results will go directly to your [facility] mary under \"Test Results\"  You should be able to screenshot the results, or you can print if opened from a web browser    [facility] Phone number is [phone] if you need assistance or have further questions  note in \"Letters\" section of [employer] mary. Can print if opened from a web browser    You likely have a virus such as the flu or a cold. Please schedule a follow-up with your PCP within the next 2 days for recheck. Go to the ER for new worsening or concerning symptoms including but not limited to shortness of breath or chest pain    You can have fever for 3-5 days with a viral illness and then any additional symptoms that develop (congestion, cough, nausea, diarrhea) can last for another 7 days.      Stay out of work/school until fever free for 24 hours without use of tylenol or motrin     Make sure you have a thermometer and if you feel chills or sweats check it and write it down.  Take Tylenol (acetaminophen) and Motrin (ibuprofen) for fevers, body aches, and headaches. Alternate Motrin and Tylenol every 3 hours for more consistent relief.     Drink plenty of fluids to stay well hydrated- at least 2 L per day for adults  Water, hot water with lemon or honey, warm tea.   Can drink Pedialyte, Gatorade/Powerade zero, but should mix with water.      For diarrhea, vomiting and abdominal pain   Milk or juice can make diarrhea worse.  follow \"BRAT\" diet Bananas, Rice, Applesauce, Toast (also plain pasta or crackers)  Small frequent meals   Allow diarrhea to run its course. " Most cases will resolved in 3-5 days     Use over-the-counter saline nasal spray/allergy medication for congestion, runny nose, and postnasal drip  Mucinex (guaifenesin) helps to thin and loosen mucous.   Claritin, Zyrtec, Xyzal, or Allegra are non-drowsy antihistamines- helps dry out mucous (will make mucous darker)  Delsym or robitussin (dextromethorphan) is a cough suppressant.  Oral decongestants- pseudoephedrine behind the counter pill helps with nasal and sinus congestion  Nasal Decongestants- phenylephrine or fluticasone nasal spray (oxymetazoline up to 3 days)  Vicks to the front/back of the chest bottom of the feet with socks     For sore throat relief  Warm salt water gargles, warm tea with honey as needed  Cool mist humidifier at bedside- helps keep airway moist  Chloraseptic spray or throat lozenges with benzocaine (cepacol max strength).

## 2024-10-02 ENCOUNTER — TELEPHONE (OUTPATIENT)
Dept: FAMILY MEDICINE CLINIC | Facility: CLINIC | Age: 39
End: 2024-10-02

## 2024-10-02 ENCOUNTER — LAB (OUTPATIENT)
Dept: LAB | Facility: HOSPITAL | Age: 39
End: 2024-10-02
Payer: MEDICARE

## 2024-10-02 DIAGNOSIS — J06.9 VIRAL URI: ICD-10-CM

## 2024-10-02 DIAGNOSIS — Z11.3 SCREEN FOR STD (SEXUALLY TRANSMITTED DISEASE): ICD-10-CM

## 2024-10-02 LAB
HBV SURFACE AG SER QL: NORMAL
TREPONEMA PALLIDUM IGG+IGM AB [PRESENCE] IN SERUM OR PLASMA BY IMMUNOASSAY: NORMAL

## 2024-10-02 PROCEDURE — 87521 HEPATITIS C PROBE&RVRS TRNSC: CPT

## 2024-10-02 PROCEDURE — 87070 CULTURE OTHR SPECIMN AEROBIC: CPT

## 2024-10-02 PROCEDURE — 87389 HIV-1 AG W/HIV-1&-2 AB AG IA: CPT

## 2024-10-02 PROCEDURE — 86780 TREPONEMA PALLIDUM: CPT

## 2024-10-02 PROCEDURE — 87636 SARSCOV2 & INF A&B AMP PRB: CPT | Performed by: PHYSICIAN ASSISTANT

## 2024-10-02 PROCEDURE — 87522 HEPATITIS C REVRS TRNSCRPJ: CPT

## 2024-10-02 PROCEDURE — 36415 COLL VENOUS BLD VENIPUNCTURE: CPT

## 2024-10-02 NOTE — TELEPHONE ENCOUNTER
first attempt to contact patient. Spoke with patient and she already had a video virtual with urgent care.

## 2024-10-03 LAB
FLUAV RNA RESP QL NAA+PROBE: NEGATIVE
FLUBV RNA RESP QL NAA+PROBE: NEGATIVE
HIV 1+2 AB+HIV1 P24 AG SERPL QL IA: NON REACTIVE
SARS-COV-2 RNA RESP QL NAA+PROBE: NEGATIVE

## 2024-10-04 LAB
BACTERIA THROAT CULT: NORMAL
HCV RNA SERPL NAA+PROBE-ACNC: NOT DETECTED K[IU]/ML

## 2024-10-16 DIAGNOSIS — J45.20 MILD INTERMITTENT ASTHMA WITHOUT COMPLICATION: ICD-10-CM

## 2024-10-16 RX ORDER — ALBUTEROL SULFATE 90 UG/1
1 INHALANT RESPIRATORY (INHALATION) AS NEEDED
Qty: 16 G | Refills: 0 | Status: SHIPPED | OUTPATIENT
Start: 2024-10-16

## 2024-11-07 ENCOUNTER — OFFICE VISIT (OUTPATIENT)
Dept: OBGYN CLINIC | Facility: CLINIC | Age: 39
End: 2024-11-07

## 2024-11-07 VITALS
WEIGHT: 202 LBS | DIASTOLIC BLOOD PRESSURE: 77 MMHG | SYSTOLIC BLOOD PRESSURE: 122 MMHG | HEART RATE: 65 BPM | BODY MASS INDEX: 36.95 KG/M2

## 2024-11-07 DIAGNOSIS — N92.0 MENORRHAGIA WITH REGULAR CYCLE: Primary | ICD-10-CM

## 2024-11-07 PROCEDURE — 99213 OFFICE O/P EST LOW 20 MIN: CPT | Performed by: OBSTETRICS & GYNECOLOGY

## 2024-11-07 NOTE — PROGRESS NOTES
"OB/GYN VISIT  Vivian PLATA Day  2024  5:37 PM    Subjective:     Vivian You is a 39 y.o.  female who presents for discussion of abnormal uterine bleeding. Vivian had an IUD placed 2023 for control of menstruation. Prior to having her IUD placed she reports that her cycles were \"horrible\". She notes that her OCPs which she was started on in 2023, provided her no relief. Following her IUD placement, she had irregular spotting from 2023-2023. After 2023, she had occasional spotting approximately every 3-4 months. Approximately 4 months ago, her bleeding began to get heavier. Initially, bleeding was lasting 2 days and has gradually began lasting longer, now 7 days. She reports passage of clots and increase in cramping.     Menstrual History:  OB History          4    Para   1    Term   1            AB   3    Living   1         SAB   2    IAB   1    Ectopic        Multiple        Live Births   1               Patient's last menstrual period was 10/19/2024 (exact date).       Past Medical History:   Diagnosis Date    Anxiety     Asthma     Bipolar 1 disorder (HCC)     Depression     IBS (irritable bowel syndrome)     diarrhea    Insomnia     Migraine     PTSD (post-traumatic stress disorder)     UTI (urinary tract infection)      Past Surgical History:   Procedure Laterality Date    ANKLE HARDWARE REMOVAL Right     ANKLE SURGERY Right     CHOLECYSTECTOMY      FOOT SURGERY Left     with hardware    MOUTH SURGERY      metal placed from broken jaw    ORIF MANDIBULAR FRACTURE Bilateral 2019    Procedure: MANDIBLE CARROLL REMOVAL AND HARWARE REMOVAL FROM LEFT MANDIBLE ALVEOLAR RIDGE;  Surgeon: Lucretia Sierra DMD;  Location: [facility];  Service: Maxillofacial    OTHER SURGICAL HISTORY      unlisted craniofacial and maxillofacial procedure    LA COLPOSCOPY CERVIX VAG LOOP ELTRD BX CERVIX N/A 3/17/2023    Procedure: BIOPSY LEEP CERVIX;  Surgeon: Yardlie Toussaint-Foster, DO;  " Location: [facility];  Service: Gynecology    IL CYSTOURETHROSCOPY N/A 10/24/2023    Procedure: CYSTO; INJECTION BULKING AGENT URETHRAL;  Surgeon: Jenny Wagner MD;  Location: [facility];  Service: Gynecology    US GUIDED BREAST BIOPSY RIGHT COMPLETE Right 3/29/2023       Objective:    Vitals: Blood pressure 122/77, pulse 65, weight 91.6 kg (202 lb), last menstrual period 10/19/2024, not currently breastfeeding.Body mass index is 36.95 kg/m².    Physical Exam  Constitutional:       General: She is not in acute distress.     Appearance: Normal appearance.   HENT:      Head: Normocephalic and atraumatic.   Cardiovascular:      Rate and Rhythm: Normal rate.   Pulmonary:      Effort: Pulmonary effort is normal.   Abdominal:      Palpations: Abdomen is soft.      Tenderness: There is no abdominal tenderness.   Genitourinary:     Labia:         Right: No rash, tenderness or lesion.         Left: No rash, tenderness or lesion.       Vagina: No vaginal discharge.      Cervix: No friability.      Comments: IUD strings visualized at the cervix   Skin:     General: Skin is warm and dry.   Neurological:      General: No focal deficit present.      Mental Status: She is alert.           Assessment/Plan:  Problem List Items Addressed This Visit       Menorrhagia with regular cycle - Primary     Endorses a long history of menorrhagia not controlled by OCPs  IUD placed 5/2023 with initial resolution of bleeding and now resurgence   TVUS 3/2024 secondary to pelvic pain showed IUD in place   IUD strings visualized at cervical os however plan for TVUS to evaluate for appropriate placement of IUD and structural abnormalities   Plan for CBC/TSH/Prolactin   Will call to discuss results          Relevant Orders    US pelvis complete non OB       D/w Dr. Zain Sharif MD

## 2024-11-08 PROBLEM — N92.0 MENORRHAGIA WITH REGULAR CYCLE: Status: ACTIVE | Noted: 2024-11-08

## 2024-11-08 NOTE — ASSESSMENT & PLAN NOTE
Endorses a long history of menorrhagia not controlled by OCPs  IUD placed 5/2023 with initial resolution of bleeding and now resurgence   TVUS 3/2024 secondary to pelvic pain showed IUD in place   IUD strings visualized at cervical os however plan for TVUS to evaluate for appropriate placement of IUD and structural abnormalities   Plan for CBC/TSH/Prolactin   Will call to discuss results

## 2024-11-12 ENCOUNTER — OFFICE VISIT (OUTPATIENT)
Dept: FAMILY MEDICINE CLINIC | Facility: CLINIC | Age: 39
End: 2024-11-12

## 2024-11-12 VITALS
RESPIRATION RATE: 16 BRPM | WEIGHT: 196 LBS | OXYGEN SATURATION: 98 % | DIASTOLIC BLOOD PRESSURE: 60 MMHG | HEART RATE: 60 BPM | HEIGHT: 62 IN | BODY MASS INDEX: 36.07 KG/M2 | SYSTOLIC BLOOD PRESSURE: 110 MMHG | TEMPERATURE: 97.6 F

## 2024-11-12 DIAGNOSIS — R51.9 FREQUENT HEADACHES: ICD-10-CM

## 2024-11-12 DIAGNOSIS — Z72.0 TOBACCO USE: ICD-10-CM

## 2024-11-12 DIAGNOSIS — Z23 NEED FOR COVID-19 VACCINE: ICD-10-CM

## 2024-11-12 DIAGNOSIS — J45.20 MILD INTERMITTENT ASTHMA WITHOUT COMPLICATION: ICD-10-CM

## 2024-11-12 DIAGNOSIS — E66.9 OBESITY (BMI 35.0-39.9 WITHOUT COMORBIDITY): ICD-10-CM

## 2024-11-12 DIAGNOSIS — L03.039 PARONYCHIA OF GREAT TOE: Primary | ICD-10-CM

## 2024-11-12 DIAGNOSIS — Z23 ENCOUNTER FOR IMMUNIZATION: ICD-10-CM

## 2024-11-12 PROCEDURE — 90471 IMMUNIZATION ADMIN: CPT | Performed by: NURSE PRACTITIONER

## 2024-11-12 PROCEDURE — 91320 SARSCV2 VAC 30MCG TRS-SUC IM: CPT | Performed by: NURSE PRACTITIONER

## 2024-11-12 PROCEDURE — 99214 OFFICE O/P EST MOD 30 MIN: CPT | Performed by: NURSE PRACTITIONER

## 2024-11-12 PROCEDURE — 90480 ADMN SARSCOV2 VAC 1/ONLY CMP: CPT | Performed by: NURSE PRACTITIONER

## 2024-11-12 PROCEDURE — 90673 RIV3 VACCINE NO PRESERV IM: CPT | Performed by: NURSE PRACTITIONER

## 2024-11-12 RX ORDER — ESCITALOPRAM OXALATE 5 MG/1
1 TABLET ORAL DAILY
COMMUNITY
Start: 2024-10-28

## 2024-11-12 RX ORDER — NICOTINE 21 MG/24HR
1 PATCH, TRANSDERMAL 24 HOURS TRANSDERMAL EVERY 24 HOURS
Qty: 28 PATCH | Refills: 0 | Status: SHIPPED | OUTPATIENT
Start: 2024-11-12

## 2024-11-12 RX ORDER — SUMATRIPTAN 50 MG/1
50 TABLET, FILM COATED ORAL ONCE AS NEEDED
Qty: 10 TABLET | Refills: 5 | Status: SHIPPED | OUTPATIENT
Start: 2024-11-12

## 2024-11-12 RX ORDER — SULFAMETHOXAZOLE AND TRIMETHOPRIM 800; 160 MG/1; MG/1
1 TABLET ORAL EVERY 12 HOURS SCHEDULED
Qty: 14 TABLET | Refills: 0 | Status: SHIPPED | OUTPATIENT
Start: 2024-11-12 | End: 2024-11-19

## 2024-11-12 RX ORDER — CALCIUM CARBONATE 300MG(750)
400 TABLET,CHEWABLE ORAL DAILY
Qty: 90 TABLET | Refills: 1 | Status: SHIPPED | OUTPATIENT
Start: 2024-11-12

## 2024-11-12 RX ORDER — ALBUTEROL SULFATE 90 UG/1
1 INHALANT RESPIRATORY (INHALATION) AS NEEDED
Qty: 16 G | Refills: 0 | Status: SHIPPED | OUTPATIENT
Start: 2024-11-12

## 2024-11-12 NOTE — PROGRESS NOTES
Ambulatory Visit  Name: Vivian You      : 1985      MRN: [id number]  Encounter Provider: GISSELLE Manzano  Encounter Date: 2024   Encounter department: [facility]    Assessment & Plan  Paronychia of great toe  Recurring, referral to podiatry   Warm salt water soaks tid   Orders:    Ambulatory Referral to Podiatry; Future    sulfamethoxazole-trimethoprim (BACTRIM DS) 800-160 mg per tablet; Take 1 tablet by mouth every 12 (twelve) hours for 7 days    Encounter for immunization    Orders:    influenza vaccine, recombinant, PF, 0.5 mL IM (Flublok)    Need for COVID-19 vaccine    Orders:    COVID-19 Pfizer mRNA vaccine 12 yr and older (Comirnaty pre-filled syringe)    Frequent headaches  Headache diary, start magnesium/ riboflavin   Imitrex PRN   Headache diary  Referral to neurology     Orders:    Magnesium 400 MG TABS; Take 1 tablet (400 mg total) by mouth in the morning    Ambulatory Referral to Neurology; Future    SUMAtriptan (IMITREX) 50 mg tablet; Take 1 tablet (50 mg total) by mouth once as needed for migraine for up to 1 dose may repeat in 2 hours if necessary, no more than 2 doses in 24 hours, no more than 3 times per week    Riboflavin 100 MG TABS; Take 4 tablets (400 mg total) by mouth in the morning    Obesity (BMI 35.0-39.9 without comorbidity)    Wt Readings from Last 3 Encounters:   24 88.9 kg (196 lb)   24 91.6 kg (202 lb)   24 85.3 kg (188 lb 0.8 oz)     -Encouraged diet and lifestyle changes: decrease processed foods (cakes, cookies, chips, soda), decrease total carbohydrate intake, decrease fried/fatty foods, increase fruits and vegetables, increase lean proteins (chicken, turkey), increase healthy fats (avocado, fish, nuts), drink plenty of water (at least four 16 oz bottles per day)    Orders:    TSH, 3rd generation with Free T4 reflex; Future    Lipid panel; Future    Hemoglobin A1C; Future    Comprehensive metabolic  panel; Future    CBC and differential; Future    Mild intermittent asthma without complication    Orders:    albuterol (PROVENTIL HFA,VENTOLIN HFA) 90 mcg/act inhaler; Inhale 1 puff if needed for wheezing INHALE 1 PUFF EVERY 4 HOURS AS NEEDED FOR WHEEZING.    Tobacco use    Orders:    nicotine (NICODERM CQ) 21 mg/24 hr TD 24 hr patch; Place 1 patch on the skin over 24 hours every 24 hours    BMI Counseling: Body mass index is 35.85 kg/m². The BMI is above normal. Nutrition recommendations include consuming healthier snacks and limiting drinks that contain sugar. Exercise recommendations include exercising 3-5 times per week. Rationale for BMI follow-up plan is due to patient being overweight or obese.       History of Present Illness     Patient is a 38 YO female who  has a past medical history of Anxiety, Asthma, Bipolar 1 disorder (HCC), Depression, IBS (irritable bowel syndrome), Insomnia, Migraine, PTSD (post-traumatic stress disorder), and UTI (urinary tract infection).    She presents for follow up. Reports increasing frequency of headaches.     Headache  Headache pattern:  Headache sometimes there, sometimes not at all  Initial event:  None  Recent changes:  Headaches come more often than they used to and headaches last longer than they used to  Frequency:  2 to 3 per week  Providers seen:  None  Quality:  Pounding and pulsating  Location:  Neck  Pain severity:  8  Aggravating factors:  Activity and light  Abortive medications tried:  Excedrin migraine/tension  Preventative medications tried:  None  Vitamins and supplements tried:  None  Alternative treatments tried:  None        Review of Systems   Constitutional:  Negative for chills and fever.   HENT:  Negative for ear pain and sore throat.    Eyes:  Positive for photophobia. Negative for pain and visual disturbance.   Respiratory:  Negative for cough and shortness of breath.    Cardiovascular:  Negative for chest pain and palpitations.   Gastrointestinal:  " Positive for nausea. Negative for abdominal pain and vomiting.   Genitourinary:  Negative for dysuria and hematuria.   Musculoskeletal:  Negative for arthralgias and back pain.   Skin:  Negative for color change and rash.   Neurological:  Positive for headaches. Negative for seizures and syncope.   All other systems reviewed and are negative.          Objective     /60 (BP Location: Right arm, Patient Position: Sitting, Cuff Size: Large)   Pulse 60   Temp 97.6 °F (36.4 °C) (Temporal)   Resp 16   Ht 5' 2\" (1.575 m)   Wt 88.9 kg (196 lb)   LMP 10/19/2024 (Exact Date) Comment: IUD  SpO2 98%   BMI 35.85 kg/m²     Physical Exam  Vitals and nursing note reviewed.   Constitutional:       General: She is not in acute distress.     Appearance: She is well-developed.   HENT:      Head: Normocephalic and atraumatic.      Right Ear: External ear normal.      Left Ear: External ear normal.   Eyes:      Extraocular Movements: Extraocular movements intact.      Conjunctiva/sclera: Conjunctivae normal.      Pupils: Pupils are equal, round, and reactive to light.   Cardiovascular:      Rate and Rhythm: Normal rate and regular rhythm.      Heart sounds: No murmur heard.  Pulmonary:      Effort: Pulmonary effort is normal. No respiratory distress.      Breath sounds: Normal breath sounds.   Musculoskeletal:         General: No swelling.      Cervical back: Neck supple.   Skin:     General: Skin is warm and dry.      Capillary Refill: Capillary refill takes less than 2 seconds.   Neurological:      General: No focal deficit present.      Mental Status: She is alert and oriented to person, place, and time.      Cranial Nerves: No cranial nerve deficit.      Sensory: No sensory deficit.   Psychiatric:         Mood and Affect: Mood normal.         Behavior: Behavior normal.         "

## 2024-11-13 NOTE — ASSESSMENT & PLAN NOTE
Orders:    albuterol (PROVENTIL HFA,VENTOLIN HFA) 90 mcg/act inhaler; Inhale 1 puff if needed for wheezing INHALE 1 PUFF EVERY 4 HOURS AS NEEDED FOR WHEEZING.

## 2024-11-16 ENCOUNTER — HOSPITAL ENCOUNTER (OUTPATIENT)
Dept: ULTRASOUND IMAGING | Facility: HOSPITAL | Age: 39
Discharge: HOME | End: 2024-11-16
Payer: MEDICARE

## 2024-11-16 DIAGNOSIS — N92.0 MENORRHAGIA WITH REGULAR CYCLE: ICD-10-CM

## 2024-11-16 PROCEDURE — 76830 TRANSVAGINAL US NON-OB: CPT

## 2024-11-16 PROCEDURE — 76856 US EXAM PELVIC COMPLETE: CPT

## 2024-11-18 DIAGNOSIS — N93.9 ABNORMAL UTERINE BLEEDING (AUB): Primary | ICD-10-CM

## 2024-12-03 ENCOUNTER — APPOINTMENT (OUTPATIENT)
Dept: LAB | Facility: HOSPITAL | Age: 39
End: 2024-12-03
Payer: MEDICARE

## 2024-12-03 DIAGNOSIS — N93.9 ABNORMAL UTERINE BLEEDING (AUB): ICD-10-CM

## 2024-12-03 DIAGNOSIS — F31.31 MILD DEPRESSED BIPOLAR I DISORDER (HCC): ICD-10-CM

## 2024-12-03 DIAGNOSIS — Z79.899 DRUG THERAPY: ICD-10-CM

## 2024-12-03 LAB — PROLACTIN SERPL-MCNC: 94.95 NG/ML (ref 3.34–26.72)

## 2024-12-03 PROCEDURE — 84146 ASSAY OF PROLACTIN: CPT

## 2024-12-04 ENCOUNTER — OCCMED (OUTPATIENT)
Dept: URGENT CARE | Facility: MEDICAL CENTER | Age: 39
End: 2024-12-04
Payer: OTHER MISCELLANEOUS

## 2024-12-04 ENCOUNTER — RESULTS FOLLOW-UP (OUTPATIENT)
Dept: FAMILY MEDICINE CLINIC | Facility: CLINIC | Age: 39
End: 2024-12-04

## 2024-12-04 ENCOUNTER — APPOINTMENT (OUTPATIENT)
Dept: RADIOLOGY | Facility: MEDICAL CENTER | Age: 39
End: 2024-12-04
Payer: OTHER MISCELLANEOUS
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
## 2024-12-04 PROCEDURE — 73564 X-RAY EXAM KNEE 4 OR MORE: CPT

## 2024-12-04 PROCEDURE — G0383 LEV 4 HOSP TYPE B ED VISIT: HCPCS

## 2024-12-04 PROCEDURE — 73610 X-RAY EXAM OF ANKLE: CPT

## 2024-12-04 PROCEDURE — 99284 EMERGENCY DEPT VISIT MOD MDM: CPT

## 2024-12-04 RX ORDER — PAROXETINE 10 MG/1
1 TABLET, FILM COATED ORAL DAILY
COMMUNITY
Start: 2024-11-25 | End: 2024-12-10

## 2024-12-04 RX ORDER — MAGNESIUM OXIDE 400 MG/1
TABLET ORAL
COMMUNITY
Start: 2024-11-12

## 2024-12-05 ENCOUNTER — RESULTS FOLLOW-UP (OUTPATIENT)
Dept: OBGYN CLINIC | Facility: CLINIC | Age: 39
End: 2024-12-05

## 2024-12-05 DIAGNOSIS — N92.0 MENORRHAGIA WITH REGULAR CYCLE: Primary | ICD-10-CM

## 2024-12-05 NOTE — TELEPHONE ENCOUNTER
Called and discussed with patient elevated prolactin level. We reviewed that this level was collected in the afternoon and the recommendation is for it to be repeated in the morning on a day following no sex/nipple stimulation and low stress. She expressed understanding and order was placed.     Julieta Sharif MD  Obstetrics & Gynecology PGY-4  12/5/2024  1:56 PM

## 2024-12-10 ENCOUNTER — OFFICE VISIT (OUTPATIENT)
Dept: FAMILY MEDICINE CLINIC | Facility: CLINIC | Age: 39
End: 2024-12-10

## 2024-12-10 VITALS
TEMPERATURE: 98 F | BODY MASS INDEX: 37.91 KG/M2 | OXYGEN SATURATION: 98 % | RESPIRATION RATE: 16 BRPM | HEIGHT: 62 IN | SYSTOLIC BLOOD PRESSURE: 100 MMHG | WEIGHT: 206 LBS | DIASTOLIC BLOOD PRESSURE: 70 MMHG | HEART RATE: 55 BPM

## 2024-12-10 DIAGNOSIS — J45.20 MILD INTERMITTENT ASTHMA WITHOUT COMPLICATION: ICD-10-CM

## 2024-12-10 DIAGNOSIS — F32.A DEPRESSION, UNSPECIFIED DEPRESSION TYPE: Primary | ICD-10-CM

## 2024-12-10 DIAGNOSIS — G43.909 MIGRAINE WITHOUT STATUS MIGRAINOSUS, NOT INTRACTABLE, UNSPECIFIED MIGRAINE TYPE: ICD-10-CM

## 2024-12-10 DIAGNOSIS — Z72.0 TOBACCO USE: ICD-10-CM

## 2024-12-10 DIAGNOSIS — Z23 ENCOUNTER FOR IMMUNIZATION: ICD-10-CM

## 2024-12-10 DIAGNOSIS — F41.9 ANXIETY: ICD-10-CM

## 2024-12-10 PROCEDURE — 99214 OFFICE O/P EST MOD 30 MIN: CPT | Performed by: NURSE PRACTITIONER

## 2024-12-10 PROCEDURE — 90471 IMMUNIZATION ADMIN: CPT | Performed by: NURSE PRACTITIONER

## 2024-12-10 PROCEDURE — 90677 PCV20 VACCINE IM: CPT | Performed by: NURSE PRACTITIONER

## 2024-12-10 RX ORDER — PAROXETINE 20 MG/1
20 TABLET, FILM COATED ORAL DAILY
Qty: 30 TABLET | Refills: 2 | Status: SHIPPED | OUTPATIENT
Start: 2024-12-10

## 2024-12-10 RX ORDER — NICOTINE 21 MG/24HR
1 PATCH, TRANSDERMAL 24 HOURS TRANSDERMAL EVERY 24 HOURS
Qty: 28 PATCH | Refills: 0 | Status: SHIPPED | OUTPATIENT
Start: 2024-12-10

## 2024-12-10 RX ORDER — ALBUTEROL SULFATE 90 UG/1
1 INHALANT RESPIRATORY (INHALATION) AS NEEDED
Qty: 16 G | Refills: 0 | Status: SHIPPED | OUTPATIENT
Start: 2024-12-10

## 2024-12-10 NOTE — ASSESSMENT & PLAN NOTE
Stable, continue albuterol PRN   Orders:    albuterol (PROVENTIL HFA,VENTOLIN HFA) 90 mcg/act inhaler; Inhale 1 puff if needed for wheezing INHALE 1 PUFF EVERY 4 HOURS AS NEEDED FOR WHEEZING.

## 2024-12-10 NOTE — PROGRESS NOTES
Name: Vivian You      : 1985      MRN: [id number]  Encounter Provider: GISSELLE Manzano  Encounter Date: 12/10/2024   Encounter department: [facility]  :  Assessment & Plan  Tobacco use  Continue working on smoking cessation   Orders:    nicotine (NICODERM CQ) 21 mg/24 hr TD 24 hr patch; Place 1 patch on the skin over 24 hours every 24 hours    Depression, unspecified depression type  Increased Paxil to help with vasomotor sx   Patient will let her psychiatrist know     Orders:    PARoxetine (PAXIL) 20 mg tablet; Take 1 tablet (20 mg total) by mouth daily    Anxiety    Orders:    PARoxetine (PAXIL) 20 mg tablet; Take 1 tablet (20 mg total) by mouth daily    Encounter for immunization    Orders:    Pneumococcal Conjugate Vaccine 20-valent (Pcv20)    Mild intermittent asthma without complication  Stable, continue albuterol PRN   Orders:    albuterol (PROVENTIL HFA,VENTOLIN HFA) 90 mcg/act inhaler; Inhale 1 puff if needed for wheezing INHALE 1 PUFF EVERY 4 HOURS AS NEEDED FOR WHEEZING.    Migraine without status migrainosus, not intractable, unspecified migraine type  Migraines have decrease in frequency and severity   Continue with magnesium and B2 supplementation   Continue Sumatriptan PRN   Neurology evaluation next month   FMLA forms completed for intermittent leave                 History of Present Illness     Patient is a 38 YO female who  has a past medical history of Anxiety, Asthma, Bipolar 1 disorder (HCC), Depression, IBS (irritable bowel syndrome), Insomnia, Migraine, PTSD (post-traumatic stress disorder), and UTI (urinary tract infection).    She presents for follow up. Reports increasing hot flashes at night. Following with GYN. Notes that her mother went through early menopause at age of 41.     The following portions of the patient's history were reviewed and updated as appropriate: allergies, current medications, past family history, past medical  "history, past social history, past surgical history and problem list.      Migraine  This is a chronic problem. The current episode started more than 1 year ago. The problem occurs intermittently. The problem has been gradually improving since onset. Pertinent negatives include no abdominal pain, back pain, coughing, dizziness, fever, hearing loss, nausea, numbness, photophobia, seizures, sore throat, vomiting or weakness.     Review of Systems   Constitutional:  Positive for diaphoresis. Negative for activity change, appetite change, chills, fatigue, fever and unexpected weight change.   HENT:  Negative for hearing loss, nosebleeds, sinus pain, sneezing, sore throat and trouble swallowing.    Eyes:  Negative for photophobia and visual disturbance.   Respiratory:  Negative for cough, chest tightness, shortness of breath and wheezing.    Cardiovascular:  Negative for chest pain, palpitations and leg swelling.   Gastrointestinal:  Negative for abdominal pain, constipation, nausea and vomiting.   Genitourinary:  Negative for decreased urine volume, difficulty urinating, dysuria, flank pain, genital sores, hematuria and urgency.   Musculoskeletal:  Negative for back pain and gait problem.   Skin:  Negative for pallor, rash and wound.   Neurological:  Positive for headaches. Negative for dizziness, seizures, syncope, weakness and numbness.   Hematological:  Negative for adenopathy. Does not bruise/bleed easily.   Psychiatric/Behavioral:  Positive for sleep disturbance. Negative for confusion, hallucinations, self-injury and suicidal ideas. The patient is not nervous/anxious.        Objective   /70 (BP Location: Right arm, Patient Position: Sitting, Cuff Size: Large)   Pulse 55   Temp 98 °F (36.7 °C) (Temporal)   Resp 16   Ht 5' 2\" (1.575 m)   Wt 93.4 kg (206 lb)   SpO2 98%   BMI 37.68 kg/m²      Physical Exam  Vitals and nursing note reviewed.   Constitutional:       General: She is not in acute distress.    "  Appearance: She is well-developed.   HENT:      Head: Normocephalic and atraumatic.      Right Ear: External ear normal.      Left Ear: External ear normal.   Eyes:      General:         Right eye: No discharge.         Left eye: No discharge.      Conjunctiva/sclera: Conjunctivae normal.   Cardiovascular:      Rate and Rhythm: Normal rate and regular rhythm.   Pulmonary:      Effort: Pulmonary effort is normal. No respiratory distress.      Breath sounds: Normal breath sounds.   Musculoskeletal:      Cervical back: Neck supple.   Skin:     General: Skin is warm and dry.      Capillary Refill: Capillary refill takes less than 2 seconds.   Neurological:      Mental Status: She is alert.   Psychiatric:         Mood and Affect: Mood normal.

## 2024-12-10 NOTE — ASSESSMENT & PLAN NOTE
Migraines have decrease in frequency and severity   Continue with magnesium and B2 supplementation   Continue Sumatriptan PRN   Neurology evaluation next month   FMLA forms completed for intermittent leave

## 2024-12-10 NOTE — ASSESSMENT & PLAN NOTE
Increased Paxil to help with vasomotor sx   Patient will let her psychiatrist know     Orders:    PARoxetine (PAXIL) 20 mg tablet; Take 1 tablet (20 mg total) by mouth daily

## 2024-12-11 ENCOUNTER — APPOINTMENT (OUTPATIENT)
Dept: URGENT CARE | Facility: MEDICAL CENTER | Age: 39
End: 2024-12-11
Payer: OTHER MISCELLANEOUS

## 2024-12-11 PROCEDURE — 99213 OFFICE O/P EST LOW 20 MIN: CPT

## 2024-12-12 ENCOUNTER — TELEPHONE (OUTPATIENT)
Dept: OBGYN CLINIC | Facility: CLINIC | Age: 39
End: 2024-12-12

## 2024-12-12 ENCOUNTER — TELEPHONE (OUTPATIENT)
Age: 39
End: 2024-12-12

## 2024-12-12 ENCOUNTER — APPOINTMENT (OUTPATIENT)
Dept: LAB | Facility: HOSPITAL | Age: 39
End: 2024-12-12
Payer: MEDICARE

## 2024-12-12 DIAGNOSIS — N92.0 MENORRHAGIA WITH REGULAR CYCLE: ICD-10-CM

## 2024-12-12 LAB — PROLACTIN SERPL-MCNC: 104.55 NG/ML (ref 3.34–26.72)

## 2024-12-12 PROCEDURE — 84146 ASSAY OF PROLACTIN: CPT

## 2024-12-12 PROCEDURE — 36415 COLL VENOUS BLD VENIPUNCTURE: CPT

## 2024-12-12 NOTE — TELEPHONE ENCOUNTER
"Patient called and left voicemail:    \"Yes hi my name is Vivian Day [birth year]. I'm calling because I spoke to Doctor Phong last week about my prolactin levels being high because and she sent me for a retest because she believes it's because I got it done late in the day. Well this morning I went at 7:00 AM and they're still high. I'm wondering if someone from the office or if she could possibly call me and explain to me kind of what's going on and what my next step should be. My phone number is [phone]. Thank you.\"    Patient concerned for high prolactin levels and would like to speak to a provider reg. This. Please advise.     "

## 2024-12-12 NOTE — TELEPHONE ENCOUNTER
Pt calling to follow up on Prolactin levels. RN advised pt called [facility], and will need to call [facility]. Provided Yissel office phone number. No further questions.

## 2024-12-13 ENCOUNTER — RESULTS FOLLOW-UP (OUTPATIENT)
Dept: OBGYN CLINIC | Facility: CLINIC | Age: 39
End: 2024-12-13

## 2024-12-13 DIAGNOSIS — R79.89 ELEVATED PROLACTIN LEVEL: Primary | ICD-10-CM

## 2024-12-13 NOTE — TELEPHONE ENCOUNTER
Called to discuss with patient elevated prolactin level x 2 with second level >100 in the setting of menorrhagia. Discussed with Vivian that at this time recommendation is to proceed with MRI of pituitary gland to rule out prolactinoma. She expressed understanding. All questions answered. Number for central scheduling provided.        Julieta Sharif MD  Obstetrics & Gynecology PGY-4  12/13/2024  8:21 AM

## 2024-12-17 ENCOUNTER — APPOINTMENT (OUTPATIENT)
Dept: URGENT CARE | Facility: MEDICAL CENTER | Age: 39
End: 2024-12-17
Payer: OTHER MISCELLANEOUS

## 2024-12-17 PROCEDURE — 99213 OFFICE O/P EST LOW 20 MIN: CPT

## 2025-01-02 ENCOUNTER — HOSPITAL ENCOUNTER (OUTPATIENT)
Dept: MRI IMAGING | Facility: HOSPITAL | Age: 40
Discharge: HOME | End: 2025-01-02
Payer: MEDICARE

## 2025-01-02 DIAGNOSIS — R79.89 ELEVATED PROLACTIN LEVEL: ICD-10-CM

## 2025-01-02 PROCEDURE — 70553 MRI BRAIN STEM W/O & W/DYE: CPT

## 2025-01-02 PROCEDURE — A9585 GADOBUTROL INJECTION: HCPCS

## 2025-01-02 RX ORDER — GADOBUTROL 604.72 MG/ML
9 INJECTION INTRAVENOUS
Status: COMPLETED | OUTPATIENT
Start: 2025-01-02 | End: 2025-01-02

## 2025-01-02 RX ADMIN — GADOBUTROL 9 ML: 604.72 INJECTION INTRAVENOUS at 19:28

## 2025-01-03 ENCOUNTER — APPOINTMENT (OUTPATIENT)
Dept: URGENT CARE | Facility: MEDICAL CENTER | Age: 40
End: 2025-01-03
Payer: OTHER MISCELLANEOUS

## 2025-01-03 DIAGNOSIS — Z72.0 TOBACCO USE: ICD-10-CM

## 2025-01-03 PROCEDURE — 99213 OFFICE O/P EST LOW 20 MIN: CPT

## 2025-01-07 RX ORDER — NICOTINE 21 MG/24HR
1 PATCH, TRANSDERMAL 24 HOURS TRANSDERMAL EVERY 24 HOURS
Qty: 28 PATCH | Refills: 0 | Status: SHIPPED | OUTPATIENT
Start: 2025-01-07

## 2025-01-09 ENCOUNTER — TELEPHONE (OUTPATIENT)
Dept: LABOR AND DELIVERY | Facility: HOSPITAL | Age: 40
End: 2025-01-09

## 2025-01-09 NOTE — TELEPHONE ENCOUNTER
Called and discussed results of head MRI with patient. Reviewed no concerns for prolactinoma at this time. Discussed findings possibly suggestive of idiopathic intracranial hypertension. In the setting of history of headaches, recommend further evaluation by primary care provider.    Vivian endorses continued daily spotting with IUD in place that she finds bothersome. Message sent to office for follow up visit to discuss management options.       Julieta Sharif MD  Obstetrics & Gynecology PGY-4  1/9/2025  5:23 PM

## 2025-01-13 ENCOUNTER — OFFICE VISIT (OUTPATIENT)
Dept: PODIATRY | Facility: CLINIC | Age: 40
End: 2025-01-13
Payer: MEDICARE

## 2025-01-13 VITALS — WEIGHT: 206 LBS | BODY MASS INDEX: 37.91 KG/M2 | HEIGHT: 62 IN

## 2025-01-13 DIAGNOSIS — L60.8 PINCER NAIL DEFORMITY: ICD-10-CM

## 2025-01-13 PROCEDURE — 99202 OFFICE O/P NEW SF 15 MIN: CPT | Performed by: PODIATRIST

## 2025-01-13 NOTE — PROGRESS NOTES
"Name: Vivian You      : 1985      MRN: [id number]  Encounter Provider: Andre Cabral DPM  Encounter Date: 2025   Encounter department: [facility] [place]  :  Assessment & Plan  Pincer nail deformity  Large sharp nail spicule left medial and lateral nail bed. Easily trimmed and removed out of courtesy. Pain improved almost immediately. She is trimming the nail but leaving a sharp edge in the corners, discussed proper nail trimming    Discussed effects of smoking causing nail deformity and pain. We discussed the relationship between cigarette smoking, atherosclerotic disease, and its effects on the lower extremity. I emphasized the importance of smoking cessation     Orders:  •  Ambulatory Referral to Podiatry        History of Present Illness   HPI  Vivian You is a 39 y.o. female who presents with ingrown toenails. The nails bend inward and dig into the tips of her toes. She smokes 1/2ppd.       Review of Systems  As stated in HPI, otherwise normal    Medical History Reviewed by provider this encounter:  Tobacco  Allergies  Meds  Problems  Med Hx  Surg Hx  Fam Hx           Objective   Ht 5' 2\" (1.575 m)   Wt 93.4 kg (206 lb)   BMI 37.68 kg/m²      Physical Exam  Vitals reviewed.   Constitutional:       Appearance: She is obese.   Cardiovascular:      Pulses: Normal pulses.   Skin:     Findings: No erythema.      Comments: Left hallux pincer nail deformity   Neurological:      Mental Status: She is alert.           "

## 2025-01-20 ENCOUNTER — HOSPITAL ENCOUNTER (OUTPATIENT)
Dept: RADIOLOGY | Facility: HOSPITAL | Age: 40
Discharge: HOME | End: 2025-01-20
Payer: MEDICARE

## 2025-01-20 ENCOUNTER — TELEMEDICINE (OUTPATIENT)
Dept: OTHER | Facility: HOSPITAL | Age: 40
End: 2025-01-20
Payer: MEDICARE

## 2025-01-20 VITALS — OXYGEN SATURATION: 92 % | HEART RATE: 73 BPM | TEMPERATURE: 102.3 F

## 2025-01-20 DIAGNOSIS — R50.9 FEVER, UNSPECIFIED FEVER CAUSE: ICD-10-CM

## 2025-01-20 DIAGNOSIS — R50.9 FEVER, UNSPECIFIED FEVER CAUSE: Primary | ICD-10-CM

## 2025-01-20 PROCEDURE — 71046 X-RAY EXAM CHEST 2 VIEWS: CPT

## 2025-01-20 PROCEDURE — 99213 OFFICE O/P EST LOW 20 MIN: CPT | Performed by: NURSE PRACTITIONER

## 2025-01-20 NOTE — PATIENT INSTRUCTIONS
Will do a chest xray.  Will recommend a covid/flu test.  You could also get this OTC.  Rest and drink extra fluids.  Tylenol or motrin as needed.  Follow up with PCP if no improvement.  Go to ER with any worsening symptoms.

## 2025-01-20 NOTE — PROGRESS NOTES
Virtual Regular Visit  Name: Vivian You      : 1985      MRN: [id number]  Encounter Provider: GISSELLE Tyler  Encounter Date: 2025   Encounter department: VIRTUAL CARE       Verification of patient location:  Patient is located at Home in the following state in which I hold an active license [place] :  Assessment & Plan  Fever, unspecified fever cause    Orders:    XR chest pa and lateral; Future    Covid/Flu- Lab Collect        Encounter provider GISSELLE Tyler    The patient was identified by name and date of birth. Vivian You was informed that this is a telemedicine visit and that the visit is being conducted through the Epic Embedded platform. She agrees to proceed..  My office door was closed. No one else was in the room.  She acknowledged consent and understanding of privacy and security of the video platform. The patient has agreed to participate and understands they can discontinue the visit at any time.    Patient is aware this is a billable service.     History was obtained from: History obtained from: patient  History of Present Illness     This is a 39 year old female here today for video visit.  She states she woke up with fever and cough.  She states her son just had pneumonia.  She stats yesterday she felt normal.  She has a sight discomfort with deep breath but no other pain or sob.  She is drinking fluids.  She did have her flu vaccine and covid vaccined.  She did test for covid which was negative this AM.  She states she has nasal congestion and sore throat.       Review of Systems   Constitutional:  Positive for activity change, chills and fatigue.   HENT:  Positive for congestion and rhinorrhea.    Respiratory:  Positive for cough. Negative for shortness of breath and wheezing.    Neurological: Negative.    Psychiatric/Behavioral: Negative.         Objective   Pulse 73   Temp (!) 102.3 °F (39.1 °C)   SpO2 92%     Physical Exam  Constitutional:       General: She is  not in acute distress.     Appearance: Normal appearance. She is not ill-appearing or toxic-appearing.   HENT:      Head: Normocephalic and atraumatic.      Nose: Congestion and rhinorrhea present.   Eyes:      Conjunctiva/sclera: Conjunctivae normal.   Pulmonary:      Effort: Pulmonary effort is normal. No respiratory distress.      Comments: Slight cough during video visit.   Neurological:      Mental Status: She is alert and oriented to person, place, and time.   Psychiatric:         Mood and Affect: Mood normal.         Behavior: Behavior normal.         Thought Content: Thought content normal.         Judgment: Judgment normal.         Visit Time  Total Visit Duration: 7 minutes not including the time spent for establishing the audio/video connection.

## 2025-01-20 NOTE — LETTER
January 20, 2025     Patient: Vivian You   YOB: 1985   Date of Visit: 1/20/2025       To Whom it May Concern:    Vivian You is under my professional care. She was seen virtually on 1/20/2025. She may return to work on when fever free for 24 hours and symptoms are improving .    If you have any questions or concerns, please don't hesitate to call.         Sincerely,          GISSELLE Tyler        CC: No Recipients

## 2025-01-21 ENCOUNTER — TELEPHONE (OUTPATIENT)
Age: 40
End: 2025-01-21

## 2025-01-21 ENCOUNTER — OFFICE VISIT (OUTPATIENT)
Dept: NEUROLOGY | Facility: CLINIC | Age: 40
End: 2025-01-21
Payer: MEDICARE

## 2025-01-21 VITALS
HEIGHT: 62 IN | BODY MASS INDEX: 38.09 KG/M2 | HEART RATE: 54 BPM | DIASTOLIC BLOOD PRESSURE: 49 MMHG | SYSTOLIC BLOOD PRESSURE: 105 MMHG | WEIGHT: 207 LBS

## 2025-01-21 DIAGNOSIS — R51.9 FREQUENT HEADACHES: ICD-10-CM

## 2025-01-21 DIAGNOSIS — J11.1 FLU: Primary | ICD-10-CM

## 2025-01-21 DIAGNOSIS — G43.709 CHRONIC MIGRAINE WITHOUT AURA WITHOUT STATUS MIGRAINOSUS, NOT INTRACTABLE: Primary | ICD-10-CM

## 2025-01-21 DIAGNOSIS — G43.709 CHRONIC MIGRAINE WITHOUT AURA WITHOUT STATUS MIGRAINOSUS, NOT INTRACTABLE: ICD-10-CM

## 2025-01-21 DIAGNOSIS — R79.89 PROLACTIN INCREASED: ICD-10-CM

## 2025-01-21 DIAGNOSIS — E23.6 EMPTY SELLA (HCC): ICD-10-CM

## 2025-01-21 PROCEDURE — 99204 OFFICE O/P NEW MOD 45 MIN: CPT | Performed by: PHYSICIAN ASSISTANT

## 2025-01-21 RX ORDER — ZOLMITRIPTAN 5 MG/1
TABLET, FILM COATED ORAL
Refills: 0 | OUTPATIENT
Start: 2025-01-21

## 2025-01-21 RX ORDER — KETOROLAC TROMETHAMINE 10 MG/1
10 TABLET, FILM COATED ORAL EVERY 6 HOURS PRN
Qty: 10 TABLET | Refills: 0 | Status: SHIPPED | OUTPATIENT
Start: 2025-01-21

## 2025-01-21 RX ORDER — RIZATRIPTAN BENZOATE 10 MG/1
TABLET, ORALLY DISINTEGRATING ORAL
Qty: 9 TABLET | Refills: 2 | Status: SHIPPED | OUTPATIENT
Start: 2025-01-21

## 2025-01-21 RX ORDER — PROCHLORPERAZINE MALEATE 10 MG
10 TABLET ORAL EVERY 6 HOURS PRN
Qty: 30 TABLET | Refills: 0 | Status: SHIPPED | OUTPATIENT
Start: 2025-01-21

## 2025-01-21 RX ORDER — OSELTAMIVIR PHOSPHATE 75 MG/1
75 CAPSULE ORAL EVERY 12 HOURS SCHEDULED
Qty: 10 CAPSULE | Refills: 0 | Status: SHIPPED | OUTPATIENT
Start: 2025-01-21 | End: 2025-01-26

## 2025-01-21 RX ORDER — ACETAZOLAMIDE 250 MG/1
250 TABLET ORAL 2 TIMES DAILY
Qty: 60 TABLET | Refills: 5 | Status: SHIPPED | OUTPATIENT
Start: 2025-01-21

## 2025-01-21 NOTE — ASSESSMENT & PLAN NOTE
Orders:    MRV head wo contrast; Future    acetaZOLAMIDE (DIAMOX) 250 mg tablet; Take 1 tablet (250 mg total) by mouth 2 (two) times a day

## 2025-01-21 NOTE — ASSESSMENT & PLAN NOTE
Ms. Park A Day is here for worsening migraine headaches.  As noted below the patient has findings of possible increased intracranial pressure.  Will evaluate with a dilated eye exam/visual field test with an ophthalmologist.  Will order MRV, and if necessary in the future lumbar puncture with opening pressure.  I am not ordering a lumbar puncture now in the case that the patient has relief with Diamox.  If no improvement with Diamox or only partial improvement, or if other testing is inconclusive, we will move forward with lumbar puncture and opening pressure.  She is agreeable to try Diamox for prevention.  As needed Maxalt, sumatriptan ineffective, plus or minus Toradol and Compazine.  Labs ordered to rule out other reversible causes.  She was instructed to keep a journal to identify any other triggers or patterns for her headaches.    Orders:    Ambulatory Referral to Ophthalmology; Future    MRV head wo contrast; Future    ketorolac (TORADOL) 10 mg tablet; Take 1 tablet (10 mg total) by mouth every 6 (six) hours as needed (migraine) Max 2-3 per week.    rizatriptan (MAXALT-MLT) 10 mg disintegrating tablet; 1 tab at migraine onset, repeat after 2 hours if needed; Max 2 per day and 3 per week.    prochlorperazine (COMPAZINE) 10 mg tablet; Take 1 tablet (10 mg total) by mouth every 6 (six) hours as needed for nausea or vomiting    Vitamin B12; Future    Vitamin D 25 hydroxy; Future    Folate; Future    acetaZOLAMIDE (DIAMOX) 250 mg tablet; Take 1 tablet (250 mg total) by mouth 2 (two) times a day

## 2025-01-21 NOTE — TELEPHONE ENCOUNTER
Chart reviewed  Rizatriptan was sent to [facility] pharmacy this morning at 0844    Called pt and states that per [facility], rizatriptan is not covered by her ins. If ins won't cover rizatriptan, she wants to go back on sumatriptan. Advised pt that I will call [facility] pharmacy to make sure they're processing it correctly (9 tabs per 30 days) then will call her back. Pt verbalized understanding.     Called [facility] pharmacy re: rizatriptan, spoke to pharmacist Cisco and states that it was rejecting bec it was showing duplication of therapy bec sumatriptan was dispensed on 1/4/25. Advised Cisco that sumatriptan was discontinued. He verbalized understanding and states that I need to call william at [phone] and let them know that sumatriptan was d/c'd.     Called william, spoke to Nazanin. I was transferred to another dept. I was placed on hold for >35 min.    Will call tmrw

## 2025-01-21 NOTE — ASSESSMENT & PLAN NOTE
?etiology psych med- risperdal  Possible contirbuting to increased ICP  Seeing psych today- diony Ho

## 2025-01-21 NOTE — PROGRESS NOTES
Name: Vivian You      : 1985      MRN: [id number]  Encounter Provider: Chasity Nina PA-C  Encounter Date: 2025   Encounter department: [facility] [place]  :  Assessment & Plan  Chronic migraine without aura without status migrainosus, not intractable  Ms. Vivian You is here for worsening migraine headaches.  As noted below the patient has findings of possible increased intracranial pressure.  Will evaluate with a dilated eye exam/visual field test with an ophthalmologist.  Will order MRV, and if necessary in the future lumbar puncture with opening pressure.  I am not ordering a lumbar puncture now in the case that the patient has relief with Diamox.  If no improvement with Diamox or only partial improvement, or if other testing is inconclusive, we will move forward with lumbar puncture and opening pressure.  She is agreeable to try Diamox for prevention.  As needed Maxalt, sumatriptan ineffective, plus or minus Toradol and Compazine.  Labs ordered to rule out other reversible causes.  She was instructed to keep a journal to identify any other triggers or patterns for her headaches.    Orders:    Ambulatory Referral to Ophthalmology; Future    MRV head wo contrast; Future    ketorolac (TORADOL) 10 mg tablet; Take 1 tablet (10 mg total) by mouth every 6 (six) hours as needed (migraine) Max 2-3 per week.    rizatriptan (MAXALT-MLT) 10 mg disintegrating tablet; 1 tab at migraine onset, repeat after 2 hours if needed; Max 2 per day and 3 per week.    prochlorperazine (COMPAZINE) 10 mg tablet; Take 1 tablet (10 mg total) by mouth every 6 (six) hours as needed for nausea or vomiting    Vitamin B12; Future    Vitamin D 25 hydroxy; Future    Folate; Future    acetaZOLAMIDE (DIAMOX) 250 mg tablet; Take 1 tablet (250 mg total) by mouth 2 (two) times a day    Empty sella (HCC)    Orders:    MRV head wo contrast; Future    acetaZOLAMIDE (DIAMOX) 250 mg tablet; Take 1 tablet (250 mg total)  "by mouth 2 (two) times a day    Prolactin increased  ?etiology psych med- risperdal  Possible contirbuting to increased ICP  Seeing psych today- diony Ho       Frequent headaches    Orders:    Ambulatory Referral to Neurology        The patient should not hesitate to call me prior to her follow up with any questions or concerns.      There are no Patient Instructions on file for this visit.     History of Present Illness   HPI     Ms. Vivian PLATA Day is a pleasant 39-year-old female who is here for neurological consultation for headaches.  New patient migraines, migraine have been pretty bad lately, they are becoming more frequent. Had MRI a few weeks ago, she saw something mentioned on it about migraine. PCP gave her Imitrex is sometimes helps.    Brain mri pituitary w/wo 1/2/25:  \"1. No pituitary lesion identified to suggest a microadenoma or macroadenoma.  2. Partially empty sella with apparent stenoses involving the bilateral distal transverse dural venous sinuses, which can be seen in the setting of idiopathic intracranial hypertension. Correlation with the patient's clinical history, and ophthalmologic funduscopic findings is recommended, and if needed, consider further evaluation with lumbar puncture and opening CSF pressure measurement.  3. No acute infarct. Scattered T2/FLAIR hyperintensities involving the white matter of both cerebral hemispheres, nonspecific, but which likely represent chronic microangiopathic changes, or the sequela of migraines.\"      Migraines/headaches:    Onset: For started at 20 yo (2005) when she was pregnant with her son, worse over the past 6 months now.  She also has a finding of high prolactin in December, which is thought to possibly be from risperidone.  She has been on risperidone for about 2 years.  She is seeing her psychiatrist today to discuss this.  No pituitary adenoma per brain MRI.  Head injury: None  Current pain: 3-4/10  Reaches pain level at worst: " 10/10  Frequency: 2/week and 4/month, or more  She typically wakes up with them, but not every morning, and as the day goes on it gets worse.  Medications are not helping.  Duration: 2 to 3 hours depending on whether medication works, can last for the entire day  Location: Bilateral temples, holoacranial, sharp in the back of the head and sometimes radiate into the neck  Quality: Throbbing, pressure, pulsing, achy, tight band, shooting down the back of the neck  She typically misses 3 workdays on average per month due to a headache.  Associated with: Nausea, vomiting, sore/stiff neck, concentration problems, decreased appetite, prefers a quiet and dark room, lightheaded/dizzy, light and sound sensitivity  Triggers: Stress, coughing, sunlight, menstruation, weather changes  Aura/ warning: None, although sometimes she does get blurry vision but not a warning sign    Medications tried:  Prevention-  Magnesium oxide  B2    Abortive-  Imitrex- does not really help, helps a little  Tylenol  Ibuprofen    Other non-medication therapies or treatments- none    Neck pain and description: sharp pain down the head and into the neck b/l with a migraine  Sleep concerns: sometimes but overall sleeps okay  Family planning: mirena    Family hx of migraines: brother  Family hx of cerebral aneurysms: no    Lifestyle:  Hydration- 40-60 oz daily  Caffeine- soda at lunch, that is it and trying to cut back  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   HENT: Negative.  Negative for hearing loss, tinnitus, trouble swallowing and voice change.    Eyes:  Positive for photophobia. Negative for pain and visual disturbance.   Respiratory: Negative.  Negative for shortness of breath.    Cardiovascular: Negative.  Negative for palpitations.   Gastrointestinal:  Positive for nausea and vomiting.   Endocrine: Negative.  Negative for cold intolerance.   Genitourinary:  Negative.  Negative for dysuria, frequency and urgency.   Musculoskeletal:  Negative for back pain, gait problem, myalgias, neck pain and neck stiffness.   Skin: Negative.  Negative for rash.   Allergic/Immunologic: Negative.    Neurological:  Positive for dizziness, light-headedness and headaches. Negative for tremors, seizures, syncope, facial asymmetry, speech difficulty, weakness and numbness.   Hematological: Negative.  Does not bruise/bleed easily.   Psychiatric/Behavioral: Negative.  Negative for confusion, hallucinations and sleep disturbance.     I have personally reviewed the MA's review of systems and made changes as necessary.    Pertinent Medical History         Medical History Reviewed by provider this encounter:  Tobacco  Allergies  Meds  Problems  Med Hx  Surg Hx  Fam Hx     .  Past Medical History   Past Medical History:   Diagnosis Date    Anxiety     Asthma     Bipolar 1 disorder (HCC)     Depression     IBS (irritable bowel syndrome)     diarrhea    Insomnia     Migraine     PTSD (post-traumatic stress disorder)     UTI (urinary tract infection)      Past Surgical History:   Procedure Laterality Date    ANKLE HARDWARE REMOVAL Right     ANKLE SURGERY Right     CHOLECYSTECTOMY      FOOT SURGERY Left     with hardware    MOUTH SURGERY  2011    metal placed from broken jaw    ORIF MANDIBULAR FRACTURE Bilateral 06/03/2019    Procedure: MANDIBLE CARROLL REMOVAL AND HARWARE REMOVAL FROM LEFT MANDIBLE ALVEOLAR RIDGE;  Surgeon: Lucretia Sierra DMD;  Location:  MAIN OR;  Service: Maxillofacial    OTHER SURGICAL HISTORY      unlisted craniofacial and maxillofacial procedure    WI COLPOSCOPY CERVIX VAG LOOP ELTRD BX CERVIX N/A 3/17/2023    Procedure: BIOPSY LEEP CERVIX;  Surgeon: Yardlie Toussaint-Foster, DO;  Location: [facility];  Service: Gynecology    WI CYSTOURETHROSCOPY N/A 10/24/2023    Procedure: CYSTO; INJECTION BULKING AGENT URETHRAL;  Surgeon: Jenny Wagner MD;  Location: [facility];   Service: Gynecology    US GUIDED BREAST BIOPSY RIGHT COMPLETE Right 3/29/2023     Family History   Problem Relation Age of Onset    Lung cancer Mother     Brain cancer Mother     Bone cancer Mother     Cancer Mother     Lung cancer Father     Bone cancer Father     Lung cancer Paternal Grandfather     Liver cancer Maternal Uncle     Lung cancer Paternal Uncle     No Known Problems Paternal Aunt     No Known Problems Paternal Aunt       reports that she has been smoking cigarettes. She started smoking about 26 years ago. She has a 6.5 pack-year smoking history. She has been exposed to tobacco smoke. She has never used smokeless tobacco. She reports current alcohol use. She reports current drug use. Drug: Marijuana.  Current Outpatient Medications on File Prior to Visit   Medication Sig Dispense Refill    acetaminophen (TYLENOL) 650 mg CR tablet Take 1 tablet (650 mg total) by mouth every 8 (eight) hours as needed for mild pain 30 tablet 0    albuterol (PROVENTIL HFA,VENTOLIN HFA) 90 mcg/act inhaler Inhale 1 puff if needed for wheezing INHALE 1 PUFF EVERY 4 HOURS AS NEEDED FOR WHEEZING. 16 g 0    clonazePAM (KlonoPIN) 0.5 mg tablet Take 0.5 mg by mouth daily      Magnesium 400 MG TABS Take 1 tablet (400 mg total) by mouth in the morning 90 tablet 1    magnesium oxide (MAG-OX) 400 mg tablet TAKE 1 TABLET (400 MG TOTAL) BY MOUTH IN THE MORNING      Mirena, 52 MG, 20 MCG/DAY IUD       Multiple Vitamins-Minerals (WOMENS 50+ MULTI VITAMIN PO) Take by mouth      nicotine (NICODERM CQ) 21 mg/24 hr TD 24 hr patch PLACE 1 PATCH ON THE SKIN OVER 24 HOURS EVERY 24 HOURS 28 patch 0    ondansetron (ZOFRAN-ODT) 4 mg disintegrating tablet Take 1 tablet (4 mg total) by mouth every 6 (six) hours as needed for nausea or vomiting 12 tablet 0    OXcarbazepine (TRILEPTAL) 150 mg tablet Take 150 mg by mouth daily      PARoxetine (PAXIL) 20 mg tablet Take 1 tablet (20 mg total) by mouth daily 30 tablet 2    Riboflavin 100 MG TABS Take 4  tablets (400 mg total) by mouth in the morning 120 tablet 6    risperiDONE (RisperDAL) 4 mg tablet Take 1 tablet (4 mg total) by mouth daily 30 tablet 6    traZODone (DESYREL) 50 mg tablet Take 50 mg by mouth daily      zolpidem (AMBIEN) 10 mg tablet Take 10 mg by mouth daily at bedtime      [DISCONTINUED] SUMAtriptan (IMITREX) 50 mg tablet Take 1 tablet (50 mg total) by mouth once as needed for migraine for up to 1 dose may repeat in 2 hours if necessary, no more than 2 doses in 24 hours, no more than 3 times per week 10 tablet 5    multivitamin (THERAGRAN) TABS Take 1 tablet by mouth daily (Patient not taking: Reported on 1/21/2025)       No current facility-administered medications on file prior to visit.     Allergies   Allergen Reactions    Codeine GI Intolerance    Fentanyl      Flushing    Penicillins Headache, Other (See Comments) and Vomiting     Severe vomitting      Naproxen Rash    Wellbutrin [Bupropion] Rash      Current Outpatient Medications on File Prior to Visit   Medication Sig Dispense Refill    acetaminophen (TYLENOL) 650 mg CR tablet Take 1 tablet (650 mg total) by mouth every 8 (eight) hours as needed for mild pain 30 tablet 0    albuterol (PROVENTIL HFA,VENTOLIN HFA) 90 mcg/act inhaler Inhale 1 puff if needed for wheezing INHALE 1 PUFF EVERY 4 HOURS AS NEEDED FOR WHEEZING. 16 g 0    clonazePAM (KlonoPIN) 0.5 mg tablet Take 0.5 mg by mouth daily      Magnesium 400 MG TABS Take 1 tablet (400 mg total) by mouth in the morning 90 tablet 1    magnesium oxide (MAG-OX) 400 mg tablet TAKE 1 TABLET (400 MG TOTAL) BY MOUTH IN THE MORNING      Mirena, 52 MG, 20 MCG/DAY IUD       Multiple Vitamins-Minerals (WOMENS 50+ MULTI VITAMIN PO) Take by mouth      nicotine (NICODERM CQ) 21 mg/24 hr TD 24 hr patch PLACE 1 PATCH ON THE SKIN OVER 24 HOURS EVERY 24 HOURS 28 patch 0    ondansetron (ZOFRAN-ODT) 4 mg disintegrating tablet Take 1 tablet (4 mg total) by mouth every 6 (six) hours as needed for nausea or  "vomiting 12 tablet 0    OXcarbazepine (TRILEPTAL) 150 mg tablet Take 150 mg by mouth daily      PARoxetine (PAXIL) 20 mg tablet Take 1 tablet (20 mg total) by mouth daily 30 tablet 2    Riboflavin 100 MG TABS Take 4 tablets (400 mg total) by mouth in the morning 120 tablet 6    risperiDONE (RisperDAL) 4 mg tablet Take 1 tablet (4 mg total) by mouth daily 30 tablet 6    traZODone (DESYREL) 50 mg tablet Take 50 mg by mouth daily      zolpidem (AMBIEN) 10 mg tablet Take 10 mg by mouth daily at bedtime      [DISCONTINUED] SUMAtriptan (IMITREX) 50 mg tablet Take 1 tablet (50 mg total) by mouth once as needed for migraine for up to 1 dose may repeat in 2 hours if necessary, no more than 2 doses in 24 hours, no more than 3 times per week 10 tablet 5    multivitamin (THERAGRAN) TABS Take 1 tablet by mouth daily (Patient not taking: Reported on 1/21/2025)       No current facility-administered medications on file prior to visit.      Social History     Tobacco Use    Smoking status: Every Day     Current packs/day: 0.25     Average packs/day: 0.3 packs/day for 26.1 years (6.5 ttl pk-yrs)     Types: Cigarettes     Start date: 1999     Passive exposure: Current    Smokeless tobacco: Never    Tobacco comments:     2-3 cigarettes a day last smoked 10.23.23   Vaping Use    Vaping status: Never Used   Substance and Sexual Activity    Alcohol use: Yes    Drug use: Yes     Types: Marijuana     Comment: medicated marijuana    Sexual activity: Yes     Partners: Male     Birth control/protection: Abstinence        Objective   BP (!) 105/49 (BP Location: Right arm, Patient Position: Sitting, Cuff Size: Standard)   Pulse (!) 54   Ht 5' 2\" (1.575 m)   Wt 93.9 kg (207 lb)   BMI 37.86 kg/m²     Physical Exam  Neurological Exam  Vital signs reviewed.  Well developed, well nourished.  Mood and affect are pleasant.  Speech is fluent and articulate.  Head: Normocephalic, atraumatic  Neck: Neck flexors 5/5, some TTP b/l occipital regions  CN " 2-12: intact and symmetric, including EOMs which are normal b/l and PERRL  +enlarged tonsils, mild, and pt currently sick    MSK: 5/5 t/o. ROM normal x all 4 extr.  Reflexes: 2+ and symmetric in all 4 extr.- ?? Slight difficulty eliciting the R knee, better with different angles- will recheck next visit  Coordination: Nml x4 extr.  Gait: Steady normal gait.      Administrative Statements   I have spent a total time of 45 minutes in caring for this patient on the day of the visit/encounter including Diagnostic results, Risks and benefits of tx options, Instructions for management, Patient and family education, Risk factor reductions, Impressions, Counseling / Coordination of care, Documenting in the medical record, Reviewing / ordering tests, medicine, procedures  , and Obtaining or reviewing history  . Topics discussed with the patient / family include symptom assessment and management, medication review, medication adjustment, and supportive listening.

## 2025-01-21 NOTE — TELEPHONE ENCOUNTER
01/21/25    Patient called office today to notify that she received a message from her Pharmacy in-regard of ZOLMITRIPTAN Medication being denied, and that a notification has been sent to our office for an alternative medication.    Patient is ok if an Alternative medication haves to be prescribed, but its requesting if the alternative medication that it picked it get denied, she wants to know if she can go back to IMITREX.      Please contact Patient with any status of Medication matter and answer to her question.  Thank You.

## 2025-01-22 NOTE — TELEPHONE ENCOUNTER
Called [facility]. On hold for 25 min. Spoke to Radha. I was transferred another dept. Spoke to Jing and advised of the below. Sumatriptan d/c'd on 1/21/25 by LIBAN. She verbalized understanding. She was able to enter an override for rizatriptan 9 tabs per 30 days. Test claim was processed successfully. States that pharmacy can reprocess w/ todays date.     Called [facility] pharmacy, spoke to pharmacist Cisco and made aware of the above. He verbalized understanding.   He was able to get a paid claim.     Pt made aware of all of the above and not to take sumatriptan w/ rizatriptan. Once she is out of sumatriptan then take rizatriptan as prescribed. She verbalized understanding

## 2025-01-22 NOTE — TELEPHONE ENCOUNTER
Called [facility]. On hold for >20 min.     Called and advised pt of all of the below and above. I will try to call her ins again later today. Pt verbalized understanding

## 2025-01-24 ENCOUNTER — OCCMED (OUTPATIENT)
Dept: URGENT CARE | Facility: MEDICAL CENTER | Age: 40
End: 2025-01-24
Payer: OTHER MISCELLANEOUS

## 2025-01-24 PROCEDURE — 99213 OFFICE O/P EST LOW 20 MIN: CPT | Performed by: NURSE PRACTITIONER

## 2025-02-02 DIAGNOSIS — F41.9 ANXIETY: ICD-10-CM

## 2025-02-02 DIAGNOSIS — F32.A DEPRESSION, UNSPECIFIED DEPRESSION TYPE: ICD-10-CM

## 2025-02-03 RX ORDER — PAROXETINE 20 MG/1
20 TABLET, FILM COATED ORAL DAILY
Qty: 30 TABLET | Refills: 2 | Status: SHIPPED | OUTPATIENT
Start: 2025-02-03

## 2025-02-11 DIAGNOSIS — Z72.0 TOBACCO USE: ICD-10-CM

## 2025-02-12 RX ORDER — NICOTINE 21 MG/24HR
1 PATCH, TRANSDERMAL 24 HOURS TRANSDERMAL EVERY 24 HOURS
Qty: 28 PATCH | Refills: 0 | Status: SHIPPED | OUTPATIENT
Start: 2025-02-12

## 2025-02-20 ENCOUNTER — ANNUAL EXAM (OUTPATIENT)
Dept: OBGYN CLINIC | Facility: CLINIC | Age: 40
End: 2025-02-20

## 2025-02-20 VITALS — BODY MASS INDEX: 39.14 KG/M2 | DIASTOLIC BLOOD PRESSURE: 80 MMHG | WEIGHT: 214 LBS | SYSTOLIC BLOOD PRESSURE: 130 MMHG

## 2025-02-20 DIAGNOSIS — Z53.21 PATIENT LEFT WITHOUT BEING SEEN: Primary | ICD-10-CM

## 2025-02-20 PROCEDURE — PBNCHG PB NO CHARGE PLACEHOLDER: Performed by: NURSE PRACTITIONER

## 2025-03-07 ENCOUNTER — HOSPITAL ENCOUNTER (OUTPATIENT)
Dept: MRI IMAGING | Facility: HOSPITAL | Age: 40
Discharge: HOME | End: 2025-03-07
Payer: MEDICARE

## 2025-03-07 DIAGNOSIS — G43.709 CHRONIC MIGRAINE WITHOUT AURA WITHOUT STATUS MIGRAINOSUS, NOT INTRACTABLE: ICD-10-CM

## 2025-03-07 DIAGNOSIS — E23.6 EMPTY SELLA (HCC): ICD-10-CM

## 2025-03-07 PROCEDURE — 70544 MR ANGIOGRAPHY HEAD W/O DYE: CPT

## 2025-03-13 ENCOUNTER — OFFICE VISIT (OUTPATIENT)
Dept: FAMILY MEDICINE CLINIC | Facility: CLINIC | Age: 40
End: 2025-03-13

## 2025-03-13 ENCOUNTER — OFFICE VISIT (OUTPATIENT)
Dept: OBGYN CLINIC | Facility: CLINIC | Age: 40
End: 2025-03-13

## 2025-03-13 VITALS
TEMPERATURE: 98 F | RESPIRATION RATE: 16 BRPM | OXYGEN SATURATION: 97 % | HEART RATE: 74 BPM | DIASTOLIC BLOOD PRESSURE: 60 MMHG | WEIGHT: 216 LBS | BODY MASS INDEX: 39.75 KG/M2 | SYSTOLIC BLOOD PRESSURE: 98 MMHG | HEIGHT: 62 IN

## 2025-03-13 VITALS — WEIGHT: 215.6 LBS | SYSTOLIC BLOOD PRESSURE: 120 MMHG | DIASTOLIC BLOOD PRESSURE: 78 MMHG | BODY MASS INDEX: 39.43 KG/M2

## 2025-03-13 DIAGNOSIS — J45.20 MILD INTERMITTENT ASTHMA WITHOUT COMPLICATION: ICD-10-CM

## 2025-03-13 DIAGNOSIS — N92.0 MENORRHAGIA WITH REGULAR CYCLE: ICD-10-CM

## 2025-03-13 DIAGNOSIS — E66.09 CLASS 2 OBESITY DUE TO EXCESS CALORIES WITHOUT SERIOUS COMORBIDITY WITH BODY MASS INDEX (BMI) OF 39.0 TO 39.9 IN ADULT: ICD-10-CM

## 2025-03-13 DIAGNOSIS — N93.9 ABNORMAL UTERINE BLEEDING: Primary | ICD-10-CM

## 2025-03-13 DIAGNOSIS — G89.29 CHRONIC RIGHT-SIDED LOW BACK PAIN WITH SCIATICA, SCIATICA LATERALITY UNSPECIFIED: ICD-10-CM

## 2025-03-13 DIAGNOSIS — M54.40 CHRONIC RIGHT-SIDED LOW BACK PAIN WITH SCIATICA, SCIATICA LATERALITY UNSPECIFIED: ICD-10-CM

## 2025-03-13 DIAGNOSIS — R30.0 DYSURIA: Primary | ICD-10-CM

## 2025-03-13 DIAGNOSIS — E66.812 CLASS 2 OBESITY DUE TO EXCESS CALORIES WITHOUT SERIOUS COMORBIDITY WITH BODY MASS INDEX (BMI) OF 39.0 TO 39.9 IN ADULT: ICD-10-CM

## 2025-03-13 DIAGNOSIS — G43.909 MIGRAINE WITHOUT STATUS MIGRAINOSUS, NOT INTRACTABLE, UNSPECIFIED MIGRAINE TYPE: ICD-10-CM

## 2025-03-13 LAB
SL AMB  POCT GLUCOSE, UA: ABNORMAL
SL AMB LEUKOCYTE ESTERASE,UA: ABNORMAL
SL AMB POCT BILIRUBIN,UA: ABNORMAL
SL AMB POCT BLOOD,UA: ABNORMAL
SL AMB POCT CLARITY,UA: ABNORMAL
SL AMB POCT COLOR,UA: YELLOW
SL AMB POCT KETONES,UA: ABNORMAL
SL AMB POCT NITRITE,UA: ABNORMAL
SL AMB POCT PH,UA: 7
SL AMB POCT SPECIFIC GRAVITY,UA: 1.02
SL AMB POCT URINE PROTEIN: ABNORMAL
SL AMB POCT UROBILINOGEN: ABNORMAL

## 2025-03-13 PROCEDURE — 87086 URINE CULTURE/COLONY COUNT: CPT | Performed by: NURSE PRACTITIONER

## 2025-03-13 PROCEDURE — 81002 URINALYSIS NONAUTO W/O SCOPE: CPT | Performed by: NURSE PRACTITIONER

## 2025-03-13 PROCEDURE — 96372 THER/PROPH/DIAG INJ SC/IM: CPT | Performed by: NURSE PRACTITIONER

## 2025-03-13 PROCEDURE — 99213 OFFICE O/P EST LOW 20 MIN: CPT | Performed by: NURSE PRACTITIONER

## 2025-03-13 PROCEDURE — 99213 OFFICE O/P EST LOW 20 MIN: CPT | Performed by: OBSTETRICS & GYNECOLOGY

## 2025-03-13 RX ORDER — OXCARBAZEPINE 300 MG/1
1 TABLET, FILM COATED ORAL 2 TIMES DAILY
COMMUNITY
Start: 2025-02-17

## 2025-03-13 RX ORDER — TIZANIDINE 2 MG/1
2 TABLET ORAL EVERY 8 HOURS PRN
Qty: 40 TABLET | Refills: 0 | Status: SHIPPED | OUTPATIENT
Start: 2025-03-13

## 2025-03-13 RX ORDER — CLONAZEPAM 1 MG/1
1 TABLET ORAL DAILY
COMMUNITY
Start: 2025-02-20

## 2025-03-13 RX ORDER — METHYLPREDNISOLONE 4 MG/1
TABLET ORAL
Qty: 21 EACH | Refills: 0 | Status: SHIPPED | OUTPATIENT
Start: 2025-03-13

## 2025-03-13 RX ORDER — IBUPROFEN 600 MG/1
600 TABLET, FILM COATED ORAL EVERY 6 HOURS PRN
Qty: 20 TABLET | Refills: 0 | Status: SHIPPED | OUTPATIENT
Start: 2025-03-13 | End: 2025-03-18

## 2025-03-13 RX ORDER — ARIPIPRAZOLE 5 MG/1
TABLET ORAL
COMMUNITY
Start: 2025-02-17

## 2025-03-13 RX ORDER — KETOROLAC TROMETHAMINE 30 MG/ML
30 INJECTION, SOLUTION INTRAMUSCULAR; INTRAVENOUS ONCE
Status: COMPLETED | OUTPATIENT
Start: 2025-03-13 | End: 2025-03-13

## 2025-03-13 RX ADMIN — KETOROLAC TROMETHAMINE 30 MG: 30 INJECTION, SOLUTION INTRAMUSCULAR; INTRAVENOUS at 16:43

## 2025-03-13 NOTE — ASSESSMENT & PLAN NOTE
Will treat acute exacerbation   Recommend follow up with neurology and opthalmology     Orders:    ketorolac (TORADOL) injection 30 mg    methylPREDNISolone 4 MG tablet therapy pack; Use as directed on package    tiZANidine (ZANAFLEX) 2 mg tablet; Take 1 tablet (2 mg total) by mouth every 8 (eight) hours as needed for muscle spasms

## 2025-03-13 NOTE — LETTER
March 13, 2025     Patient: Vivian You   YOB: 1985   Date of Visit: 3/13/2025       To Whom it May Concern:    Vivian You was seen in my clinic on 3/13/2025. She may return to work on 3/17/2025 .    If you have any questions or concerns, please don't hesitate to call.         Sincerely,          GISSELLE Manzano        CC: No Recipients

## 2025-03-13 NOTE — ASSESSMENT & PLAN NOTE
Wt Readings from Last 3 Encounters:   03/13/25 98 kg (216 lb)   03/13/25 97.8 kg (215 lb 9.6 oz)   02/20/25 97.1 kg (214 lb)     -Encouraged diet and lifestyle changes: decrease processed foods (cakes, cookies, chips, soda), decrease total carbohydrate intake, decrease fried/fatty foods, increase fruits and vegetables, increase lean proteins (chicken, turkey), increase healthy fats (avocado, fish, nuts), drink plenty of water (at least four 16 oz bottles per day)

## 2025-03-13 NOTE — PROGRESS NOTES
Name: Vivian You      : 1985      MRN: [id number]  Encounter Provider: GISSELLE Manzano  Encounter Date: 3/13/2025   Encounter department: [facility]  :  Assessment & Plan  Dysuria  +leukocytes, will send for cx     Orders:    POCT urine dip    Urine culture    Migraine without status migrainosus, not intractable, unspecified migraine type  Will treat acute exacerbation   Recommend follow up with neurology and opthalmology     Orders:    ketorolac (TORADOL) injection 30 mg    methylPREDNISolone 4 MG tablet therapy pack; Use as directed on package    tiZANidine (ZANAFLEX) 2 mg tablet; Take 1 tablet (2 mg total) by mouth every 8 (eight) hours as needed for muscle spasms    Chronic right-sided low back pain with sciatica, sciatica laterality unspecified    Orders:    tiZANidine (ZANAFLEX) 2 mg tablet; Take 1 tablet (2 mg total) by mouth every 8 (eight) hours as needed for muscle spasms    Mild intermittent asthma without complication  Stable, continue albuterol PRN          Class 2 obesity due to excess calories without serious comorbidity with body mass index (BMI) of 39.0 to 39.9 in adult    Wt Readings from Last 3 Encounters:   25 98 kg (216 lb)   25 97.8 kg (215 lb 9.6 oz)   25 97.1 kg (214 lb)     -Encouraged diet and lifestyle changes: decrease processed foods (cakes, cookies, chips, soda), decrease total carbohydrate intake, decrease fried/fatty foods, increase fruits and vegetables, increase lean proteins (chicken, turkey), increase healthy fats (avocado, fish, nuts), drink plenty of water (at least four 16 oz bottles per day)               BMI Counseling: Body mass index is 39.51 kg/m². The BMI is above normal. Nutrition recommendations include decreasing fast food intake, consuming healthier snacks and limiting drinks that contain sugar. Exercise recommendations include exercising 3-5 times per week. Rationale for BMI follow-up plan is  "due to patient being overweight or obese.       History of Present Illness   Patient is a 38 YO female who  has a past medical history of Anxiety, Asthma, Bipolar 1 disorder (HCC), Depression, IBS (irritable bowel syndrome), Insomnia, Migraine, PTSD (post-traumatic stress disorder), and UTI (urinary tract infection).    She presents for follow up. Reports that she is having frequent, almost daily HA despite starting Diamox. She has FU with neuro in May. Denies dizziness, change in vision, confusion, or weakness.     The following portions of the patient's history were reviewed and updated as appropriate: allergies, current medications, past family history, past medical history, past social history, past surgical history and problem list.      Migraine  This is a chronic problem. The current episode started more than 1 year ago. The problem occurs intermittently. The problem has been waxing and waning since onset. The pain is present in the right unilateral. The pain does not radiate. The quality of the pain is described as throbbing, squeezing, shooting and pulsating. The pain is at a severity of 8/10. Pertinent negatives include no abdominal pain, back pain, coughing, dizziness, fever, hearing loss, nausea, numbness, photophobia, seizures, sore throat, vomiting or weakness.     Review of Systems   Constitutional:  Negative for fever.   HENT:  Negative for hearing loss and sore throat.    Eyes:  Negative for photophobia.   Respiratory:  Negative for cough.    Gastrointestinal:  Negative for abdominal pain, nausea and vomiting.   Musculoskeletal:  Negative for back pain.   Neurological:  Positive for headaches. Negative for dizziness, seizures, weakness and numbness.       Objective   BP 98/60 (BP Location: Right arm, Patient Position: Sitting, Cuff Size: Large)   Pulse 74   Temp 98 °F (36.7 °C) (Temporal)   Resp 16   Ht 5' 2\" (1.575 m)   Wt 98 kg (216 lb)   LMP 02/27/2025 (Approximate)   SpO2 97%   BMI 39.51 " kg/m²      Physical Exam  Vitals and nursing note reviewed.   Constitutional:       General: She is not in acute distress.     Appearance: She is well-developed.   HENT:      Head: Normocephalic and atraumatic.      Right Ear: External ear normal.      Left Ear: External ear normal.   Eyes:      Conjunctiva/sclera: Conjunctivae normal.   Cardiovascular:      Rate and Rhythm: Normal rate and regular rhythm.   Pulmonary:      Effort: Pulmonary effort is normal. No respiratory distress.      Breath sounds: Normal breath sounds.   Abdominal:      Palpations: Abdomen is soft.      Tenderness: There is no abdominal tenderness.   Musculoskeletal:         General: No swelling.      Cervical back: Neck supple.   Skin:     General: Skin is warm and dry.      Capillary Refill: Capillary refill takes less than 2 seconds.   Neurological:      Mental Status: She is alert. Mental status is at baseline.   Psychiatric:         Mood and Affect: Mood normal.

## 2025-03-13 NOTE — PROGRESS NOTES
Name: Vivian You      : 1985      MRN: [id number]  Encounter Provider: Julieta Sharif MD  Encounter Date: 3/13/2025   Encounter department: [facility]  :  Assessment & Plan  Abnormal uterine bleeding    Orders:    ibuprofen (MOTRIN) 600 mg tablet; Take 1 tablet (600 mg total) by mouth every 6 (six) hours as needed for mild pain for up to 5 days During menses    Menorrhagia with regular cycle  Endorses a long history of menorrhagia not controlled by OCPs  IUD placed 2023 with initial resolution of bleeding and now with continued AUB  TVUS 3/2024 secondary to pelvic pain showed IUD in place, repeat TVUS 2024 again showed IUD in place   CBC/TSH wnl, prolactin elevated x 2 however head MRI with no signs of prolactinoma, thought to be secondary to medications   Given history of migraines w/ aura, not candidate for estrogen containing therapies, reports significant difficulty with compliance with oral methods and declines depo provera injections   Given that she has now failed IUD and continues to experience AUB/pelvic pain she is interested in definitive management with surgery   Discussed option to trial ibuprofen 600 mg Q6H while bleeding in last attempt to control pain/bleeding prior to pursuing surgical management with hysterectomy   We reviewed that prior to pursing surgical management, endometrial biopsy is recommended to rule out underlying hyperplasia/malignancy and ensure that surgery is completed by appropriate team  Plan to RTO in 5 weeks to discuss bleeding/pain with this cycle with addition of ibuprofen and plan for endometrial biopsy at that time if no improvement and surgical management is still desired            History of Present Illness   HPI  Vivian You is a 39 y.o. female who presents for follow-up of abnormal uterine bleeding.  In review, Vivian had an IUD placed 2023 for control of menstruation.  Prior to placement of IUD she reports that  "her cycles were horrible.  Prior to IUD placement she was started on OCPs which did not provide adequate regulation of her cycle.  She has had irregular bleeding/spotting since placement of her IUD.  She underwent laboratory evaluation in December 2024 which was significant for an elevated prolactin level.  Repeat prolactin level was also elevated and a head MRI was completed with no signs of prolactinoma.  Since her last visit she endorses continued irregular bleeding with her IUD in place.  She endorses that she missed a cycle and then had a period \"a couple of weeks ago\".  She notes that the cycle lasted 4 days and each day got heavier.  She endorses cramping that is irregular and does not coincide with her cycle.  She endorses her pain being a 6 out of 10 while on her cycle.  She notes that this abnormal bleeding continues to be bothersome for her and she is interested in surgical management.  Of note, Vivian is not eligible for use of estrogen based contraceptives given her history of migraines with aura. She reports significant difficulty with remembering to take pills, thus is not interested in POPs. At this time given her continued pain and bleeding she is interested in surgical management.       Review of Systems   Gastrointestinal:  Positive for abdominal pain.   Genitourinary:  Positive for vaginal bleeding.   Musculoskeletal:  Positive for back pain.   All other systems reviewed and are negative.         Objective   /78 (BP Location: Left arm, Patient Position: Sitting, Cuff Size: Standard)   Wt 97.8 kg (215 lb 9.6 oz)   LMP 02/27/2025 (Approximate)   BMI 39.43 kg/m²      Physical Exam  Constitutional:       General: She is not in acute distress.     Appearance: Normal appearance.   HENT:      Head: Normocephalic and atraumatic.   Cardiovascular:      Rate and Rhythm: Normal rate.   Pulmonary:      Effort: Pulmonary effort is normal.   Skin:     General: Skin is warm and dry.   Neurological:      " General: No focal deficit present.      Mental Status: She is alert.   Psychiatric:         Mood and Affect: Mood normal.         Julieta Sharif MD  Obstetrics & Gynecology PGY-4  3/14/2025  4:31 PM

## 2025-03-13 NOTE — ASSESSMENT & PLAN NOTE
Orders:    tiZANidine (ZANAFLEX) 2 mg tablet; Take 1 tablet (2 mg total) by mouth every 8 (eight) hours as needed for muscle spasms

## 2025-03-14 PROBLEM — N93.9 ABNORMAL UTERINE BLEEDING: Status: ACTIVE | Noted: 2024-11-08

## 2025-03-14 RX ORDER — ALBUTEROL SULFATE 90 UG/1
1 INHALANT RESPIRATORY (INHALATION) AS NEEDED
Qty: 16 G | Refills: 0 | Status: SHIPPED | OUTPATIENT
Start: 2025-03-14

## 2025-03-14 NOTE — ASSESSMENT & PLAN NOTE
Orders:    ibuprofen (MOTRIN) 600 mg tablet; Take 1 tablet (600 mg total) by mouth every 6 (six) hours as needed for mild pain for up to 5 days During menses

## 2025-03-14 NOTE — ASSESSMENT & PLAN NOTE
Endorses a long history of menorrhagia not controlled by OCPs  IUD placed 5/2023 with initial resolution of bleeding and now with continued AUB  TVUS 3/2024 secondary to pelvic pain showed IUD in place, repeat TVUS 11/2024 again showed IUD in place   CBC/TSH wnl, prolactin elevated x 2 however head MRI with no signs of prolactinoma, thought to be secondary to medications   Given history of migraines w/ aura, not candidate for estrogen containing therapies, reports significant difficulty with compliance with oral methods and declines depo provera injections   Given that she has now failed IUD and continues to experience AUB/pelvic pain she is interested in definitive management with surgery   Discussed option to trial ibuprofen 600 mg Q6H while bleeding in last attempt to control pain/bleeding prior to pursuing surgical management with hysterectomy   We reviewed that prior to pursing surgical management, endometrial biopsy is recommended to rule out underlying hyperplasia/malignancy and ensure that surgery is completed by appropriate team  Plan to RTO in 5 weeks to discuss bleeding/pain with this cycle with addition of ibuprofen and plan for endometrial biopsy at that time if no improvement and surgical management is still desired

## 2025-03-16 LAB — BACTERIA UR CULT: NORMAL

## 2025-03-18 ENCOUNTER — RESULTS FOLLOW-UP (OUTPATIENT)
Dept: FAMILY MEDICINE CLINIC | Facility: CLINIC | Age: 40
End: 2025-03-18

## 2025-04-14 ENCOUNTER — HOSPITAL ENCOUNTER (OUTPATIENT)
Dept: RADIOLOGY | Facility: HOSPITAL | Age: 40
Discharge: HOME | End: 2025-04-14
Payer: MEDICARE

## 2025-04-14 ENCOUNTER — OFFICE VISIT (OUTPATIENT)
Dept: FAMILY MEDICINE CLINIC | Facility: CLINIC | Age: 40
End: 2025-04-14

## 2025-04-14 VITALS
HEART RATE: 60 BPM | SYSTOLIC BLOOD PRESSURE: 98 MMHG | WEIGHT: 218 LBS | OXYGEN SATURATION: 96 % | TEMPERATURE: 98 F | DIASTOLIC BLOOD PRESSURE: 70 MMHG | RESPIRATION RATE: 16 BRPM | BODY MASS INDEX: 40.12 KG/M2 | HEIGHT: 62 IN

## 2025-04-14 DIAGNOSIS — J45.20 MILD INTERMITTENT ASTHMA WITHOUT COMPLICATION: ICD-10-CM

## 2025-04-14 DIAGNOSIS — K59.00 CONSTIPATION, UNSPECIFIED CONSTIPATION TYPE: ICD-10-CM

## 2025-04-14 DIAGNOSIS — K59.00 CONSTIPATION, UNSPECIFIED CONSTIPATION TYPE: Primary | ICD-10-CM

## 2025-04-14 PROCEDURE — 99213 OFFICE O/P EST LOW 20 MIN: CPT | Performed by: NURSE PRACTITIONER

## 2025-04-14 PROCEDURE — 74018 RADEX ABDOMEN 1 VIEW: CPT

## 2025-04-14 RX ORDER — TRAZODONE HYDROCHLORIDE 150 MG/1
TABLET ORAL
COMMUNITY
Start: 2025-04-14

## 2025-04-14 NOTE — PROGRESS NOTES
Name: Vivian You      : 1985      MRN: [id number]  Encounter Provider: GISSELLE Manzano  Encounter Date: 2025   Encounter department: [facility]  :  Assessment & Plan  Constipation, unspecified constipation type  Has experienced previously but this episode is not improving with thypical self treatments   Will check KUB   Will send kayexalate   ED parameters reviewed     Orders:    XR abdomen 1 view kub; Future    Sodium Polystyrene Sulfonate (KAYEXALATE) 15 g/60 mL suspension; Take 120 mL (30 g total) by mouth in the morning    Ambulatory Referral to Gastroenterology; Future    Basic metabolic panel; Future    Mild intermittent asthma without complication  Stable, continue albuterol PRN                   History of Present Illness   Patient is a 38 YO female who  has a past medical history of Anxiety, Asthma, Bipolar 1 disorder (HCC), Depression, IBS (irritable bowel syndrome), Insomnia, Migraine, PTSD (post-traumatic stress disorder), and UTI (urinary tract infection).    She presents for follow up. Reports that she is having difficulty with constipation. Currently has only passed very small, hard stool for last several weeks. Feels very bloated, nauseas, has generalized abdominal pain. Has had constipation in the past easily relieved with OTC laxative, but not working this time.     The following portions of the patient's history were reviewed and updated as appropriate: allergies, current medications, past family history, past medical history, past social history, past surgical history and problem list.      Review of Systems   Constitutional:  Negative for chills and fever.   HENT:  Negative for ear pain and sore throat.    Eyes:  Negative for pain and visual disturbance.   Respiratory:  Negative for cough and shortness of breath.    Cardiovascular:  Negative for chest pain and palpitations.   Gastrointestinal:  Positive for abdominal distention,  "abdominal pain, constipation and nausea. Negative for vomiting.   Genitourinary:  Negative for dysuria and hematuria.   Musculoskeletal:  Negative for arthralgias and back pain.   Skin:  Negative for color change and rash.   Neurological:  Negative for seizures and syncope.   All other systems reviewed and are negative.      Objective   BP 98/70 (BP Location: Right arm, Patient Position: Sitting, Cuff Size: Large)   Pulse 60   Temp 98 °F (36.7 °C) (Temporal)   Resp 16   Ht 5' 2\" (1.575 m)   Wt 98.9 kg (218 lb)   SpO2 96%   BMI 39.87 kg/m²      Physical Exam  Vitals and nursing note reviewed.   Constitutional:       General: She is not in acute distress.     Appearance: She is well-developed.   HENT:      Head: Normocephalic and atraumatic.   Eyes:      Conjunctiva/sclera: Conjunctivae normal.   Cardiovascular:      Rate and Rhythm: Normal rate and regular rhythm.   Pulmonary:      Effort: Pulmonary effort is normal. No respiratory distress.      Breath sounds: Normal breath sounds.   Abdominal:      Palpations: Abdomen is soft.      Tenderness: There is abdominal tenderness in the right upper quadrant, epigastric area and left upper quadrant.   Musculoskeletal:         General: No swelling.      Cervical back: Neck supple.   Skin:     General: Skin is warm and dry.      Capillary Refill: Capillary refill takes less than 2 seconds.   Neurological:      Mental Status: She is alert.   Psychiatric:         Mood and Affect: Mood normal.         "

## 2025-04-16 NOTE — PROGRESS NOTES
Name: Vivian You      : 1985      MRN: [id number]  Encounter Provider: Jenifer Salas MD  Encounter Date: 2025   Encounter department: [facility]  :  Assessment & Plan  Abnormal uterine bleeding  - Patient has attempted medical management; not candidate for estrogen containing therapies, reports significant difficulty with compliance with oral methods and declines depo provera injections   -Given that she has now failed IUD and continues to experience AUB/pelvic pain she is interested in definitive management with surgery   -EMB performed today  -Discussed TLH vs TVH and risks of bleeding, infection, and injury to surrounding structures including bowel, bladder, ureters, blood vessels, and nerves  -Discussed expected recovery with minimally invasive surgery vs extended recovery and in-hospital stay if open procedure were required. We discussed that in the unlikely incidence where any injury occurred at time of surgery a specialist would be called in to assist              History of Present Illness {?Quick Links Encounters * My Last Note * Last Note in Specialty * Snapshot * Since Last Visit * History :45410}  HPI  Vivian You is a 39 y.o. female who presents for follow up for AUB. Vivian has tried several medical options to manage AUB and she is limited by ability to not take estrogen. She has an IUD in place and recently symptoms have been ***. She reports ***.  {History obtained from(Optional):77156}    Review of Systems  {Select to insert medical history sections (Optional):26703}     Objective {?Quick Links Trend Vitals * Enter New Vitals * Results Review * Timeline (Adult) * Labs * Imaging * Cardiology * Procedures * Lung Cancer Screening * Surgical eConsent :87496}  There were no vitals taken for this visit.     Physical Exam    {Administrative / Billing Section (Optional):75874}

## 2025-04-16 NOTE — ASSESSMENT & PLAN NOTE
- Patient has attempted medical management; not candidate for estrogen containing therapies, reports significant difficulty with compliance with oral methods and declines depo provera injections   -Given that she has now failed IUD and continues to experience AUB/pelvic pain she is interested in definitive management with surgery   -EMB performed today  -Discussed TLH vs TVH and risks of bleeding, infection, and injury to surrounding structures including bowel, bladder, ureters, blood vessels, and nerves  -Discussed expected recovery with minimally invasive surgery vs extended recovery and in-hospital stay if open procedure were required. We discussed that in the unlikely incidence where any injury occurred at time of surgery a specialist would be called in to assist

## 2025-04-17 ENCOUNTER — PROCEDURE VISIT (OUTPATIENT)
Dept: OBGYN CLINIC | Facility: CLINIC | Age: 40
End: 2025-04-17

## 2025-04-17 VITALS — WEIGHT: 216.4 LBS | BODY MASS INDEX: 39.58 KG/M2 | SYSTOLIC BLOOD PRESSURE: 100 MMHG | DIASTOLIC BLOOD PRESSURE: 64 MMHG

## 2025-04-17 DIAGNOSIS — N93.9 ABNORMAL UTERINE BLEEDING: Primary | ICD-10-CM

## 2025-04-17 LAB — SL AMB POCT URINE HCG: NEGATIVE

## 2025-04-17 PROCEDURE — 88305 TISSUE EXAM BY PATHOLOGIST: CPT | Performed by: STUDENT IN AN ORGANIZED HEALTH CARE EDUCATION/TRAINING PROGRAM

## 2025-04-18 NOTE — PROGRESS NOTES
"Endometrial biopsy    Date/Time: 4/17/2025 3:30 PM    Performed by: Jenifer Salas MD  Authorized by: Jenifer Salas MD  [place] Protocol:  Procedure performed by: (Dr. Pittman)  Consent: Verbal consent obtained. Written consent obtained.  Risks and benefits: risks, benefits and alternatives were discussed  Consent given by: patient  Time out: Immediately prior to procedure a \"time out\" was called to verify the correct patient, procedure, equipment, support staff and site/side marked as required.  Patient understanding: patient states understanding of the procedure being performed  Patient consent: the patient's understanding of the procedure matches consent given  Procedure consent: procedure consent matches procedure scheduled  Relevant documents: relevant documents present and verified  Test results: test results not available  Site marked: the operative site was not marked  Radiology Images displayed and confirmed. If images not available, report reviewed: imaging studies not available  Required items: required blood products, implants, devices, and special equipment available  Patient identity confirmed: verbally with patient    Indication:     Indications: Other disorder of menstruation and other abnormal bleeding from female genital tract    Pre-procedure:     Negative urine pregnancy test: yes    Procedure:     Procedure: endometrial biopsy with Pipelle      A bivalve speculum was placed in the vagina: yes      Cervix cleaned and prepped: yes      A paracervical block was performed: no      An intracervical block was performed: no      The cervix was dilated: no      Uterus sounded: yes      Uterus sound depth (cm):  7    Specimen collected: specimen collected and sent to pathology      Patient tolerated procedure well with no complications: yes    Findings:     Cervix: normal    Comments:     Procedure comments:  IUD strings visualized pre and post procedure    Patient reports oligomenorrhea " "and that periods are \"normal\" with IUD. Defer discussion of hysterectomy at this time to primary provider Dr. Sharif. Follow up pathology.    Jenifer Salas MD  PGY 2, Obstetrics and Gynecology  4/17/2025  8:11 PM      "

## 2025-04-21 ENCOUNTER — RESULTS FOLLOW-UP (OUTPATIENT)
Dept: FAMILY MEDICINE CLINIC | Facility: CLINIC | Age: 40
End: 2025-04-21

## 2025-04-22 ENCOUNTER — RESULTS FOLLOW-UP (OUTPATIENT)
Dept: OTHER | Facility: HOSPITAL | Age: 40
End: 2025-04-22

## 2025-04-22 PROCEDURE — 88305 TISSUE EXAM BY PATHOLOGIST: CPT | Performed by: STUDENT IN AN ORGANIZED HEALTH CARE EDUCATION/TRAINING PROGRAM

## 2025-05-04 DIAGNOSIS — J45.20 MILD INTERMITTENT ASTHMA WITHOUT COMPLICATION: ICD-10-CM

## 2025-05-04 DIAGNOSIS — R51.9 HEADACHE, UNSPECIFIED: ICD-10-CM

## 2025-05-05 RX ORDER — ALBUTEROL SULFATE 90 UG/1
1 INHALANT RESPIRATORY (INHALATION) AS NEEDED
Qty: 16 G | Refills: 0 | Status: SHIPPED | OUTPATIENT
Start: 2025-05-05

## 2025-05-05 RX ORDER — MAGNESIUM OXIDE 400 MG/1
TABLET ORAL
Qty: 30 TABLET | Refills: 5 | Status: SHIPPED | OUTPATIENT
Start: 2025-05-05

## 2025-05-08 ENCOUNTER — OFFICE VISIT (OUTPATIENT)
Dept: OBGYN CLINIC | Facility: CLINIC | Age: 40
End: 2025-05-08

## 2025-05-08 VITALS
WEIGHT: 217.2 LBS | BODY MASS INDEX: 39.97 KG/M2 | HEIGHT: 62 IN | DIASTOLIC BLOOD PRESSURE: 72 MMHG | SYSTOLIC BLOOD PRESSURE: 102 MMHG

## 2025-05-08 DIAGNOSIS — N93.9 ABNORMAL UTERINE BLEEDING: Primary | ICD-10-CM

## 2025-05-08 DIAGNOSIS — Z01.818 PREOP TESTING: ICD-10-CM

## 2025-05-08 NOTE — PROGRESS NOTES
Name: Vivian You      : 1985      MRN: [id number]  Encounter Provider: Julieta Sharif MD  Encounter Date: 2025   Encounter department: [facility]  :  Assessment & Plan  Abnormal uterine bleeding  Endorses a long history of menorrhagia not controlled by OCPs  IUD placed 2023 with initial resolution of bleeding and now with continued AUB  TVUS 3/2024 secondary to pelvic pain showed IUD in place, repeat TVUS 2024 again showed IUD in place   CBC/TSH wnl, prolactin elevated x 2 however head MRI with no signs of prolactinoma, thought to be secondary to medications   Given history of migraines w/ aura, not candidate for estrogen containing therapies, reports significant difficulty with compliance with oral methods and declines depo provera injections   S/p trial of 600 mg Q6H while bleeding with no improvement of symptoms   EMB completed 25 - Atrophic endometrium with decidualized stroma, consistent with progestin therapy effect. - Negative for endometrioid intraepithelial neoplasia (EIN) and malignancy.   At this time, Vivian wishes to proceed with surgical management - we discussed management with ablation vs hysterectomy, she wishes to proceed with hysterectomy at this time. We reviewed various routes of hysterectomies including laparoscopic and possible robotic approach. We reviewed risks of procedure including bleeding, infection, injury to surrounding organs including bowel, bladder and ureters. We reviewed that her pelvic pain may not be completed resolved following hysterectomy. We reviewed risk of conversion to an open procedure. We reviewed expected recovery time and postoperative follow up schedule.   We reviewed preop testing with CBC/CMP (A1c completed 2024 and wnl) and EKG              History of Present Illness   HPI  Vivain You is a 39 y.o. female who presents to discuss follow up of AUB.     Vivian has a history of AUB for which she has  "been followed in office. She had an IUD placed 5/8/2023 for control of menstruation. She reports that prior to her IUD her cycles were \"horrible\". Since IUD placement, Vivian reports that her cycles are irregular and that she is unable to predict when she is going to bleed. She notes that aside from spotting, she continues to have cycles that are heavy lasting 4-5 days and accompanied by significant cramping. She reports that this cramping is very bothersome and not relieved by tylenol/motrin. She reports that this cramping interferes with her daily life and prevents her from being able to go to work.     Vivian has a history of cholecystectomy and 1 vaginal delivery. She denies any other history of abdominal surgery.       Review of Systems   Constitutional:  Negative for chills and fever.   HENT:  Negative for ear pain and sore throat.    Eyes:  Negative for pain and visual disturbance.   Respiratory:  Negative for cough and shortness of breath.    Cardiovascular:  Negative for chest pain and palpitations.   Gastrointestinal:  Negative for abdominal pain and vomiting.   Genitourinary:  Positive for vaginal bleeding. Negative for dysuria and hematuria.   Musculoskeletal:  Negative for arthralgias and back pain.   Skin:  Negative for color change and rash.   Neurological:  Negative for seizures and syncope.   All other systems reviewed and are negative.         Objective   /72 (Patient Position: Sitting, Cuff Size: Standard)   Ht 5' 2\" (1.575 m)   Wt 98.5 kg (217 lb 3.2 oz)   LMP 04/17/2025 (Approximate)   BMI 39.73 kg/m²      Physical Exam  Constitutional:       General: She is not in acute distress.     Appearance: Normal appearance.   HENT:      Head: Normocephalic and atraumatic.   Cardiovascular:      Rate and Rhythm: Normal rate.   Pulmonary:      Effort: Pulmonary effort is normal.   Abdominal:      Palpations: Abdomen is soft.   Skin:     General: Skin is warm and dry.   Neurological:      General: " No focal deficit present.      Mental Status: She is alert.   Psychiatric:         Mood and Affect: Mood normal.         Julieta Sharif MD  Obstetrics & Gynecology PGY-4

## 2025-05-12 DIAGNOSIS — Z72.0 TOBACCO USE: Primary | ICD-10-CM

## 2025-05-12 RX ORDER — NICOTINE 21 MG/24HR
1 PATCH, TRANSDERMAL 24 HOURS TRANSDERMAL EVERY 24 HOURS
Qty: 28 PATCH | Refills: 3 | Status: SHIPPED | OUTPATIENT
Start: 2025-05-12

## 2025-05-12 NOTE — ASSESSMENT & PLAN NOTE
Endorses a long history of menorrhagia not controlled by OCPs  IUD placed 5/2023 with initial resolution of bleeding and now with continued AUB  TVUS 3/2024 secondary to pelvic pain showed IUD in place, repeat TVUS 11/2024 again showed IUD in place   CBC/TSH wnl, prolactin elevated x 2 however head MRI with no signs of prolactinoma, thought to be secondary to medications   Given history of migraines w/ aura, not candidate for estrogen containing therapies, reports significant difficulty with compliance with oral methods and declines depo provera injections   S/p trial of 600 mg Q6H while bleeding with no improvement of symptoms   EMB completed 4/17/25 - Atrophic endometrium with decidualized stroma, consistent with progestin therapy effect. - Negative for endometrioid intraepithelial neoplasia (EIN) and malignancy.   At this time, Vivian wishes to proceed with surgical management - we discussed management with ablation vs hysterectomy, she wishes to proceed with hysterectomy at this time. We reviewed various routes of hysterectomies including laparoscopic and possible robotic approach. We reviewed risks of procedure including bleeding, infection, injury to surrounding organs including bowel, bladder and ureters. We reviewed that her pelvic pain may not be completed resolved following hysterectomy. We reviewed risk of conversion to an open procedure. We reviewed expected recovery time and postoperative follow up schedule.   We reviewed preop testing with CBC/CMP (A1c completed 12/2024 and wnl) and EKG

## 2025-05-16 DIAGNOSIS — G43.709 CHRONIC MIGRAINE WITHOUT AURA WITHOUT STATUS MIGRAINOSUS, NOT INTRACTABLE: ICD-10-CM

## 2025-05-16 DIAGNOSIS — E23.6 EMPTY SELLA (HCC): ICD-10-CM

## 2025-05-16 NOTE — TELEPHONE ENCOUNTER
Medication: Maxalt    Dose/Frequency: 10mg tab PRN    Quantity:     Pharmacy: [facility]    Office:   [] PCP/Provider -   [x] Speciality/Provider - Chasity Nina    Does the patient have enough for 3 days?   [x] Yes   [] No - Send as HP to POD         Medication: Diamox    Dose/Frequency: 250mg tab / 2x daily    Quantity: 30 days    Pharmacy: [facility]    Office:   [] PCP/Provider -   [x] Speciality/Provider - Chasity Nina    Does the patient have enough for 3 days?   [x] Yes   [] No - Send as HP to POD

## 2025-05-19 ENCOUNTER — TELEPHONE (OUTPATIENT)
Dept: OBGYN CLINIC | Facility: MEDICAL CENTER | Age: 40
End: 2025-05-19

## 2025-05-19 ENCOUNTER — PREP FOR PROCEDURE (OUTPATIENT)
Dept: OBGYN CLINIC | Facility: CLINIC | Age: 40
End: 2025-05-19

## 2025-05-19 DIAGNOSIS — N93.9 ABNORMAL UTERINE BLEEDING (AUB): Primary | ICD-10-CM

## 2025-05-19 DIAGNOSIS — Z01.818 PRE-OP TESTING: ICD-10-CM

## 2025-05-19 RX ORDER — RIZATRIPTAN BENZOATE 10 MG/1
TABLET, ORALLY DISINTEGRATING ORAL
Qty: 9 TABLET | Refills: 0 | Status: SHIPPED | OUTPATIENT
Start: 2025-05-19

## 2025-05-19 RX ORDER — ACETAZOLAMIDE 250 MG/1
250 TABLET ORAL 2 TIMES DAILY
Qty: 60 TABLET | Refills: 0 | Status: SHIPPED | OUTPATIENT
Start: 2025-05-19

## 2025-05-19 NOTE — TELEPHONE ENCOUNTER
----- Message from Julieta Sharif MD sent at 2025 11:43 AM EDT -----  [facility] GYN Department  Surgery Scheduling Sheet    Patient Name: Vivian You  : 1985    Provider: Dr. Jimenez /Julieta Sharif MD     Needed: no; Language: N/A    Procedure: exam under anesthesia, total laparoscopic hysterectomy, cystoscopy, and bilateral salpingectomy vs RA-TLH, BS (pending OR availability)     Diagnosis: AUB    Special Needs or Equipment: none    Anesthesia: General anesthesia    Length of stay: outpatient  Does patient have comorbid conditions that will require close perioperative monitoring prior to safe discharge: no    The patient has comorbid conditions that will require close perioperative monitoring prior to safe discharge, including N/A.   This may require acute care beyond the usual and routine recovery period. As such, inpatient admission post-operatively is expected and appropriate, and anticipated hospital length of stay will be >2 midnights.    Pre-Admission Testing Needed: yes   Labs that should be ordered: cbc, type and screen, cmp, urine pregnancy test, and EKG    Order PAT that is recommended in prep for procedure?: Yes    Medical Clearance Needed: no;    MA Form Signed (tubals/hysterectomy): No - can be signed in preop     Surgical Drink Given: no     How many days out of work: 6 week(s)     How many days no drivin week(s)       Is pre op appt needed?  yes  Interval for post op appt: 2 week(s)       For Surgical Scheduler:     Surgery Scheduled On: 25  [place]: [facility]    Pre-op Appt: 2025  Post op Appt: 9/10/25  Consult/Medical clearance appt: n/a

## 2025-05-19 NOTE — TELEPHONE ENCOUNTER
Called and spoke with pt.   Surgery is now scheduled with Dr. Jimenez in the [place] OR.     Please see the [facility] OB GYN Department Surgery Scheduling Sheet in this encounter for further information.

## 2025-06-02 DIAGNOSIS — F41.9 ANXIETY: ICD-10-CM

## 2025-06-02 DIAGNOSIS — F32.A DEPRESSION, UNSPECIFIED DEPRESSION TYPE: ICD-10-CM

## 2025-06-02 RX ORDER — PAROXETINE 20 MG/1
20 TABLET, FILM COATED ORAL DAILY
Qty: 30 TABLET | Refills: 2 | Status: SHIPPED | OUTPATIENT
Start: 2025-06-02

## 2025-06-10 ENCOUNTER — PATIENT MESSAGE (OUTPATIENT)
Dept: NEUROLOGY | Facility: CLINIC | Age: 40
End: 2025-06-10

## 2025-06-10 ENCOUNTER — OFFICE VISIT (OUTPATIENT)
Dept: NEUROLOGY | Facility: CLINIC | Age: 40
End: 2025-06-10
Payer: MEDICARE

## 2025-06-10 VITALS
WEIGHT: 217 LBS | SYSTOLIC BLOOD PRESSURE: 118 MMHG | BODY MASS INDEX: 39.93 KG/M2 | DIASTOLIC BLOOD PRESSURE: 85 MMHG | HEIGHT: 62 IN | HEART RATE: 87 BPM

## 2025-06-10 DIAGNOSIS — E23.6 EMPTY SELLA (HCC): ICD-10-CM

## 2025-06-10 DIAGNOSIS — G43.709 CHRONIC MIGRAINE WITHOUT AURA WITHOUT STATUS MIGRAINOSUS, NOT INTRACTABLE: Primary | ICD-10-CM

## 2025-06-10 PROCEDURE — 96372 THER/PROPH/DIAG INJ SC/IM: CPT | Performed by: PSYCHIATRY & NEUROLOGY

## 2025-06-10 PROCEDURE — 99214 OFFICE O/P EST MOD 30 MIN: CPT | Performed by: PSYCHIATRY & NEUROLOGY

## 2025-06-10 RX ORDER — KETOROLAC TROMETHAMINE 10 MG/1
10 TABLET, FILM COATED ORAL EVERY 6 HOURS PRN
Qty: 10 TABLET | Refills: 5 | Status: SHIPPED | OUTPATIENT
Start: 2025-06-10

## 2025-06-10 RX ORDER — KETOROLAC TROMETHAMINE 30 MG/ML
60 INJECTION, SOLUTION INTRAMUSCULAR; INTRAVENOUS ONCE
Status: COMPLETED | OUTPATIENT
Start: 2025-06-10 | End: 2025-06-10

## 2025-06-10 RX ORDER — TOPIRAMATE 25 MG/1
TABLET, FILM COATED ORAL
Qty: 120 TABLET | Refills: 2 | Status: SHIPPED | OUTPATIENT
Start: 2025-06-10

## 2025-06-10 RX ADMIN — KETOROLAC TROMETHAMINE 60 MG: 30 INJECTION, SOLUTION INTRAMUSCULAR; INTRAVENOUS at 09:05

## 2025-06-10 NOTE — PROGRESS NOTES
"Name: Vivian You      : 1985      MRN: [id number]  Encounter Provider: Chasity Nina PA-C  Encounter Date: 6/10/2025   [facility]  :  Assessment & Plan  Chronic migraine without aura without status migrainosus, not intractable    The patient has ongoing migraine headaches which did not improve with a low-dose of Diamox, and some side effects to Diamox as noted below.  Will transition from Diamox to Topamax at this time.  I do suspect at least mild increased intracranial pressure.  No papilledema on recent eye exam in May at [facility] sight thankfully.  On MRV in March she was noted to have \"stenosis in the lateral segments of the bilateral transverse dural venous sinuses.\"  This can possibly be associated with increased intracranial pressure, along with the partially empty sella on recent brain MRI.  I would like to order a lumbar puncture at this time to rule out increased intracranial pressure diagnostically.  The patient is agreeable.  May need to refer her to neurosurgery depending on the outcome, and whether or not Topamax is helpful.    Continue rizatriptan plus or minus Toradol at the onset of a migraine.    CONSIDER BOTOX.  Pt agreeable to botox.    Orders:    topiramate (TOPAMAX) 25 mg tablet; 1 tab qhs x 5 days, then 2 tabs qhs x 5 days, then 3 tabs qhs x 5 days, then 4 tabs qhs    ketorolac (TORADOL) 10 mg tablet; Take 1 tablet (10 mg total) by mouth every 6 (six) hours as needed (migraine) Max 2-3 per week.    ketorolac (TORADOL) 60 mg/2 mL IM injection 60 mg    Nursing communication Encourage po fluids and aim for 500 cc of H20 over 1 hour before procedure.; Standing    FL OUT-patient lumbar puncture; Future    Platelet count - Signed and Held; Standing    Protime-INR - Signed and Held; Standing    Red Top - Signed and Held; Standing    CSF white cell count with differential; Standing    Glucose CSF; Standing    Protein CSF; Standing    RBC " "count,CSF; Standing    Empty sella (HCC)           There are no Patient Instructions on file for this visit.     The patient should not hesitate to call me prior to her follow up with any questions or concerns.    History of Present Illness   HPI migraine follow up, says she is still getting the migraines, she was out last week for 2 days, says Diamox is effecting her taste buds. Says she got about 7-8 migraines last month, 3 made her vomit and ill. Rizatriptan dulls the migraines, wakes up with them and some keep her up all night.    Saw [facility] 5/2/25- no papilledema    Brain mri pituitary w/wo 1/2/25:  \"1. No pituitary lesion identified to suggest a microadenoma or macroadenoma.  2. Partially empty sella with apparent stenoses involving the bilateral distal transverse dural venous sinuses, which can be seen in the setting of idiopathic intracranial hypertension. Correlation with the patient's clinical history, and ophthalmologic funduscopic findings is recommended, and if needed, consider further evaluation with lumbar puncture and opening CSF pressure measurement.  3. No acute infarct. Scattered T2/FLAIR hyperintensities involving the white matter of both cerebral hemispheres, nonspecific, but which likely represent chronic microangiopathic changes, or the sequela of migraines.\"        Migraines/headaches:     Onset: For started at 18 yo (2005) when she was pregnant with her son, worse over the past 6 months now.  She also has a finding of high prolactin in December, which is thought to possibly be from risperidone.  She has been on risperidone for about 2 years.  She is seeing her psychiatrist today to discuss this.  No pituitary adenoma per brain MRI.  Head injury: None  Current pain: 4/10 6/10/2025  Reaches pain level at worst: 10/10  Frequency:   6/10/25- 2 per week about, meds help except fro diamox which caused taste changes, still taking it but does not think it helps  Consult/ last visit- 2/week " and 4/month, or more  She typically wakes up with them, but not every morning, and as the day goes on it gets worse.  Medications are not helping.  Duration: 2 to 3 hours depending on whether medication works, can last for the entire day  Location: Bilateral temples, holoacranial, sharp in the back of the head and sometimes radiate into the neck  Quality: Throbbing, pressure, pulsing, achy, tight band, shooting down the back of the neck  She typically misses 3 workdays on average per month due to a headache.  Associated with: Nausea, vomiting, sore/stiff neck, concentration problems, decreased appetite, prefers a quiet and dark room, lightheaded/dizzy, light and sound sensitivity    Triggers: Stress, coughing, sunlight, menstruation, weather changes, chocolate, tomato    Aura/ warning: None, although sometimes she does get blurry vision but not a warning sign  -- blurry vision from either eye and then the HA comes on  She does not always have blurry vision.     Medications tried:  Prevention-  Magnesium oxide  B2  Diamox- taste changes/ most food tastes stale/ bad     Abortive-  Imitrex- does not really help, helps a little  Tylenol  Ibuprofen  Rizatriptan-helps a little bit  Toradol-helps       Other non-medication therapies or treatments- none     Neck pain and description: sharp pain down the head and into the neck b/l with a migraine  Sleep concerns: sometimes but overall sleeps okay  Family planning: mirena     Family hx of migraines: brother  Family hx of cerebral aneurysms: no     Lifestyle:  Hydration- 40-60 oz daily  Caffeine- soda at lunch, that is it and trying to cut back  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   HENT: Negative.  Negative for hearing loss, tinnitus, trouble swallowing and voice change.    Eyes: Negative.  Negative for photophobia, pain and visual disturbance.   Respiratory: Negative.   Negative for shortness of breath.    Cardiovascular: Negative.  Negative for palpitations.   Gastrointestinal:  Positive for vomiting. Negative for nausea.   Endocrine: Negative.  Negative for cold intolerance.   Genitourinary: Negative.  Negative for dysuria, frequency and urgency.   Musculoskeletal:  Negative for back pain, gait problem, myalgias, neck pain and neck stiffness.   Skin: Negative.  Negative for rash.   Allergic/Immunologic: Negative.    Neurological:  Positive for headaches. Negative for dizziness, tremors, seizures, syncope, facial asymmetry, speech difficulty, weakness, light-headedness and numbness.   Hematological: Negative.  Does not bruise/bleed easily.   Psychiatric/Behavioral: Negative.  Negative for confusion, hallucinations and sleep disturbance.     I have personally reviewed the MA's review of systems and made changes as necessary.    Pertinent Medical History           Medical History Reviewed by provider this encounter:  Tobacco  Allergies  Meds  Problems  Med Hx  Surg Hx  Fam Hx     .  Past Medical History   Past Medical History[1]  Past Surgical History[2]  Family History[3]   reports that she has been smoking cigarettes. She started smoking about 26 years ago. She has a 6.6 pack-year smoking history. She has been exposed to tobacco smoke. She has never used smokeless tobacco. She reports current drug use. Drug: Marijuana. She reports that she does not drink alcohol.  Current Outpatient Medications   Medication Instructions    acetaminophen (TYLENOL) 650 mg, Oral, Every 8 hours PRN    albuterol (PROVENTIL HFA,VENTOLIN HFA) 90 mcg/act inhaler 1 puff, Inhalation, As needed, INHALE 1 PUFF EVERY 4 HOURS AS NEEDED FOR WHEEZING.    ARIPiprazole (ABILIFY) 5 mg tablet     clonazePAM (KlonoPIN) 1 mg tablet 1 tablet, Daily    ibuprofen (MOTRIN) 600 mg, Oral, Every 6 hours PRN, During menses    ketorolac (TORADOL) 10 mg, Oral, Every 6 hours PRN, Max 2-3 per week.    magnesium oxide  "(MAG-OX) 400 mg tablet TAKE 1 TABLET (400 MG TOTAL) BY MOUTH IN THE MORNING    Magnesium 400 mg, Oral, Daily    methylPREDNISolone 4 MG tablet therapy pack Use as directed on package    Mirena, 52 MG, 20 MCG/DAY IUD No dose, route, or frequency recorded.    Multiple Vitamins-Minerals (WOMENS 50+ MULTI VITAMIN PO) Take by mouth    nicotine (NICODERM CQ) 14 mg/24hr TD 24 hr patch 1 patch, Transdermal, Every 24 hours    ondansetron (ZOFRAN-ODT) 4 mg, Oral, Every 6 hours PRN    OXcarbazepine (TRILEPTAL) 300 mg tablet 1 tablet, 2 times daily    PARoxetine (PAXIL) 20 mg, Oral, Daily    prochlorperazine (COMPAZINE) 10 mg, Oral, Every 6 hours PRN    Riboflavin 400 mg, Oral, Daily    rizatriptan (MAXALT-MLT) 10 mg disintegrating tablet 1 tab at migraine onset, repeat after 2 hours if needed; Max 2 per day and 3 per week.    Sodium Polystyrene Sulfonate (KAYEXALATE) 30 g, Oral, Daily    tiZANidine (ZANAFLEX) 2 mg, Oral, Every 8 hours PRN    topiramate (TOPAMAX) 25 mg tablet 1 tab qhs x 5 days, then 2 tabs qhs x 5 days, then 3 tabs qhs x 5 days, then 4 tabs qhs    traZODone (DESYREL) 150 mg tablet    Allergies[4]   Medications Ordered Prior to Encounter[5]   Social History[6]     Objective   /85 (BP Location: Left arm, Patient Position: Sitting, Cuff Size: Standard)   Pulse 87   Ht 5' 2\" (1.575 m)   Wt 98.4 kg (217 lb)   BMI 39.69 kg/m²     Physical Exam  Neurological Exam  On neurologic exam, the patient is alert and oriented to time and place. Speech is fluent and articulate, and the patient follows commands appropriately. Judgment and affect appear normal. Pupils are equally round and reactive to light and extraocular muscles are intact without nystagmus. Face is symmetric, and tongue, uvula, and palate are midline. Hearing is intact. Motor examination reveals intact strength throughout. Neck flexors 5/5, mild TTP suboccipital regions bilaterally.  Normal gait is steady.  Reflexes are 2+ in all 4 " extremities.    Radiology Results Review: I have reviewed radiology reports from 3/7/2025 including: MRV head.    Administrative Statements   I have spent a total time of 30 minutes in caring for this patient on the day of the visit/encounter including Diagnostic results, Prognosis, Risks and benefits of tx options, Instructions for management, Patient and family education, Importance of tx compliance, Risk factor reductions, Impressions, Counseling / Coordination of care, Documenting in the medical record, Reviewing/placing orders in the medical record (including tests, medications, and/or procedures), and Obtaining or reviewing history  .         [1]   Past Medical History:  Diagnosis Date    Anxiety     Asthma     Bipolar 1 disorder (HCC)     Depression     IBS (irritable bowel syndrome)     diarrhea    Insomnia     Migraine     PTSD (post-traumatic stress disorder)     UTI (urinary tract infection)    [2]   Past Surgical History:  Procedure Laterality Date    ANKLE HARDWARE REMOVAL Right     ANKLE SURGERY Right     CHOLECYSTECTOMY      FOOT SURGERY Left     with hardware    MOUTH SURGERY  2011    metal placed from broken jaw    ORIF MANDIBULAR FRACTURE Bilateral 06/03/2019    Procedure: MANDIBLE CARROLL REMOVAL AND HARWARE REMOVAL FROM LEFT MANDIBLE ALVEOLAR RIDGE;  Surgeon: Lucretia Sierra DMD;  Location: [facility];  Service: Maxillofacial    OTHER SURGICAL HISTORY      unlisted craniofacial and maxillofacial procedure    WI COLPOSCOPY CERVIX VAG LOOP ELTRD BX CERVIX N/A 3/17/2023    Procedure: BIOPSY LEEP CERVIX;  Surgeon: Yardlie Toussaint-Foster, DO;  Location: [facility];  Service: Gynecology    WI CYSTOURETHROSCOPY N/A 10/24/2023    Procedure: CYSTO; INJECTION BULKING AGENT URETHRAL;  Surgeon: Jenny Wagner MD;  Location: [facility];  Service: Gynecology    US GUIDED BREAST BIOPSY RIGHT COMPLETE Right 3/29/2023   [3]   Family History  Problem Relation Name Age of Onset    Lung cancer Mother Denise You      Brain cancer Mother Denise Day     Bone cancer Mother Denise Day     Cancer Mother Denise Day     Lung cancer Father      Bone cancer Father      Lung cancer Paternal Grandfather      Liver cancer Maternal Uncle      Lung cancer Paternal Uncle      No Known Problems Paternal Aunt      No Known Problems Paternal Aunt     [4]   Allergies  Allergen Reactions    Codeine GI Intolerance    Fentanyl      Flushing    Penicillins Headache, Other (See Comments) and Vomiting     Severe vomitting      Naproxen Rash    Wellbutrin [Bupropion] Rash   [5]   Current Outpatient Medications on File Prior to Visit   Medication Sig Dispense Refill    acetaminophen (TYLENOL) 650 mg CR tablet Take 1 tablet (650 mg total) by mouth every 8 (eight) hours as needed for mild pain 30 tablet 0    albuterol (PROVENTIL HFA,VENTOLIN HFA) 90 mcg/act inhaler Inhale 1 puff if needed for wheezing INHALE 1 PUFF EVERY 4 HOURS AS NEEDED FOR WHEEZING. 16 g 0    ARIPiprazole (ABILIFY) 5 mg tablet       clonazePAM (KlonoPIN) 1 mg tablet Take 1 tablet by mouth in the morning      ibuprofen (MOTRIN) 600 mg tablet Take 1 tablet (600 mg total) by mouth every 6 (six) hours as needed for mild pain for up to 5 days During menses 20 tablet 0    Magnesium 400 MG TABS Take 1 tablet (400 mg total) by mouth in the morning 90 tablet 1    magnesium oxide (MAG-OX) 400 mg tablet TAKE 1 TABLET (400 MG TOTAL) BY MOUTH IN THE MORNING 30 tablet 5    methylPREDNISolone 4 MG tablet therapy pack Use as directed on package 21 each 0    Mirena, 52 MG, 20 MCG/DAY IUD       Multiple Vitamins-Minerals (WOMENS 50+ MULTI VITAMIN PO) Take by mouth      nicotine (NICODERM CQ) 14 mg/24hr TD 24 hr patch Place 1 patch on the skin over 24 hours every 24 hours 28 patch 3    ondansetron (ZOFRAN-ODT) 4 mg disintegrating tablet Take 1 tablet (4 mg total) by mouth every 6 (six) hours as needed for nausea or vomiting 12 tablet 0    OXcarbazepine (TRILEPTAL) 300 mg tablet Take 1 tablet by mouth in the  morning and 1 tablet before bedtime.      PARoxetine (PAXIL) 20 mg tablet TAKE 1 TABLET BY MOUTH EVERY DAY 30 tablet 2    prochlorperazine (COMPAZINE) 10 mg tablet Take 1 tablet (10 mg total) by mouth every 6 (six) hours as needed for nausea or vomiting 30 tablet 0    Riboflavin 100 MG TABS Take 4 tablets (400 mg total) by mouth in the morning 120 tablet 6    rizatriptan (MAXALT-MLT) 10 mg disintegrating tablet 1 tab at migraine onset, repeat after 2 hours if needed; Max 2 per day and 3 per week. 9 tablet 0    Sodium Polystyrene Sulfonate (KAYEXALATE) 15 g/60 mL suspension Take 120 mL (30 g total) by mouth in the morning 360 mL 0    tiZANidine (ZANAFLEX) 2 mg tablet Take 1 tablet (2 mg total) by mouth every 8 (eight) hours as needed for muscle spasms 40 tablet 0    traZODone (DESYREL) 150 mg tablet       [DISCONTINUED] acetaZOLAMIDE (DIAMOX) 250 mg tablet Take 1 tablet (250 mg total) by mouth 2 (two) times a day 60 tablet 0     No current facility-administered medications on file prior to visit.   [6]   Social History  Tobacco Use    Smoking status: Every Day     Current packs/day: 0.25     Average packs/day: 0.3 packs/day for 26.4 years (6.6 ttl pk-yrs)     Types: Cigarettes     Start date: 1999     Passive exposure: Current    Smokeless tobacco: Never    Tobacco comments:     2-3 cigarettes a day last smoked 10.23.23   Vaping Use    Vaping status: Never Used   Substance and Sexual Activity    Alcohol use: Never    Drug use: Yes     Types: Marijuana     Comment: medicated marijuana    Sexual activity: Yes     Partners: Male     Birth control/protection: I.U.D.

## 2025-06-10 NOTE — ASSESSMENT & PLAN NOTE
"  The patient has ongoing migraine headaches which did not improve with a low-dose of Diamox, and some side effects to Diamox as noted below.  Will transition from Diamox to Topamax at this time.  I do suspect at least mild increased intracranial pressure.  No papilledema on recent eye exam in May [facility].  On MRV in March she was noted to have \"stenosis in the lateral segments of the bilateral transverse dural venous sinuses.\"  This can possibly be associated with increased intracranial pressure, along with the partially empty sella on recent brain MRI.  I would like to order a lumbar puncture at this time to rule out increased intracranial pressure diagnostically.  The patient is agreeable.  May need to refer her to neurosurgery depending on the outcome, and whether or not Topamax is helpful.    Continue rizatriptan plus or minus Toradol at the onset of a migraine.    CONSIDER BOTOX.  Pt agreeable to botox.    Orders:    topiramate (TOPAMAX) 25 mg tablet; 1 tab qhs x 5 days, then 2 tabs qhs x 5 days, then 3 tabs qhs x 5 days, then 4 tabs qhs    ketorolac (TORADOL) 10 mg tablet; Take 1 tablet (10 mg total) by mouth every 6 (six) hours as needed (migraine) Max 2-3 per week.    ketorolac (TORADOL) 60 mg/2 mL IM injection 60 mg    Nursing communication Encourage po fluids and aim for 500 cc of H20 over 1 hour before procedure.; Standing    FL OUT-patient lumbar puncture; Future    Platelet count - Signed and Held; Standing    Protime-INR - Signed and Held; Standing    Red Top - Signed and Held; Standing    CSF white cell count with differential; Standing    Glucose CSF; Standing    Protein CSF; Standing    RBC count,CSF; Standing    "

## 2025-06-11 ENCOUNTER — TELEPHONE (OUTPATIENT)
Dept: FAMILY MEDICINE CLINIC | Facility: CLINIC | Age: 40
End: 2025-06-11

## 2025-06-11 NOTE — TELEPHONE ENCOUNTER
PCP SIGNATURE NEEDED FOR Preventive Measures of [place]  FORM RECEIVED VIA FAX AND PLACED IN PCP FOLDER TO BE DELIVERED AT ASSIGNED TIMES.    Comments: Coordination of Care Patient Medication Has Been Changed.    Date/Time/Duration: 6/9/2025 1:49 PM to 1:50 PM - 1

## 2025-06-20 ENCOUNTER — TELEPHONE (OUTPATIENT)
Dept: NEUROLOGY | Facility: CLINIC | Age: 40
End: 2025-06-20

## 2025-06-20 ENCOUNTER — TELEMEDICINE (OUTPATIENT)
Dept: FAMILY MEDICINE CLINIC | Facility: CLINIC | Age: 40
End: 2025-06-20

## 2025-06-20 DIAGNOSIS — R05.1 ACUTE COUGH: Primary | ICD-10-CM

## 2025-06-20 PROCEDURE — 99213 OFFICE O/P EST LOW 20 MIN: CPT

## 2025-06-20 RX ORDER — AZITHROMYCIN 250 MG/1
TABLET, FILM COATED ORAL
Qty: 6 TABLET | Refills: 0 | Status: SHIPPED | OUTPATIENT
Start: 2025-06-20 | End: 2025-06-25

## 2025-06-20 RX ORDER — BENZONATATE 100 MG/1
100 CAPSULE ORAL 3 TIMES DAILY PRN
Qty: 20 CAPSULE | Refills: 0 | Status: SHIPPED | OUTPATIENT
Start: 2025-06-20

## 2025-06-20 NOTE — PROGRESS NOTES
Virtual Regular Visit  Name: Vivian You      : 1985      MRN: [id number]  Encounter Provider: GISSELLE Rea  Encounter Date: 2025   Encounter department: [facility]  :  Assessment & Plan  Acute cough  - Will treat empirical for pneumonia (CAP)  - Encourage hydration, rest, hot tea with honey/ginger, and infection control  - ED precaution discussed   - F/U in 1 week with PCP   - Pt verbalized agreement with plan    - Work excuse provided     Orders:    azithromycin (Zithromax) 250 mg tablet; Take 2 tablets (500 mg total) by mouth daily for 1 day, THEN 1 tablet (250 mg total) daily for 4 days.    benzonatate (TESSALON PERLES) 100 mg capsule; Take 1 capsule (100 mg total) by mouth 3 (three) times a day as needed for cough        History of Present Illness     Patient is a 39 y.o. female whom  has a past medical history of Anxiety, Asthma, Bipolar 1 disorder (HCC), Depression, IBS (irritable bowel syndrome), Insomnia, Migraine, PTSD (post-traumatic stress disorder), and UTI (urinary tract infection). who is seen today in office for productive cough. Pt reports that cough started a week ago but the past few days, has developed greenish/yellowish sputum and fever. Tried OTC cough relieve with no improvement. Uses her inhaler but coughing gets worse.         Review of Systems   Constitutional:  Positive for chills, fatigue and fever.   HENT: Negative.     Respiratory:  Positive for cough, shortness of breath and wheezing. Negative for apnea.    Cardiovascular:  Negative for chest pain and palpitations.   Gastrointestinal: Negative.    Genitourinary: Negative.    Musculoskeletal: Negative.    Skin: Negative.    Neurological: Negative.    Psychiatric/Behavioral: Negative.         Objective   There were no vitals taken for this visit.    Physical Exam  Constitutional:       General: She is not in acute distress.     Appearance: She is not ill-appearing.   HENT:      Nose:  Congestion present.     Cardiovascular:      Pulses: Normal pulses.   Pulmonary:      Effort: Pulmonary effort is normal. No respiratory distress.     Skin:     General: Skin is warm.     Neurological:      General: No focal deficit present.      Mental Status: She is alert and oriented to person, place, and time. Mental status is at baseline.     Psychiatric:         Mood and Affect: Mood normal.         Thought Content: Thought content normal.         Judgment: Judgment normal.         Administrative Statements   Encounter provider GISSELLE Rea    The Patient is located at Home and in the following state in which I hold an active license [place].    The patient was identified by name and date of birth. Vivian PLATA Kenyatta was informed that this is a telemedicine visit and that the visit is being conducted through the Epic Embedded platform. She agrees to proceed..  My office door was closed. No one else was in the room.  She acknowledged consent and understanding of privacy and security of the video platform. The patient has agreed to participate and understands they can discontinue the visit at any time.    I have spent a total time of 20 minutes in caring for this patient on the day of the visit/encounter including Prognosis, Risks and benefits of tx options, Instructions for management, Patient and family education, Importance of tx compliance, Risk factor reductions, Impressions, Counseling / Coordination of care, Documenting in the medical record, Reviewing/placing orders in the medical record (including tests, medications, and/or procedures), Obtaining or reviewing history  , and Communicating with other healthcare professionals , not including the time spent for establishing the audio/video connection.

## 2025-06-20 NOTE — LETTER
June 20, 2025     Patient: Vivian You  YOB: 1985  Date of Visit: 6/20/2025      To Whom it May Concern:    Vivian You is under my professional care. Vivian was seen in my office on 6/20/2025. Vivian may return to work on 6/23/24.    If you have any questions or concerns, please don't hesitate to call.         Sincerely,          SayGISSELLE Hargrove        CC: No Recipients

## 2025-06-20 NOTE — TELEPHONE ENCOUNTER
----- Message from Chasity Nina PA-C sent at 6/10/2025  9:27 AM EDT -----  I ended up ordering a LP for the pt, can you please contact her and help with scheduling?  Thanks!

## 2025-06-23 ENCOUNTER — APPOINTMENT (OUTPATIENT)
Dept: LAB | Facility: HOSPITAL | Age: 40
End: 2025-06-23
Payer: MEDICARE

## 2025-06-23 DIAGNOSIS — Z01.818 PREOP TESTING: ICD-10-CM

## 2025-06-23 DIAGNOSIS — G43.709 CHRONIC MIGRAINE WITHOUT AURA WITHOUT STATUS MIGRAINOSUS, NOT INTRACTABLE: ICD-10-CM

## 2025-06-23 DIAGNOSIS — Z01.818 PRE-OP TESTING: ICD-10-CM

## 2025-06-23 DIAGNOSIS — K59.00 CONSTIPATION, UNSPECIFIED CONSTIPATION TYPE: ICD-10-CM

## 2025-06-23 LAB
25(OH)D3 SERPL-MCNC: 23.3 NG/ML (ref 30–100)
ALBUMIN SERPL BCG-MCNC: 4.3 G/DL (ref 3.5–5)
ALP SERPL-CCNC: 73 U/L (ref 34–104)
ALT SERPL W P-5'-P-CCNC: 17 U/L (ref 7–52)
ANION GAP SERPL CALCULATED.3IONS-SCNC: 6 MMOL/L (ref 4–13)
AST SERPL W P-5'-P-CCNC: 31 U/L (ref 13–39)
BILIRUB SERPL-MCNC: 0.33 MG/DL (ref 0.2–1)
BUN SERPL-MCNC: 9 MG/DL (ref 5–25)
CALCIUM SERPL-MCNC: 9.2 MG/DL (ref 8.4–10.2)
CHLORIDE SERPL-SCNC: 109 MMOL/L (ref 96–108)
CO2 SERPL-SCNC: 23 MMOL/L (ref 21–32)
CREAT SERPL-MCNC: 1.03 MG/DL (ref 0.6–1.3)
ERYTHROCYTE [DISTWIDTH] IN BLOOD BY AUTOMATED COUNT: 12.1 % (ref 11.6–15.1)
FOLATE SERPL-MCNC: 8.8 NG/ML
GFR SERPL CREATININE-BSD FRML MDRD: 68 ML/MIN/1.73SQ M
GLUCOSE SERPL-MCNC: 89 MG/DL (ref 65–140)
HCT VFR BLD AUTO: 41.8 % (ref 34.8–46.1)
HGB BLD-MCNC: 14.6 G/DL (ref 11.5–15.4)
MCH RBC QN AUTO: 32.7 PG (ref 26.8–34.3)
MCHC RBC AUTO-ENTMCNC: 34.9 G/DL (ref 31.4–37.4)
MCV RBC AUTO: 94 FL (ref 82–98)
PLATELET # BLD AUTO: 203 THOUSANDS/UL (ref 149–390)
PMV BLD AUTO: 10.7 FL (ref 8.9–12.7)
POTASSIUM SERPL-SCNC: 3.9 MMOL/L (ref 3.5–5.3)
PROT SERPL-MCNC: 6.7 G/DL (ref 6.4–8.4)
RBC # BLD AUTO: 4.46 MILLION/UL (ref 3.81–5.12)
SODIUM SERPL-SCNC: 138 MMOL/L (ref 135–147)
VIT B12 SERPL-MCNC: 157 PG/ML (ref 180–914)
WBC # BLD AUTO: 8.8 THOUSAND/UL (ref 4.31–10.16)

## 2025-06-23 PROCEDURE — 80053 COMPREHEN METABOLIC PANEL: CPT

## 2025-06-23 PROCEDURE — 82607 VITAMIN B-12: CPT

## 2025-06-23 PROCEDURE — 82306 VITAMIN D 25 HYDROXY: CPT

## 2025-06-23 PROCEDURE — 36415 COLL VENOUS BLD VENIPUNCTURE: CPT

## 2025-06-23 PROCEDURE — 85027 COMPLETE CBC AUTOMATED: CPT

## 2025-06-23 PROCEDURE — 82746 ASSAY OF FOLIC ACID SERUM: CPT

## 2025-07-01 ENCOUNTER — NURSE TRIAGE (OUTPATIENT)
Age: 40
End: 2025-07-01

## 2025-07-01 ENCOUNTER — OFFICE VISIT (OUTPATIENT)
Age: 40
End: 2025-07-01
Attending: NURSE PRACTITIONER
Payer: MEDICARE

## 2025-07-01 VITALS
DIASTOLIC BLOOD PRESSURE: 64 MMHG | TEMPERATURE: 97.9 F | BODY MASS INDEX: 38.02 KG/M2 | SYSTOLIC BLOOD PRESSURE: 122 MMHG | OXYGEN SATURATION: 98 % | HEIGHT: 63 IN | WEIGHT: 214.6 LBS | HEART RATE: 71 BPM

## 2025-07-01 DIAGNOSIS — K59.04 CHRONIC IDIOPATHIC CONSTIPATION: Primary | ICD-10-CM

## 2025-07-01 DIAGNOSIS — K59.00 CONSTIPATION, UNSPECIFIED CONSTIPATION TYPE: ICD-10-CM

## 2025-07-01 PROCEDURE — 99203 OFFICE O/P NEW LOW 30 MIN: CPT | Performed by: PHYSICIAN ASSISTANT

## 2025-07-01 RX ORDER — LINACLOTIDE 145 UG/1
145 CAPSULE, GELATIN COATED ORAL DAILY
Qty: 30 CAPSULE | Refills: 5 | Status: SHIPPED | OUTPATIENT
Start: 2025-07-01 | End: 2025-07-31

## 2025-07-01 RX ORDER — ALPRAZOLAM 0.5 MG
0.5 TABLET ORAL 2 TIMES DAILY PRN
COMMUNITY
Start: 2025-06-12

## 2025-07-01 NOTE — ASSESSMENT & PLAN NOTE
- failed Senna, Dulcolax, miralax, lactulose and dietary fiber  - trial of Linzess 145mcg daily  - encourage good daily fluids and fiber  - get xray to assess stool burden.  Orders:  •  XR abdomen 1 view kub; Future  •  linaCLOtide (Linzess) 145 MCG CAPS; Take 1 capsule (145 mcg total) by mouth in the morning

## 2025-07-01 NOTE — NURSING NOTE
Called patient to complete consult and go over instructions, patient is scheduled on 7/10/25   for diagnostic lumbar puncture. Verified allergies and no current anticoagulant medication present. Diet, medication instructions (taking own meds prior to test), also went over with patient that as of today with hold any ASA or ASA products till the date of the procedure and need for  was explained. Also went over instructions of location, time and date of procedure, of location and time ambulatory procedure unit. Also reviewed the procedure itself and answered any questions. Explained also post recovery instructions. Patient made aware that she may call if any other questions that may arise to the myself, gave them my contact information.   Information was also sent via e-mail to verified address and via epic my chart, see message below.  Upcoming Radiology appointment at [facility]  Good morning Ms. Day,     You are scheduled on 7/10/25 @ 10:30 am for diagnostic lumbar puncture.      You are to come @ 9 am to [facility] at [address] Present yourself to Admission services that is located on the ground floor to the left of the information desk in Building B. Please sign in using the Kiosk (if any issues with your registration, an admission personnel will assist you). Once admitted you will be instructed to go up to the 1st floor - Surgical services (it will be to the left at the main corridor on the 1st floor); they will get you changed for your procedure, update medical information, and draw blood work. A platelet and clotting time are needed the day of the procedure to assure your safety and healing post procedure.     You may eat a light non-greasy breakfast, drink fluids and take all your medications that are prescribed to you.     Please do not take any Aspirin and also be cautious of any over the counter medication please check if Aspirin is one of the ingredients  (Tylenol, Motrin and Aleve are fine to take). You will need to be off aspirin for at least 5 days to have the procedure done if you should take any aspirin.    For this study you will have local anesthetic (Lidocaine)  to the lower back, you will be awake during the study.     If you should need an anti-anxiety medication, please contact your primary doctor and request for such medication. Bring the medication with you so you can take an hour prior to the procedure.      If not on fluid restrictions, please increase your fluid intake 3 days prior to the procedure.    For the procedure you will be on your stomach, we will use an Xray to assist in accessing your cerebral spinal fluid once the area in your lower back is cleaned and you get a local anesthetic. Once the fluid has been collected, we will send them for testing per your ordering provider instructions. A Band-aid will be applied to the site and you will be assisted back onto your stretcher so you may go back to surgical services to be monitored for the allotted time usually 1 hour.     Upon discharge you will need a ride home so please bring a  for this study. If you should use Uber/Lyft you will need to have someone accompany you for your safety.      The night of or the days following you may experience soreness at your lower back and/or  headaches, these are normal and expected. Your discharge instructions will be to rest, hydrate with fluids (caffeine also helps with the headache if present) and take over the counter medication such as Tylenol, Motrin or Aleve. Please continue holding aspirin if over 81 mg dose.  If more than 3 -5 days have passed and you still experiencing severe headaches or pain please reach out to me at the number below. You also have the option at any time to visit your nearest [facility] emergency room and inform them that you had a procedure with [facility] radiology department.     If you have any  questions or concerns regarding the above information,  please feel free to send me a response via a call, email or [facility] my chart.      If we have not spoken yet and understand the above information and have no further questions or concerns can you please send me a response via a call, email or [facility] my chart that you have received and understood the information.     May you have a good day,   Zo Toth RN  [facility] Radiology RN  [address] [place] [id number]  [phone] (Office)  [phone] (Fax)  [email or web address]

## 2025-07-01 NOTE — TELEPHONE ENCOUNTER
"REASON FOR CONVERSATION: Medication Question-Topamax    SYMPTOMS: Short term memory loss    OTHER HEALTH INFORMATION: Topamax is helping with patient's migraines and patient would like to continue taking it. Patient is agreeable to lowering the dose until her body gets used to it.     PROTOCOL DISPOSITION: Discuss with Provider and Call Back Patient (overriding Callback by PCP Today)    CARE ADVICE PROVIDED: Memory issues are usually temporary and often go away when your body adjusts to Topamax or when you stop taking it.    PRACTICE FOLLOW-UP: MK, please see Triage and provide your recommendations.    Reason for Disposition   Caller has NON-URGENT medicine question about med that PCP or specialist prescribed and triager unable to answer question    Answer Assessment - Initial Assessment Questions  1. REASON FOR CALL: \"What is the main reason for your call?\" or \"How can I best help you?\"    Topamax 100 mg    2. SYMPTOMS : \"Do you have any symptoms?\" Short term memory loss. Patient's brother noticed a change in patient's memory about two weeks ago. Patient notice she starting forgetting things over the last 2-3 days.        3. NOTE: Patient finds Topamax helpful for her migraines and wants to stay on it but only if her memory improves. Patient has been on Topamax 100 mg since 6/19/25.    Protocols used: Information Only Call - No Triage-Adult-[place], Medication Question Call-Adult-[place]    "

## 2025-07-01 NOTE — PROGRESS NOTES
Name: Vivian You      : 1985      MRN: [id number]  Encounter Provider: Montana Flor PA-C  Encounter Date: 2025   Encounter department: [facility]  :  Assessment & Plan  Chronic idiopathic constipation  - failed Senna, Dulcolax, miralax, lactulose and dietary fiber  - trial of Linzess 145mcg daily  - encourage good daily fluids and fiber  - get xray to assess stool burden.  Orders:  •  XR abdomen 1 view kub; Future  •  linaCLOtide (Linzess) 145 MCG CAPS; Take 1 capsule (145 mcg total) by mouth in the morning    Constipation, unspecified constipation type    Orders:  •  Ambulatory Referral to Gastroenterology        History of Present Illness   Vivian You is a 39 y.o. female new to our office with past medical history of migraine, mild intermittent asthma, constipation, osteoarthritis, bipolar disorder type I, posttraumatic stress disorder, Abnormal uterine bleedingwho presents with regard to her concerns about her constipation.  Constipation has been worsening over the past month but is coming going on for a long time.  She may have been diagnosed with IBS when she was young and possibly given hyoscyamine.  She has failed many over-the-counter regimens including fiber, senna, Dulcolax, MiraLAX.  X-ray in 2025 showed normal bowel gas pattern.    Constipation  Years of issues - 1  Number of bowel movements weekly -sometimes daily but often times may not go for 2 or 3 days  Marston Stool Scale -1  Straining - Yes  Sense of incomplete bowel movements - Yes  History of hemorrhoids -yes  History of rectal bleeding -rare  Current Opioid use - No  Current medications -nothing currently  OTC meds in past -fiber, Senokot, Dulcolax, MiraLAX  Rx meds in past -none    Endoscopic History  EGD -none previously  Colonoscopy -none previously      HPI  History obtained from: patient  Review of Systems   Constitutional:  Negative for activity change, appetite  change, chills, diaphoresis, fatigue and unexpected weight change.   HENT:  Negative for mouth sores, sore throat and trouble swallowing.    Eyes:  Negative for pain, redness and visual disturbance.   Respiratory:  Negative for apnea, chest tightness and shortness of breath.    Cardiovascular:  Negative for chest pain and leg swelling.   Gastrointestinal:  Positive for constipation. Negative for abdominal distention, abdominal pain, anal bleeding, blood in stool, diarrhea, nausea and vomiting.   Genitourinary:  Negative for difficulty urinating, dysuria and hematuria.   Musculoskeletal:  Negative for arthralgias, back pain, gait problem, joint swelling and myalgias.   Skin:  Negative for color change, pallor and rash.   Allergic/Immunologic: Negative for environmental allergies and food allergies.   Neurological:  Negative for dizziness, weakness, light-headedness, numbness and headaches.   Psychiatric/Behavioral:  Negative for agitation and behavioral problems.     A complete review of systems is negative other than that noted above in the HPI.    Past Medical History   Past Medical History[1]  Past Surgical History[2]  Family History[3]   reports that she has been smoking cigarettes. She started smoking about 26 years ago. She has a 6.6 pack-year smoking history. She has been exposed to tobacco smoke. She has never used smokeless tobacco. She reports that she does not currently use alcohol. She reports current drug use. Drug: Marijuana.  Current Outpatient Medications   Medication Instructions   • acetaminophen (TYLENOL) 650 mg, Oral, Every 8 hours PRN   • albuterol (PROVENTIL HFA,VENTOLIN HFA) 90 mcg/act inhaler 1 puff, Inhalation, As needed, INHALE 1 PUFF EVERY 4 HOURS AS NEEDED FOR WHEEZING.   • ALPRAZolam (XANAX) 0.5 mg, 2 times daily PRN   • ARIPiprazole (ABILIFY) 5 mg tablet    • benzonatate (TESSALON PERLES) 100 mg, Oral, 3 times daily PRN   • clonazePAM (KlonoPIN) 1 mg tablet 1 tablet, Daily   • ibuprofen  "(MOTRIN) 600 mg, Oral, Every 6 hours PRN, During menses   • ketorolac (TORADOL) 10 mg, Oral, Every 6 hours PRN, Max 2-3 per week.   • Linzess 145 mcg, Oral, Daily   • magnesium oxide (MAG-OX) 400 mg tablet TAKE 1 TABLET (400 MG TOTAL) BY MOUTH IN THE MORNING   • Magnesium 400 mg, Oral, Daily   • methylPREDNISolone 4 MG tablet therapy pack Use as directed on package   • Mirena, 52 MG, 20 MCG/DAY IUD No dose, route, or frequency recorded.   • Multiple Vitamins-Minerals (WOMENS 50+ MULTI VITAMIN PO) Oral   • nicotine (NICODERM CQ) 14 mg/24hr TD 24 hr patch 1 patch, Transdermal, Every 24 hours   • ondansetron (ZOFRAN-ODT) 4 mg, Oral, Every 6 hours PRN   • OXcarbazepine (TRILEPTAL) 300 mg tablet 1 tablet, Oral, 2 times daily   • PARoxetine (PAXIL) 20 mg, Oral, Daily   • prochlorperazine (COMPAZINE) 10 mg, Oral, Every 6 hours PRN   • Riboflavin 400 mg, Oral, Daily   • rizatriptan (MAXALT-MLT) 10 mg disintegrating tablet 1 tab at migraine onset, repeat after 2 hours if needed; Max 2 per day and 3 per week.   • Sodium Polystyrene Sulfonate (KAYEXALATE) 30 g, Oral, Daily   • tiZANidine (ZANAFLEX) 2 mg, Oral, Every 8 hours PRN   • topiramate (TOPAMAX) 25 mg tablet 1 tab qhs x 5 days, then 2 tabs qhs x 5 days, then 3 tabs qhs x 5 days, then 4 tabs qhs   • traZODone (DESYREL) 150 mg tablet    Allergies[4]   Medications Ordered Prior to Encounter[5]   Social History[6]     Objective   /64 (BP Location: Left arm, Patient Position: Sitting, Cuff Size: Standard)   Pulse 71   Temp 97.9 °F (36.6 °C) (Tympanic)   Ht 5' 3\" (1.6 m)   Wt 97.3 kg (214 lb 9.6 oz)   SpO2 98%   BMI 38.01 kg/m²     Physical Exam  Vitals reviewed.   Constitutional:       General: She is not in acute distress.     Appearance: Normal appearance. She is not ill-appearing.   HENT:      Head: Normocephalic and atraumatic.     Eyes:      General: No scleral icterus.     Conjunctiva/sclera: Conjunctivae normal.       Cardiovascular:      Rate and Rhythm: " Normal rate and regular rhythm.   Pulmonary:      Effort: Pulmonary effort is normal. No respiratory distress.      Breath sounds: Normal breath sounds.   Abdominal:      General: Bowel sounds are normal. There is no distension.      Palpations: Abdomen is soft.      Tenderness: There is no abdominal tenderness. There is no guarding.     Skin:     General: Skin is warm and dry.     Neurological:      Mental Status: She is alert and oriented to person, place, and time.     Psychiatric:         Mood and Affect: Mood normal.         Behavior: Behavior normal.            Lab Results: I personally reviewed relevant lab results.               Montana Flor PA-C  [facility] - Gastroentrology           [1]  Past Medical History:  Diagnosis Date   • Allergic     seasonal   • Anxiety    • Asthma    • Bipolar 1 disorder (HCC)    • Depression    • Headache(784.0)    • IBS (irritable bowel syndrome)     diarrhea   • Insomnia    • Migraine    • Miscarriage    • PTSD (post-traumatic stress disorder)    • UTI (urinary tract infection)    [2]  Past Surgical History:  Procedure Laterality Date   • ANKLE FRACTURE SURGERY     • ANKLE HARDWARE REMOVAL Right    • ANKLE SURGERY Right    • CHOLECYSTECTOMY     • FOOT FRACTURE SURGERY     • FOOT SURGERY Left     with hardware   • MOUTH SURGERY  2011    metal placed from broken jaw   • ORIF MANDIBULAR FRACTURE Bilateral 06/03/2019    Procedure: MANDIBLE CARROLL REMOVAL AND HARWARE REMOVAL FROM LEFT MANDIBLE ALVEOLAR RIDGE;  Surgeon: Lucretia Sierra DMD;  Location:  MAIN OR;  Service: Maxillofacial   • OTHER SURGICAL HISTORY      unlisted craniofacial and maxillofacial procedure   • MO COLPOSCOPY CERVIX VAG LOOP ELTRD BX CERVIX N/A 03/17/2023    Procedure: BIOPSY LEEP CERVIX;  Surgeon: Yardlie Toussaint-Foster, DO;  Location: [facility];  Service: Gynecology   • MO CYSTOURETHROSCOPY N/A 10/24/2023    Procedure: CYSTO; INJECTION BULKING AGENT URETHRAL;  Surgeon:  Jenny Wagner MD;  Location: [facility] OR;  Service: Gynecology   • US GUIDED BREAST BIOPSY RIGHT COMPLETE Right 03/29/2023   [3]  Family History  Problem Relation Name Age of Onset   • Lung cancer Mother Denise Day    • Brain cancer Mother Denise Day    • Bone cancer Mother Denise Day    • Cancer Mother Denise Day    • COPD Mother Denise Day    • Depression Mother Denise Day    • Mental illness Mother Denise Day    • Coronary artery disease Mother Denise Day    • Lung cancer Father Donovan Day    • Bone cancer Father Donovan Day    • Cancer Father Donovan Day    • Lung cancer Paternal Grandfather     • Liver cancer Maternal Uncle     • Lung cancer Paternal Uncle     • No Known Problems Paternal Aunt     • No Known Problems Paternal Aunt     • Migraines Brother Denny    [4]  Allergies  Allergen Reactions   • Codeine GI Intolerance   • Fentanyl      Flushing   • Penicillins Headache, Other (See Comments) and Vomiting     Severe vomitting     • Naproxen Rash   • Wellbutrin [Bupropion] Rash   [5]  Current Outpatient Medications on File Prior to Visit   Medication Sig Dispense Refill   • acetaminophen (TYLENOL) 650 mg CR tablet Take 1 tablet (650 mg total) by mouth every 8 (eight) hours as needed for mild pain 30 tablet 0   • albuterol (PROVENTIL HFA,VENTOLIN HFA) 90 mcg/act inhaler Inhale 1 puff if needed for wheezing INHALE 1 PUFF EVERY 4 HOURS AS NEEDED FOR WHEEZING. 16 g 0   • ALPRAZolam (XANAX) 0.5 mg tablet Take 0.5 mg by mouth 2 (two) times a day as needed for anxiety (Patient taking differently: Take 0.5 mg by mouth 2 (two) times a day as needed for anxiety or sleep)     • ARIPiprazole (ABILIFY) 5 mg tablet      • benzonatate (TESSALON PERLES) 100 mg capsule Take 1 capsule (100 mg total) by mouth 3 (three) times a day as needed for cough 20 capsule 0   • ibuprofen (MOTRIN) 600 mg tablet Take 1 tablet (600 mg total) by mouth every 6 (six) hours as needed for mild pain for up to 5 days During menses 20 tablet 0   • ketorolac  (TORADOL) 10 mg tablet Take 1 tablet (10 mg total) by mouth every 6 (six) hours as needed (migraine) Max 2-3 per week. 10 tablet 5   • magnesium oxide (MAG-OX) 400 mg tablet TAKE 1 TABLET (400 MG TOTAL) BY MOUTH IN THE MORNING 30 tablet 5   • Mirena, 52 MG, 20 MCG/DAY IUD      • Multiple Vitamins-Minerals (WOMENS 50+ MULTI VITAMIN PO) Take by mouth     • nicotine (NICODERM CQ) 14 mg/24hr TD 24 hr patch Place 1 patch on the skin over 24 hours every 24 hours 28 patch 3   • ondansetron (ZOFRAN-ODT) 4 mg disintegrating tablet Take 1 tablet (4 mg total) by mouth every 6 (six) hours as needed for nausea or vomiting 12 tablet 0   • OXcarbazepine (TRILEPTAL) 300 mg tablet Take 1 tablet by mouth in the morning and 1 tablet before bedtime.     • PARoxetine (PAXIL) 20 mg tablet TAKE 1 TABLET BY MOUTH EVERY DAY 30 tablet 2   • prochlorperazine (COMPAZINE) 10 mg tablet Take 1 tablet (10 mg total) by mouth every 6 (six) hours as needed for nausea or vomiting 30 tablet 0   • Riboflavin 100 MG TABS Take 4 tablets (400 mg total) by mouth in the morning 120 tablet 6   • rizatriptan (MAXALT-MLT) 10 mg disintegrating tablet 1 tab at migraine onset, repeat after 2 hours if needed; Max 2 per day and 3 per week. 9 tablet 0   • tiZANidine (ZANAFLEX) 2 mg tablet Take 1 tablet (2 mg total) by mouth every 8 (eight) hours as needed for muscle spasms 40 tablet 0   • topiramate (TOPAMAX) 25 mg tablet 1 tab qhs x 5 days, then 2 tabs qhs x 5 days, then 3 tabs qhs x 5 days, then 4 tabs qhs 120 tablet 2   • traZODone (DESYREL) 150 mg tablet      • clonazePAM (KlonoPIN) 1 mg tablet Take 1 tablet by mouth in the morning (Patient not taking: Reported on 7/1/2025)     • Magnesium 400 MG TABS Take 1 tablet (400 mg total) by mouth in the morning (Patient not taking: Reported on 7/1/2025) 90 tablet 1   • methylPREDNISolone 4 MG tablet therapy pack Use as directed on package (Patient not taking: Reported on 7/1/2025) 21 each 0   • Sodium Polystyrene  Sulfonate (KAYEXALATE) 15 g/60 mL suspension Take 120 mL (30 g total) by mouth in the morning (Patient not taking: Reported on 7/1/2025) 360 mL 0     No current facility-administered medications on file prior to visit.   [6]  Social History  Tobacco Use   • Smoking status: Every Day     Current packs/day: 0.25     Average packs/day: 0.3 packs/day for 26.5 years (6.6 ttl pk-yrs)     Types: Cigarettes     Start date: 1999     Passive exposure: Current   • Smokeless tobacco: Never   • Tobacco comments:     2-3 cigarettes a day last smoked 10.23.23   Vaping Use   • Vaping status: Never Used   Substance and Sexual Activity   • Alcohol use: Not Currently   • Drug use: Yes     Types: Marijuana     Comment: Medical Card [place]   • Sexual activity: Yes     Partners: Male     Birth control/protection: I.U.D.

## 2025-07-02 ENCOUNTER — TELEPHONE (OUTPATIENT)
Age: 40
End: 2025-07-02

## 2025-07-02 NOTE — TELEPHONE ENCOUNTER
If she wants to decrease topamax that is fine, 75 mg qhs x 2 weeks, if still issues go down to 50 mg qhs.

## 2025-07-02 NOTE — TELEPHONE ENCOUNTER
PA for Linzess 145 mcg    SUBMITTED to [employer]    via    [x]CMM-KEY: [id number]           [x]PA sent as URGENT    All office notes, labs and other pertaining documents and studies sent. Clinical questions answered. Awaiting determination from insurance company.     Turnaround time for your insurance to make a decision on your Prior Authorization can take 7-21 business days.

## 2025-07-03 NOTE — TELEPHONE ENCOUNTER
Called and left a message on pt's answering machine for a call back    Didn't leave a detailed message bec I was unable to open any documents in media

## 2025-07-03 NOTE — TELEPHONE ENCOUNTER
PA for Linzess 72 mcg   APPROVED     Date(s) approved 7/2/2026    This medication is a possible high dollar medication. The patient has not been contacted regarding the decision as the office clinical team will handle advising the patient and if/any patient assistance that may have been started.  Thank you.      Approval letter scanned into Media Yes

## 2025-07-10 ENCOUNTER — HOSPITAL ENCOUNTER (OUTPATIENT)
Dept: RADIOLOGY | Facility: HOSPITAL | Age: 40
Discharge: HOME | End: 2025-07-10
Attending: PHYSICIAN ASSISTANT
Payer: MEDICARE

## 2025-07-10 VITALS
HEART RATE: 64 BPM | SYSTOLIC BLOOD PRESSURE: 107 MMHG | OXYGEN SATURATION: 98 % | TEMPERATURE: 96.4 F | RESPIRATION RATE: 17 BRPM | DIASTOLIC BLOOD PRESSURE: 66 MMHG

## 2025-07-10 DIAGNOSIS — G43.709 CHRONIC MIGRAINE WITHOUT AURA WITHOUT STATUS MIGRAINOSUS, NOT INTRACTABLE: ICD-10-CM

## 2025-07-10 LAB
APPEARANCE CSF: CLEAR
EXT PREGNANCY TEST URINE: NEGATIVE
EXT. CONTROL: NORMAL
GLUCOSE CSF-MCNC: 57 MG/DL (ref 40–70)
INR PPP: 0.89 (ref 0.85–1.19)
LYMPHOCYTES NFR CSF MANUAL: 100 %
PLATELET # BLD AUTO: 176 THOUSANDS/UL (ref 149–390)
PMV BLD AUTO: 11.7 FL (ref 8.9–12.7)
PROT CSF-MCNC: 36 MG/DL (ref 15–45)
PROTHROMBIN TIME: 12.3 SECONDS (ref 12.3–15)
RBC # CSF MANUAL: 1 UL (ref 0–10)
TOTAL CELLS COUNTED BLD: NO
TOTAL CELLS COUNTED SPEC: 3
TUBE # CSF: 4
WBC # CSF AUTO: 1 /UL (ref 0–5)

## 2025-07-10 PROCEDURE — 62328 DX LMBR SPI PNXR W/FLUOR/CT: CPT

## 2025-07-10 PROCEDURE — 89051 BODY FLUID CELL COUNT: CPT | Performed by: PHYSICIAN ASSISTANT

## 2025-07-10 PROCEDURE — 89050 BODY FLUID CELL COUNT: CPT | Performed by: PHYSICIAN ASSISTANT

## 2025-07-10 PROCEDURE — 84157 ASSAY OF PROTEIN OTHER: CPT | Performed by: PHYSICIAN ASSISTANT

## 2025-07-10 PROCEDURE — 82945 GLUCOSE OTHER FLUID: CPT | Performed by: PHYSICIAN ASSISTANT

## 2025-07-10 PROCEDURE — 81025 URINE PREGNANCY TEST: CPT | Performed by: PHYSICIAN ASSISTANT

## 2025-07-10 PROCEDURE — 85049 AUTOMATED PLATELET COUNT: CPT | Performed by: PHYSICIAN ASSISTANT

## 2025-07-10 PROCEDURE — 85610 PROTHROMBIN TIME: CPT | Performed by: PHYSICIAN ASSISTANT

## 2025-07-10 RX ORDER — LIDOCAINE HYDROCHLORIDE 10 MG/ML
5 INJECTION, SOLUTION EPIDURAL; INFILTRATION; INTRACAUDAL; PERINEURAL
Status: COMPLETED | OUTPATIENT
Start: 2025-07-10 | End: 2025-07-10

## 2025-07-10 RX ADMIN — LIDOCAINE HYDROCHLORIDE 4 ML: 10 INJECTION, SOLUTION EPIDURAL; INFILTRATION; INTRACAUDAL; PERINEURAL at 12:25

## 2025-07-10 NOTE — DISCHARGE INSTR - AVS FIRST PAGE
Lumbar Puncture     WHAT YOU NEED TO KNOW:   Lumbar puncture (LP) is a procedure in which a needle is inserted in your back and into your spinal canal. This is usually done to collect cerebrospinal fluid (CSF) to check for an infection, inflammation, bleeding, or other conditions that affect the brain. CSF is a clear, protective fluid that flows around the brain and inside the spinal canal. LP may also be done to remove CSF to reduce pressure in the brain.     DISCHARGE INSTRUCTIONS:     Follow up with your healthcare provider as directed: Write down your questions so you remember to ask them during your visits.     Post-lumbar puncture headache: You may develop a headache during the first few hours after your LP that may last for several days. The headache may be mild to severe and may get worse when you sit or stand. The following may help ease a post-lumbar puncture headache:  Drink plenty of liquids: You should drink more liquid than usual after your LP. Ask how much liquid is right for you. Caffeine may be used to treat a headache. Drinks, such as coffee, tea, or some sodas, have caffeine. Ask a Do not drink alcohol.    Lie down: If you have a headache after your lumbar puncture, it may be helpful to lie down and rest.  You may have a slight soreness over the LP area. This is normal.  Remove the band aid or dressing in 24 hours.    Contact Radiology immediately if any of the following occur:  You have a severe headache that does not get better after you lie down.  Persistent nausea or vomiting   You have a fever.   You have a stiff neck or have trouble thinking clearly.   Your legs, feet, or other parts below the waist feel numb, tingly, or weak.   You have bleeding or a discharge coming from the area where the needle was put into your back.   You have severe pain in your back or neck.    If Radiology is not available please go the nearest Emergency room for evaluation.     [facility] Radiology Contact  Information      [phone]  [facility]- [phone]  [facility]- [phone]  [facility]- [phone]   [facility]- [phone]  [facility]- [phone]  [facility]- [phone]  [facility]- [phone]

## 2025-07-10 NOTE — LETTER
[facility] FLUOROSCOPY  [address]  [place] [address]  Dept: [phone]    July 10, 2025     Patient: Vivian You   YOB: 1985   Date of Visit: 7/10/2025       To Whom it May Concern:    Vivian You is under my professional care. She was seen in the hospital from 7/10/2025 to 07/10/25. She may return to work on Monday, July 14th ,2025 without limitations.    If you have any questions or concerns, please don't hesitate to call.        Sincerely,          Eugenia Vergara PA-C

## 2025-07-14 ENCOUNTER — RESULTS FOLLOW-UP (OUTPATIENT)
Dept: NEUROLOGY | Facility: CLINIC | Age: 40
End: 2025-07-14

## 2025-07-14 DIAGNOSIS — G43.709 CHRONIC MIGRAINE WITHOUT AURA WITHOUT STATUS MIGRAINOSUS, NOT INTRACTABLE: Primary | ICD-10-CM

## 2025-07-14 DIAGNOSIS — E53.8 B12 DEFICIENCY: ICD-10-CM

## 2025-07-14 DIAGNOSIS — G43.109 MIGRAINE WITH AURA AND WITHOUT STATUS MIGRAINOSUS, NOT INTRACTABLE: ICD-10-CM

## 2025-07-15 RX ORDER — CYANOCOBALAMIN 1000 UG/ML
INJECTION, SOLUTION INTRAMUSCULAR; SUBCUTANEOUS
Qty: 4 ML | Refills: 2 | Status: SHIPPED | OUTPATIENT
Start: 2025-07-15

## 2025-07-15 RX ORDER — SYRINGE W-NEEDLE,DISPOSAB,3 ML 25GX5/8"
SYRINGE, EMPTY DISPOSABLE MISCELLANEOUS
Qty: 10 EACH | Refills: 0 | Status: SHIPPED | OUTPATIENT
Start: 2025-07-15

## 2025-07-23 ENCOUNTER — TELEMEDICINE (OUTPATIENT)
Dept: FAMILY MEDICINE CLINIC | Facility: CLINIC | Age: 40
End: 2025-07-23

## 2025-07-23 DIAGNOSIS — R05.1 ACUTE COUGH: ICD-10-CM

## 2025-07-23 PROCEDURE — 99213 OFFICE O/P EST LOW 20 MIN: CPT

## 2025-07-23 RX ORDER — GUAIFENESIN 200 MG/10ML
200 LIQUID ORAL 3 TIMES DAILY PRN
Qty: 120 ML | Refills: 0 | Status: SHIPPED | OUTPATIENT
Start: 2025-07-23

## 2025-07-23 RX ORDER — BENZONATATE 100 MG/1
100 CAPSULE ORAL 3 TIMES DAILY PRN
Qty: 20 CAPSULE | Refills: 0 | Status: SHIPPED | OUTPATIENT
Start: 2025-07-23

## 2025-07-23 NOTE — PROGRESS NOTES
Virtual Regular Visit  Name: Vivian You      : 1985      MRN: [id number]  Encounter Provider: GISSELLE Molina  Encounter Date: 2025   Encounter department: [facility]  :  Assessment & Plan  Acute cough  -Patient is requesting antibiotics.  that there is no indication for antibiotic at this time. Pt verbalizes understanding  -Manage symptoms by using humidifier to increase air moisture in your home.   -Continue robitussin and tessalon pearles PRN   -Encourage increase fluid intake and may use honey tea with lemon for cough  Orders:    benzonatate (TESSALON PERLES) 100 mg capsule; Take 1 capsule (100 mg total) by mouth 3 (three) times a day as needed for cough    guaiFENesin (ROBITUSSIN) 100 MG/5ML oral liquid; Take 10 mL (200 mg total) by mouth 3 (three) times a day as needed for cough        History of Present Illness     Vivian You is a 39 y.o. with  has a past medical history of Allergic, Anxiety, Asthma, Bipolar 1 disorder (HCC), Depression, Headache(784.0), IBS (irritable bowel syndrome), Insomnia, Migraine, Miscarriage, PTSD (post-traumatic stress disorder), and UTI (urinary tract infection).         Cough  This is a new problem. The current episode started yesterday. The problem has been unchanged. The cough is Non-productive. Associated symptoms include chills and a sore throat. Pertinent negatives include no chest pain, ear congestion, ear pain, fever, nasal congestion, postnasal drip, rash or shortness of breath. Nothing aggravates the symptoms.     Review of Systems   Constitutional:  Positive for chills. Negative for fatigue and fever.   HENT:  Positive for sore throat. Negative for ear pain and postnasal drip.    Eyes: Negative.  Negative for pain and visual disturbance.   Respiratory:  Positive for cough. Negative for shortness of breath.    Cardiovascular: Negative.  Negative for chest pain and palpitations.   Gastrointestinal: Negative.   Negative for abdominal pain and vomiting.   Genitourinary: Negative.  Negative for dysuria and hematuria.   Musculoskeletal: Negative.  Negative for arthralgias and back pain.   Skin: Negative.  Negative for color change and rash.   Neurological: Negative.  Negative for seizures and syncope.   Hematological: Negative.    Psychiatric/Behavioral: Negative.  Negative for behavioral problems.    All other systems reviewed and are negative.      Objective   There were no vitals taken for this visit.    Physical Exam  Constitutional:       Appearance: Normal appearance. She is normal weight.     Neurological:      General: No focal deficit present.      Mental Status: She is alert and oriented to person, place, and time. Mental status is at baseline.     Psychiatric:         Mood and Affect: Mood normal.         Behavior: Behavior normal.         Thought Content: Thought content normal.         Administrative Statements   Encounter provider GISSELLE Molina    The Patient is located at Home and in the following state in which I hold an active license [place].    The patient was identified by name and date of birth. Vivian PLATA Kenyatta was informed that this is a telemedicine visit and that the visit is being conducted through the Epic Embedded platform. She agrees to proceed..  My office door was closed. No one else was in the room.  She acknowledged consent and understanding of privacy and security of the video platform. The patient has agreed to participate and understands they can discontinue the visit at any time.    I have spent a total time of 15 minutes in caring for this patient on the day of the visit/encounter including Risks and benefits of tx options, Instructions for management, and Importance of tx compliance, not including the time spent for establishing the audio/video connection.

## 2025-07-25 DIAGNOSIS — J45.20 MILD INTERMITTENT ASTHMA WITHOUT COMPLICATION: ICD-10-CM

## 2025-07-25 RX ORDER — ALBUTEROL SULFATE 90 UG/1
1 INHALANT RESPIRATORY (INHALATION) AS NEEDED
Qty: 16 G | Refills: 0 | Status: SHIPPED | OUTPATIENT
Start: 2025-07-25

## 2025-07-29 ENCOUNTER — APPOINTMENT (OUTPATIENT)
Dept: LAB | Facility: HOSPITAL | Age: 40
End: 2025-07-29

## 2025-07-29 ENCOUNTER — OFFICE VISIT (OUTPATIENT)
Dept: LAB | Facility: HOSPITAL | Age: 40
End: 2025-07-29
Payer: MEDICARE

## 2025-07-29 ENCOUNTER — CONSULT (OUTPATIENT)
Dept: OBGYN CLINIC | Facility: CLINIC | Age: 40
End: 2025-07-29

## 2025-07-29 VITALS — SYSTOLIC BLOOD PRESSURE: 100 MMHG | DIASTOLIC BLOOD PRESSURE: 60 MMHG | WEIGHT: 212.8 LBS | BODY MASS INDEX: 37.7 KG/M2

## 2025-07-29 DIAGNOSIS — N93.9 ABNORMAL UTERINE BLEEDING: Primary | ICD-10-CM

## 2025-07-29 DIAGNOSIS — Z01.818 PRE-OP TESTING: ICD-10-CM

## 2025-07-29 LAB
ALBUMIN SERPL BCG-MCNC: 4.3 G/DL (ref 3.5–5)
ALP SERPL-CCNC: 63 U/L (ref 34–104)
ALT SERPL W P-5'-P-CCNC: 19 U/L (ref 7–52)
ANION GAP SERPL CALCULATED.3IONS-SCNC: 9 MMOL/L (ref 4–13)
AST SERPL W P-5'-P-CCNC: 33 U/L (ref 13–39)
BILIRUB SERPL-MCNC: 0.51 MG/DL (ref 0.2–1)
BUN SERPL-MCNC: 10 MG/DL (ref 5–25)
CALCIUM SERPL-MCNC: 9.1 MG/DL (ref 8.4–10.2)
CHLORIDE SERPL-SCNC: 104 MMOL/L (ref 96–108)
CO2 SERPL-SCNC: 25 MMOL/L (ref 21–32)
CREAT SERPL-MCNC: 0.75 MG/DL (ref 0.6–1.3)
ERYTHROCYTE [DISTWIDTH] IN BLOOD BY AUTOMATED COUNT: 12.6 % (ref 11.6–15.1)
GFR SERPL CREATININE-BSD FRML MDRD: 100 ML/MIN/1.73SQ M
GLUCOSE SERPL-MCNC: 80 MG/DL (ref 65–140)
HCT VFR BLD AUTO: 41.3 % (ref 34.8–46.1)
HGB BLD-MCNC: 14 G/DL (ref 11.5–15.4)
MCH RBC QN AUTO: 32.7 PG (ref 26.8–34.3)
MCHC RBC AUTO-ENTMCNC: 33.9 G/DL (ref 31.4–37.4)
MCV RBC AUTO: 97 FL (ref 82–98)
PLATELET # BLD AUTO: 181 THOUSANDS/UL (ref 149–390)
PMV BLD AUTO: 11.3 FL (ref 8.9–12.7)
POTASSIUM SERPL-SCNC: 3.9 MMOL/L (ref 3.5–5.3)
PROT SERPL-MCNC: 6.8 G/DL (ref 6.4–8.4)
RBC # BLD AUTO: 4.28 MILLION/UL (ref 3.81–5.12)
SODIUM SERPL-SCNC: 138 MMOL/L (ref 135–147)
WBC # BLD AUTO: 6.74 THOUSAND/UL (ref 4.31–10.16)

## 2025-07-29 PROCEDURE — 86900 BLOOD TYPING SEROLOGIC ABO: CPT | Performed by: OBSTETRICS & GYNECOLOGY

## 2025-07-29 PROCEDURE — 93005 ELECTROCARDIOGRAM TRACING: CPT

## 2025-07-29 PROCEDURE — 99214 OFFICE O/P EST MOD 30 MIN: CPT | Performed by: OBSTETRICS & GYNECOLOGY

## 2025-07-29 PROCEDURE — 86901 BLOOD TYPING SEROLOGIC RH(D): CPT | Performed by: OBSTETRICS & GYNECOLOGY

## 2025-07-29 PROCEDURE — 85027 COMPLETE CBC AUTOMATED: CPT

## 2025-07-29 PROCEDURE — 80053 COMPREHEN METABOLIC PANEL: CPT

## 2025-07-29 PROCEDURE — 86850 RBC ANTIBODY SCREEN: CPT | Performed by: OBSTETRICS & GYNECOLOGY

## 2025-07-30 ENCOUNTER — LAB REQUISITION (OUTPATIENT)
Dept: LAB | Facility: HOSPITAL | Age: 40
End: 2025-07-30
Payer: MEDICARE

## 2025-07-30 DIAGNOSIS — N93.9 ABNORMAL UTERINE AND VAGINAL BLEEDING, UNSPECIFIED: ICD-10-CM

## 2025-07-30 LAB
ABO GROUP BLD: NORMAL
ABO GROUP BLD: NORMAL
BLD GP AB SCN SERPL QL: NEGATIVE
BLD GP AB SCN SERPL QL: NEGATIVE
RH BLD: POSITIVE
RH BLD: POSITIVE
SPECIMEN EXPIRATION DATE: NORMAL
SPECIMEN EXPIRATION DATE: NORMAL

## 2025-07-31 ENCOUNTER — TELEMEDICINE (OUTPATIENT)
Dept: FAMILY MEDICINE CLINIC | Facility: CLINIC | Age: 40
End: 2025-07-31

## 2025-07-31 DIAGNOSIS — J32.9 BACTERIAL SINUSITIS: Primary | ICD-10-CM

## 2025-07-31 DIAGNOSIS — B96.89 BACTERIAL SINUSITIS: Primary | ICD-10-CM

## 2025-07-31 LAB
ATRIAL RATE: 60 BPM
P AXIS: 20 DEGREES
PR INTERVAL: 166 MS
QRS AXIS: 57 DEGREES
QRSD INTERVAL: 76 MS
QT INTERVAL: 418 MS
QTC INTERVAL: 418 MS
T WAVE AXIS: 52 DEGREES
VENTRICULAR RATE: 60 BPM

## 2025-07-31 PROCEDURE — 99213 OFFICE O/P EST LOW 20 MIN: CPT

## 2025-07-31 PROCEDURE — 93010 ELECTROCARDIOGRAM REPORT: CPT | Performed by: INTERNAL MEDICINE

## 2025-07-31 RX ORDER — DOXYCYCLINE HYCLATE 100 MG
100 TABLET ORAL 2 TIMES DAILY
Qty: 10 TABLET | Refills: 0 | Status: SHIPPED | OUTPATIENT
Start: 2025-07-31 | End: 2025-08-05

## 2025-07-31 RX ORDER — AZITHROMYCIN 250 MG/1
TABLET, FILM COATED ORAL
Qty: 6 TABLET | Refills: 0 | Status: CANCELLED | OUTPATIENT
Start: 2025-07-31 | End: 2025-08-05

## 2025-08-01 ENCOUNTER — TELEPHONE (OUTPATIENT)
Dept: OBGYN CLINIC | Facility: CLINIC | Age: 40
End: 2025-08-01

## 2025-08-07 ENCOUNTER — TELEPHONE (OUTPATIENT)
Dept: OBGYN CLINIC | Facility: CLINIC | Age: 40
End: 2025-08-07

## (undated) DEVICE — BONEE® NEEDLE FOR BLADDER INJECTION CH FR 05, 22G, 35 CM, BOX OF 1: Brand: PORGES COLOPLAST

## (undated) DEVICE — LLETZ LOOP 15 X 12MM MEGADYNE

## (undated) DEVICE — SPECIMEN CONTAINER STERILE PEEL PACK

## (undated) DEVICE — PREMIUM DRY TRAY LF: Brand: MEDLINE INDUSTRIES, INC.

## (undated) DEVICE — TUBING SUCTION 5MM X 12 FT

## (undated) DEVICE — NEEDLE 15 INSPINAL TIP 18 GA

## (undated) DEVICE — CYSTO TUBING SINGLE IRRIGATION

## (undated) DEVICE — GLOVE PI ULTRA TOUCH SZ 6

## (undated) DEVICE — SYRINGE 10ML LL CONTROL TOP

## (undated) DEVICE — BATTERY PACK-STERILE FOR BATTERY POWERED DRIVER

## (undated) DEVICE — BETHLEHEM UNIVERSAL MINOR VAG: Brand: CARDINAL HEALTH

## (undated) DEVICE — GLOVE PI ULTRA TOUCH SZ.8.0

## (undated) DEVICE — ELECTRODE BALL E-Z CLEAN 5 IN -0009

## (undated) DEVICE — SYRINGE 1ML TB 25G X 5/8 NON SAFETY

## (undated) DEVICE — NEEDLE 25G X 1 1/2

## (undated) DEVICE — SPONGE STICK WITH PVP-I: Brand: KENDALL

## (undated) DEVICE — PENCIL ELECTROSURG E-Z CLEAN -0035H

## (undated) DEVICE — SUT VICRYL 0 UR-6 27 IN J603H

## (undated) DEVICE — GLOVE SRG BIOGEL 7

## (undated) DEVICE — PVC URETHRAL CATHETER: Brand: DOVER

## (undated) DEVICE — STERILE MANDIBLE PACK: Brand: CARDINAL HEALTH

## (undated) DEVICE — MAYO STAND COVER: Brand: CONVERTORS

## (undated) DEVICE — SMOKE EVACUATION TUBING WITH 7/8 IN TO 1/4 IN REDUCER: Brand: BUFFALO FILTER

## (undated) DEVICE — INTENDED FOR TISSUE SEPARATION, AND OTHER PROCEDURES THAT REQUIRE A SHARP SURGICAL BLADE TO PUNCTURE OR CUT.: Brand: BARD-PARKER ® CARBON RIB-BACK BLADES

## (undated) DEVICE — STERILE 8 INCH PROCTO SWAB: Brand: CARDINAL HEALTH

## (undated) DEVICE — Device

## (undated) DEVICE — 2000CC GUARDIAN II: Brand: GUARDIAN

## (undated) DEVICE — SPINAL NEEDLE 22 G X 2 1/2

## (undated) DEVICE — GLOVE INDICATOR PI UNDERGLOVE SZ 6.5 BLUE

## (undated) DEVICE — SCD SEQUENTIAL COMPRESSION COMFORT SLEEVE MEDIUM KNEE LENGTH: Brand: KENDALL SCD